# Patient Record
Sex: FEMALE | Race: WHITE | NOT HISPANIC OR LATINO | URBAN - METROPOLITAN AREA
[De-identification: names, ages, dates, MRNs, and addresses within clinical notes are randomized per-mention and may not be internally consistent; named-entity substitution may affect disease eponyms.]

---

## 2024-07-28 ENCOUNTER — OFFICE VISIT (OUTPATIENT)
Dept: URGENT CARE | Facility: CLINIC | Age: 84
End: 2024-07-28
Payer: MEDICARE

## 2024-07-28 VITALS
SYSTOLIC BLOOD PRESSURE: 117 MMHG | BODY MASS INDEX: 30.73 KG/M2 | DIASTOLIC BLOOD PRESSURE: 22 MMHG | HEIGHT: 64 IN | HEART RATE: 53 BPM | OXYGEN SATURATION: 98 % | RESPIRATION RATE: 19 BRPM | WEIGHT: 180 LBS | TEMPERATURE: 98 F

## 2024-07-28 DIAGNOSIS — J32.9 SINUSITIS, UNSPECIFIED CHRONICITY, UNSPECIFIED LOCATION: ICD-10-CM

## 2024-07-28 DIAGNOSIS — Z20.822 EXPOSURE TO COVID-19 VIRUS: ICD-10-CM

## 2024-07-28 DIAGNOSIS — U07.1 COVID-19 VIRUS INFECTION: Primary | ICD-10-CM

## 2024-07-28 LAB
CTP QC/QA: YES
SARS-COV-2 AG RESP QL IA.RAPID: POSITIVE

## 2024-07-28 PROCEDURE — 99203 OFFICE O/P NEW LOW 30 MIN: CPT | Mod: S$GLB,,, | Performed by: NURSE PRACTITIONER

## 2024-07-28 PROCEDURE — 87811 SARS-COV-2 COVID19 W/OPTIC: CPT | Mod: QW,S$GLB,, | Performed by: NURSE PRACTITIONER

## 2024-07-28 RX ORDER — METOPROLOL TARTRATE 50 MG/1
1 TABLET ORAL 2 TIMES DAILY
COMMUNITY
Start: 2024-03-27

## 2024-07-28 RX ORDER — PRAVASTATIN SODIUM 10 MG/1
1 TABLET ORAL NIGHTLY
COMMUNITY
Start: 2024-03-27

## 2024-07-28 RX ORDER — HYDROCHLOROTHIAZIDE 12.5 MG/1
12.5 CAPSULE ORAL
COMMUNITY
Start: 2024-03-27 | End: 2025-03-27

## 2024-07-28 RX ORDER — AMIODARONE HYDROCHLORIDE 200 MG/1
1 TABLET ORAL DAILY
COMMUNITY

## 2024-07-28 RX ORDER — HYDROXYZINE HYDROCHLORIDE 25 MG/1
TABLET, FILM COATED ORAL
COMMUNITY

## 2024-07-28 RX ORDER — LEVOTHYROXINE SODIUM 112 UG/1
1 TABLET ORAL DAILY
COMMUNITY
Start: 2024-03-27

## 2024-07-28 RX ORDER — BENZONATATE 100 MG/1
100 CAPSULE ORAL 3 TIMES DAILY PRN
Qty: 21 CAPSULE | Refills: 0 | Status: SHIPPED | OUTPATIENT
Start: 2024-07-28 | End: 2024-08-04

## 2024-07-28 RX ORDER — PRIMIDONE 50 MG/1
TABLET ORAL
COMMUNITY
Start: 2024-04-10

## 2024-07-28 RX ORDER — AZELASTINE 1 MG/ML
1 SPRAY, METERED NASAL 2 TIMES DAILY PRN
Qty: 30 ML | Refills: 0 | Status: SHIPPED | OUTPATIENT
Start: 2024-07-28 | End: 2025-07-28

## 2024-07-28 RX ORDER — IBANDRONATE SODIUM 150 MG/1
TABLET, FILM COATED ORAL
COMMUNITY
Start: 2024-03-27

## 2024-07-28 RX ORDER — TRIAMCINOLONE ACETONIDE 1 MG/G
CREAM TOPICAL
COMMUNITY
Start: 2024-07-01

## 2024-07-28 RX ORDER — EMPAGLIFLOZIN 10 MG/1
1 TABLET, FILM COATED ORAL DAILY
COMMUNITY
Start: 2024-07-19

## 2024-07-28 RX ORDER — APIXABAN 5 MG/1
5 TABLET, FILM COATED ORAL 2 TIMES DAILY
COMMUNITY
Start: 2024-04-02

## 2024-07-28 RX ORDER — FUROSEMIDE 20 MG/1
TABLET ORAL
COMMUNITY

## 2024-07-28 RX ORDER — PREGABALIN 100 MG/1
1 CAPSULE ORAL 2 TIMES DAILY
COMMUNITY
Start: 2024-03-27

## 2024-07-28 RX ORDER — CETIRIZINE HYDROCHLORIDE 10 MG/1
10 TABLET ORAL
COMMUNITY
Start: 2023-11-28

## 2024-07-28 RX ORDER — GUAIFENESIN 100 MG/5ML
400 SOLUTION ORAL EVERY 4 HOURS PRN
Qty: 180 ML | Refills: 0 | Status: SHIPPED | OUTPATIENT
Start: 2024-07-28 | End: 2024-08-04

## 2024-07-28 RX ORDER — ERGOCALCIFEROL 1.25 MG/1
CAPSULE ORAL
COMMUNITY
Start: 2024-03-27

## 2024-07-28 NOTE — PATIENT INSTRUCTIONS
Quarantine at home for 5 days from onset of symptoms, or 24 hours fever free without the use of antipyretic medication.    Tylenol/motrin otc for fever/pain.  You may alternate these every 4 hours as needed for close control of fever or pain  Stop all over the counter cold, sinus, flu medications  Increase clear fluid intake.  Goal of 3/4-1 gal of fluid daily  May use Astelin nasal spray  as needed for nasal congestion and sinus pressure  Start Robitussin as needed for cough.  Take each dose of Robitussin with a large glass of water   May take Tessalon Perles for excessive cough.  May use a cool mist humidifier at bedside as needed for respiratory comfort  May use warm saltwater gargles 4 x daily and benzocaine anesthetic throat lozenges for sore throat  Follow up with PCP after quarantine for evaluation  Go to ER for shortness of breath, chest pain, or other emergent concern  Return to clinic for new, worse symptoms.

## 2024-07-28 NOTE — LETTER
July 28, 2024      [facility] [place]  [address]  [address]  [place] [address]  Phone: [phone]  Fax: [phone]       Patient: Niurka Patel   YOB: 1940  Date of Visit: 07/28/2024    To Whom It May Concern:    Nick Patel  was at [facility] on 07/28/2024. The patient may return to work/school on August 1, 2024  with no restrictions. If you have any questions or concerns, or if I can be of further assistance, please do not hesitate to contact me.    Sincerely,    Maik Aranda Jr., ADAMP-C

## 2024-07-28 NOTE — PROGRESS NOTES
"Subjective:      Patient ID: Niurka Patel is a 84 y.o. female.    Vitals:  height is 5' 4" (1.626 m) and weight is 81.6 kg (180 lb). Her oral temperature is 97.5 °F (36.4 °C). Her blood pressure is 117/22 (abnormal) and her pulse is 53 (abnormal). Her respiration is 19 and oxygen saturation is 98%.     Chief Complaint: Sinusitis    84-year-old female seen today for exposure to COVID.  She states her neighbor whom she spends lot of time with developed upper respiratory symptoms and tested positive for COVID 2 days ago.  She states she has not developed any symptoms other than sinusitis and just not feeling well.  There has been no fever, cough, or headache.    Sinusitis  This is a new problem. The current episode started in the past 7 days (3 days). The problem has been gradually worsening since onset. There has been no fever. Associated symptoms include congestion. Pertinent negatives include no chills, coughing, shortness of breath or sore throat. (Runny nose. Watery eyes )       Constitution: Negative for chills and fever.   HENT:  Positive for congestion. Negative for sore throat.    Cardiovascular:  Negative for chest pain, palpitations and sob on exertion.   Respiratory:  Negative for cough and shortness of breath.    Gastrointestinal:  Negative for nausea and vomiting.   Musculoskeletal:  Negative for muscle ache.   Skin:  Negative for rash.   Neurological:  Negative for dizziness, light-headedness, passing out, disorientation and altered mental status.   Psychiatric/Behavioral:  Negative for altered mental status, disorientation and confusion.       Objective:     Physical Exam   Constitutional: She is oriented to person, place, and time. She appears well-developed. She is cooperative.  Non-toxic appearance. She does not appear ill. No distress.   HENT:   Head: Normocephalic and atraumatic.   Ears:   Right Ear: Hearing and external ear normal.   Left Ear: Hearing, tympanic membrane and external ear normal. "   Nose: Rhinorrhea and congestion present. No mucosal edema or nasal deformity. No epistaxis. Right sinus exhibits no maxillary sinus tenderness and no frontal sinus tenderness. Left sinus exhibits no maxillary sinus tenderness and no frontal sinus tenderness.   Mouth/Throat: Uvula is midline, oropharynx is clear and moist and mucous membranes are normal. Mucous membranes are moist. No trismus in the jaw. Normal dentition. No uvula swelling. No oropharyngeal exudate, posterior oropharyngeal edema, posterior oropharyngeal erythema, tonsillar abscesses or cobblestoning. Tonsils are 1+ on the right. Tonsils are 1+ on the left. No tonsillar exudate.   Eyes: Conjunctivae and lids are normal. No scleral icterus.   Neck: Trachea normal and phonation normal. Neck supple. No edema present. No erythema present. No neck rigidity present.   Cardiovascular: Normal rate, regular rhythm, normal heart sounds and normal pulses.   Pulmonary/Chest: Effort normal and breath sounds normal. No stridor. No respiratory distress. She has no decreased breath sounds. She has no rhonchi.   Abdominal: Normal appearance.   Musculoskeletal: Normal range of motion.         General: No deformity. Normal range of motion.   Neurological: no focal deficit. She is alert and oriented to person, place, and time. She exhibits normal muscle tone. Coordination normal.   Skin: Skin is warm, dry, intact, not diaphoretic and not pale. Capillary refill takes 2 to 3 seconds.   Psychiatric: Her speech is normal and behavior is normal. Judgment and thought content normal.   Nursing note and vitals reviewed.      Assessment:     1. COVID-19 virus infection    2. Exposure to COVID-19 virus    3. Sinusitis, unspecified chronicity, unspecified location        Plan:       COVID-19 virus infection  -     azelastine (ASTELIN) 137 mcg (0.1 %) nasal spray; 1 spray (137 mcg total) by Nasal route 2 (two) times daily as needed for Rhinitis.  Dispense: 30 mL; Refill: 0  -      guaiFENesin 100 mg/5 ml (ROBITUSSIN) 100 mg/5 mL syrup; Take 20 mLs (400 mg total) by mouth every 4 (four) hours as needed for Cough or Congestion.  Dispense: 180 mL; Refill: 0  -     benzocaine-menthoL 6-10 mg lozenge; Take 1 lozenge by mouth every 2 (two) hours as needed (Sore Throat).  Dispense: 18 tablet; Refill: 0  -     COVID-19 Home Symptom Monitoring  - Duration (days): 14  -     benzonatate (TESSALON) 100 MG capsule; Take 1 capsule (100 mg total) by mouth 3 (three) times daily as needed for Cough.  Dispense: 21 capsule; Refill: 0    Exposure to COVID-19 virus  -     SARS Coronavirus 2 Antigen, POCT Manual Read    Sinusitis, unspecified chronicity, unspecified location  -     SARS Coronavirus 2 Antigen, POCT Manual Read      The test results and physical exam findings were discussed with the patient and all questions answered. We discussed symptom monitoring, conservative care methods, medication use, and follow up orders. she verbalized understanding and agreement with the plan of care.

## 2024-08-04 ENCOUNTER — OFFICE VISIT (OUTPATIENT)
Dept: URGENT CARE | Facility: CLINIC | Age: 84
End: 2024-08-04
Payer: MEDICARE

## 2024-08-04 VITALS
TEMPERATURE: 98 F | OXYGEN SATURATION: 95 % | DIASTOLIC BLOOD PRESSURE: 70 MMHG | BODY MASS INDEX: 30.73 KG/M2 | RESPIRATION RATE: 17 BRPM | SYSTOLIC BLOOD PRESSURE: 108 MMHG | HEIGHT: 64 IN | WEIGHT: 180 LBS | HEART RATE: 63 BPM

## 2024-08-04 DIAGNOSIS — U07.1 COVID-19: ICD-10-CM

## 2024-08-04 DIAGNOSIS — R09.89 CHEST CONGESTION: Primary | ICD-10-CM

## 2024-08-04 LAB
CTP QC/QA: YES
SARS-COV-2 AG RESP QL IA.RAPID: POSITIVE

## 2024-08-04 PROCEDURE — 87811 SARS-COV-2 COVID19 W/OPTIC: CPT | Mod: QW,,, | Performed by: STUDENT IN AN ORGANIZED HEALTH CARE EDUCATION/TRAINING PROGRAM

## 2024-08-04 PROCEDURE — 99214 OFFICE O/P EST MOD 30 MIN: CPT | Mod: ,,, | Performed by: STUDENT IN AN ORGANIZED HEALTH CARE EDUCATION/TRAINING PROGRAM

## 2024-08-04 RX ORDER — LEVOCETIRIZINE DIHYDROCHLORIDE 5 MG/1
5 TABLET, FILM COATED ORAL NIGHTLY
Qty: 30 TABLET | Refills: 0 | Status: SHIPPED | OUTPATIENT
Start: 2024-08-04 | End: 2025-08-04

## 2024-08-04 RX ORDER — GUAIFENESIN 600 MG/1
1200 TABLET, EXTENDED RELEASE ORAL 2 TIMES DAILY
Qty: 30 TABLET | Refills: 0 | Status: SHIPPED | OUTPATIENT
Start: 2024-08-04

## 2024-12-27 ENCOUNTER — HOSPITAL ENCOUNTER (INPATIENT)
Facility: HOSPITAL | Age: 84
LOS: 8 days | Discharge: HOME HEALTH | DRG: 308 | End: 2025-01-05
Attending: EMERGENCY MEDICINE
Payer: MEDICARE

## 2024-12-27 DIAGNOSIS — I48.92 ATRIAL FIBRILLATION AND FLUTTER: ICD-10-CM

## 2024-12-27 DIAGNOSIS — I48.91 AFIB: ICD-10-CM

## 2024-12-27 DIAGNOSIS — I31.39 PERICARDIAL EFFUSION: ICD-10-CM

## 2024-12-27 DIAGNOSIS — R06.02 SHORTNESS OF BREATH: ICD-10-CM

## 2024-12-27 DIAGNOSIS — Z01.89 ENCOUNTER FOR CARDIOVERSION PROCEDURE: ICD-10-CM

## 2024-12-27 DIAGNOSIS — E87.1 HYPONATREMIA: ICD-10-CM

## 2024-12-27 DIAGNOSIS — R07.9 CHEST PAIN: ICD-10-CM

## 2024-12-27 DIAGNOSIS — I48.91 ATRIAL FIBRILLATION WITH RVR: Primary | ICD-10-CM

## 2024-12-27 DIAGNOSIS — J45.909 ASTHMA, UNSPECIFIED ASTHMA SEVERITY, UNSPECIFIED WHETHER COMPLICATED, UNSPECIFIED WHETHER PERSISTENT: ICD-10-CM

## 2024-12-27 DIAGNOSIS — I50.9 ACUTE ON CHRONIC HEART FAILURE: ICD-10-CM

## 2024-12-27 DIAGNOSIS — I48.91 ATRIAL FIBRILLATION AND FLUTTER: ICD-10-CM

## 2024-12-27 PROBLEM — I10 HYPERTENSION, ESSENTIAL, BENIGN: Status: ACTIVE | Noted: 2024-07-16

## 2024-12-27 PROBLEM — I63.9 CEREBROVASCULAR ACCIDENT (CVA): Status: ACTIVE | Noted: 2022-06-28

## 2024-12-27 PROBLEM — E78.5 HYPERLIPIDEMIA: Status: ACTIVE | Noted: 2022-07-15

## 2024-12-27 PROBLEM — I26.99 PULMONARY EMBOLISM: Status: ACTIVE | Noted: 2024-04-05

## 2024-12-27 PROBLEM — E03.9 HYPOTHYROIDISM: Status: ACTIVE | Noted: 2024-04-05

## 2024-12-27 PROBLEM — E66.9 OBESITY (BMI 30-39.9): Status: ACTIVE | Noted: 2017-12-19

## 2024-12-27 LAB
ALBUMIN SERPL BCP-MCNC: 3.5 G/DL (ref 3.5–5.2)
ALP SERPL-CCNC: 121 U/L (ref 55–135)
ALT SERPL W/O P-5'-P-CCNC: 24 U/L (ref 10–44)
ANION GAP SERPL CALC-SCNC: 5 MMOL/L (ref 8–16)
ANION GAP SERPL CALC-SCNC: 5 MMOL/L (ref 8–16)
AST SERPL-CCNC: 35 U/L (ref 10–40)
BASOPHILS # BLD AUTO: 0.07 K/UL (ref 0–0.2)
BASOPHILS NFR BLD: 0.7 % (ref 0–1.9)
BILIRUB SERPL-MCNC: 0.6 MG/DL (ref 0.1–1)
BNP SERPL-MCNC: 156 PG/ML (ref 0–99)
BUN SERPL-MCNC: 12 MG/DL (ref 8–23)
BUN SERPL-MCNC: 13 MG/DL (ref 8–23)
CALCIUM SERPL-MCNC: 8.5 MG/DL (ref 8.7–10.5)
CALCIUM SERPL-MCNC: 9.1 MG/DL (ref 8.7–10.5)
CHLORIDE SERPL-SCNC: 93 MMOL/L (ref 95–110)
CHLORIDE SERPL-SCNC: 96 MMOL/L (ref 95–110)
CO2 SERPL-SCNC: 26 MMOL/L (ref 23–29)
CO2 SERPL-SCNC: 27 MMOL/L (ref 23–29)
CREAT SERPL-MCNC: 0.6 MG/DL (ref 0.5–1.4)
CREAT SERPL-MCNC: 0.6 MG/DL (ref 0.5–1.4)
DIFFERENTIAL METHOD BLD: ABNORMAL
EOSINOPHIL # BLD AUTO: 0.2 K/UL (ref 0–0.5)
EOSINOPHIL NFR BLD: 2.1 % (ref 0–8)
ERYTHROCYTE [DISTWIDTH] IN BLOOD BY AUTOMATED COUNT: 15.5 % (ref 11.5–14.5)
EST. GFR  (NO RACE VARIABLE): >60 ML/MIN/1.73 M^2
EST. GFR  (NO RACE VARIABLE): >60 ML/MIN/1.73 M^2
GLUCOSE SERPL-MCNC: 107 MG/DL (ref 70–110)
GLUCOSE SERPL-MCNC: 121 MG/DL (ref 70–110)
HCT VFR BLD AUTO: 38.4 % (ref 37–48.5)
HGB BLD-MCNC: 12.7 G/DL (ref 12–16)
IMM GRANULOCYTES # BLD AUTO: 0.04 K/UL (ref 0–0.04)
IMM GRANULOCYTES NFR BLD AUTO: 0.4 % (ref 0–0.5)
INFLUENZA A, MOLECULAR: NEGATIVE
INFLUENZA B, MOLECULAR: NEGATIVE
INR PPP: 1.1 (ref 0.8–1.2)
LYMPHOCYTES # BLD AUTO: 1 K/UL (ref 1–4.8)
LYMPHOCYTES NFR BLD: 10.5 % (ref 18–48)
MAGNESIUM SERPL-MCNC: 2.1 MG/DL (ref 1.6–2.6)
MCH RBC QN AUTO: 28.8 PG (ref 27–31)
MCHC RBC AUTO-ENTMCNC: 33.1 G/DL (ref 32–36)
MCV RBC AUTO: 87 FL (ref 82–98)
MONOCYTES # BLD AUTO: 0.9 K/UL (ref 0.3–1)
MONOCYTES NFR BLD: 9.5 % (ref 4–15)
NEUTROPHILS # BLD AUTO: 7.6 K/UL (ref 1.8–7.7)
NEUTROPHILS NFR BLD: 76.8 % (ref 38–73)
NRBC BLD-RTO: 0 /100 WBC
OSMOLALITY SERPL: 266 MOSM/KG (ref 275–295)
OSMOLALITY UR: 247 MOSM/KG (ref 50–1200)
PLATELET # BLD AUTO: 342 K/UL (ref 150–450)
PMV BLD AUTO: 9.4 FL (ref 9.2–12.9)
POTASSIUM SERPL-SCNC: 4.1 MMOL/L (ref 3.5–5.1)
POTASSIUM SERPL-SCNC: 4.3 MMOL/L (ref 3.5–5.1)
PROT SERPL-MCNC: 6.7 G/DL (ref 6–8.4)
PROTHROMBIN TIME: 11.9 SEC (ref 9–12.5)
RBC # BLD AUTO: 4.41 M/UL (ref 4–5.4)
SARS-COV-2 RDRP RESP QL NAA+PROBE: NEGATIVE
SODIUM SERPL-SCNC: 124 MMOL/L (ref 136–145)
SODIUM SERPL-SCNC: 128 MMOL/L (ref 136–145)
SODIUM UR-SCNC: <10 MMOL/L (ref 20–250)
SPECIMEN SOURCE: NORMAL
T4 FREE SERPL-MCNC: 1.03 NG/DL (ref 0.71–1.51)
TROPONIN I SERPL HS-MCNC: 13.6 PG/ML (ref 0–14.9)
TSH SERPL DL<=0.005 MIU/L-ACNC: 10.38 UIU/ML (ref 0.34–5.6)
WBC # BLD AUTO: 9.9 K/UL (ref 3.9–12.7)

## 2024-12-27 PROCEDURE — G0378 HOSPITAL OBSERVATION PER HR: HCPCS

## 2024-12-27 PROCEDURE — 94799 UNLISTED PULMONARY SVC/PX: CPT

## 2024-12-27 PROCEDURE — 93010 ELECTROCARDIOGRAM REPORT: CPT | Mod: ,,, | Performed by: INTERNAL MEDICINE

## 2024-12-27 PROCEDURE — 84300 ASSAY OF URINE SODIUM: CPT

## 2024-12-27 PROCEDURE — 25000242 PHARM REV CODE 250 ALT 637 W/ HCPCS

## 2024-12-27 PROCEDURE — 83880 ASSAY OF NATRIURETIC PEPTIDE: CPT | Performed by: EMERGENCY MEDICINE

## 2024-12-27 PROCEDURE — 96365 THER/PROPH/DIAG IV INF INIT: CPT

## 2024-12-27 PROCEDURE — 27000221 HC OXYGEN, UP TO 24 HOURS

## 2024-12-27 PROCEDURE — 83935 ASSAY OF URINE OSMOLALITY: CPT

## 2024-12-27 PROCEDURE — 99900031 HC PATIENT EDUCATION (STAT)

## 2024-12-27 PROCEDURE — 93005 ELECTROCARDIOGRAM TRACING: CPT | Performed by: INTERNAL MEDICINE

## 2024-12-27 PROCEDURE — 83735 ASSAY OF MAGNESIUM: CPT | Performed by: EMERGENCY MEDICINE

## 2024-12-27 PROCEDURE — 25000003 PHARM REV CODE 250

## 2024-12-27 PROCEDURE — 84443 ASSAY THYROID STIM HORMONE: CPT | Performed by: EMERGENCY MEDICINE

## 2024-12-27 PROCEDURE — 80048 BASIC METABOLIC PNL TOTAL CA: CPT

## 2024-12-27 PROCEDURE — 94761 N-INVAS EAR/PLS OXIMETRY MLT: CPT

## 2024-12-27 PROCEDURE — 99291 CRITICAL CARE FIRST HOUR: CPT

## 2024-12-27 PROCEDURE — 84484 ASSAY OF TROPONIN QUANT: CPT | Performed by: EMERGENCY MEDICINE

## 2024-12-27 PROCEDURE — 99900035 HC TECH TIME PER 15 MIN (STAT)

## 2024-12-27 PROCEDURE — 85610 PROTHROMBIN TIME: CPT | Performed by: EMERGENCY MEDICINE

## 2024-12-27 PROCEDURE — 25000003 PHARM REV CODE 250: Performed by: EMERGENCY MEDICINE

## 2024-12-27 PROCEDURE — 84439 ASSAY OF FREE THYROXINE: CPT | Performed by: EMERGENCY MEDICINE

## 2024-12-27 PROCEDURE — 87502 INFLUENZA DNA AMP PROBE: CPT

## 2024-12-27 PROCEDURE — 85025 COMPLETE CBC W/AUTO DIFF WBC: CPT | Performed by: EMERGENCY MEDICINE

## 2024-12-27 PROCEDURE — 94640 AIRWAY INHALATION TREATMENT: CPT

## 2024-12-27 PROCEDURE — 87635 SARS-COV-2 COVID-19 AMP PRB: CPT

## 2024-12-27 PROCEDURE — 80053 COMPREHEN METABOLIC PANEL: CPT | Performed by: EMERGENCY MEDICINE

## 2024-12-27 PROCEDURE — 83930 ASSAY OF BLOOD OSMOLALITY: CPT

## 2024-12-27 RX ORDER — SODIUM CHLORIDE 9 MG/ML
INJECTION, SOLUTION INTRAVENOUS CONTINUOUS
Status: DISCONTINUED | OUTPATIENT
Start: 2024-12-27 | End: 2024-12-27

## 2024-12-27 RX ORDER — LANOLIN ALCOHOL/MO/W.PET/CERES
800 CREAM (GRAM) TOPICAL
Status: DISCONTINUED | OUTPATIENT
Start: 2024-12-27 | End: 2025-01-05 | Stop reason: HOSPADM

## 2024-12-27 RX ORDER — LEVOTHYROXINE SODIUM 112 UG/1
112 TABLET ORAL
Status: DISCONTINUED | OUTPATIENT
Start: 2024-12-28 | End: 2025-01-05 | Stop reason: HOSPADM

## 2024-12-27 RX ORDER — AMOXICILLIN 250 MG
1 CAPSULE ORAL 2 TIMES DAILY PRN
Status: DISCONTINUED | OUTPATIENT
Start: 2024-12-27 | End: 2025-01-05 | Stop reason: HOSPADM

## 2024-12-27 RX ORDER — SODIUM,POTASSIUM PHOSPHATES 280-250MG
2 POWDER IN PACKET (EA) ORAL
Status: DISCONTINUED | OUTPATIENT
Start: 2024-12-27 | End: 2025-01-05 | Stop reason: HOSPADM

## 2024-12-27 RX ORDER — GLUCAGON 1 MG
1 KIT INJECTION
Status: DISCONTINUED | OUTPATIENT
Start: 2024-12-27 | End: 2025-01-05 | Stop reason: HOSPADM

## 2024-12-27 RX ORDER — IPRATROPIUM BROMIDE AND ALBUTEROL SULFATE 2.5; .5 MG/3ML; MG/3ML
3 SOLUTION RESPIRATORY (INHALATION) EVERY 6 HOURS PRN
Status: DISCONTINUED | OUTPATIENT
Start: 2024-12-27 | End: 2024-12-27

## 2024-12-27 RX ORDER — IPRATROPIUM BROMIDE AND ALBUTEROL SULFATE 2.5; .5 MG/3ML; MG/3ML
3 SOLUTION RESPIRATORY (INHALATION) EVERY 4 HOURS PRN
Status: DISCONTINUED | OUTPATIENT
Start: 2024-12-28 | End: 2025-01-05 | Stop reason: HOSPADM

## 2024-12-27 RX ORDER — NALOXONE HCL 0.4 MG/ML
0.02 VIAL (ML) INJECTION
Status: DISCONTINUED | OUTPATIENT
Start: 2024-12-27 | End: 2025-01-05 | Stop reason: HOSPADM

## 2024-12-27 RX ORDER — IBUPROFEN 200 MG
16 TABLET ORAL
Status: DISCONTINUED | OUTPATIENT
Start: 2024-12-27 | End: 2025-01-05 | Stop reason: HOSPADM

## 2024-12-27 RX ORDER — TALC
6 POWDER (GRAM) TOPICAL NIGHTLY PRN
Status: DISCONTINUED | OUTPATIENT
Start: 2024-12-27 | End: 2025-01-05 | Stop reason: HOSPADM

## 2024-12-27 RX ORDER — ALUMINUM HYDROXIDE, MAGNESIUM HYDROXIDE, AND SIMETHICONE 1200; 120; 1200 MG/30ML; MG/30ML; MG/30ML
30 SUSPENSION ORAL 4 TIMES DAILY PRN
Status: DISCONTINUED | OUTPATIENT
Start: 2024-12-27 | End: 2025-01-05 | Stop reason: HOSPADM

## 2024-12-27 RX ORDER — IBUPROFEN 200 MG
24 TABLET ORAL
Status: DISCONTINUED | OUTPATIENT
Start: 2024-12-27 | End: 2025-01-05 | Stop reason: HOSPADM

## 2024-12-27 RX ORDER — ASPIRIN 325 MG
325 TABLET ORAL
Status: COMPLETED | OUTPATIENT
Start: 2024-12-27 | End: 2024-12-27

## 2024-12-27 RX ORDER — PRAVASTATIN SODIUM 10 MG/1
10 TABLET ORAL NIGHTLY
Status: DISCONTINUED | OUTPATIENT
Start: 2024-12-27 | End: 2025-01-05 | Stop reason: HOSPADM

## 2024-12-27 RX ORDER — SODIUM CHLORIDE 0.9 % (FLUSH) 0.9 %
10 SYRINGE (ML) INJECTION EVERY 12 HOURS PRN
Status: DISCONTINUED | OUTPATIENT
Start: 2024-12-27 | End: 2025-01-05 | Stop reason: HOSPADM

## 2024-12-27 RX ORDER — ACETAMINOPHEN 325 MG/1
650 TABLET ORAL EVERY 4 HOURS PRN
Status: DISCONTINUED | OUTPATIENT
Start: 2024-12-27 | End: 2025-01-05 | Stop reason: HOSPADM

## 2024-12-27 RX ORDER — ONDANSETRON HYDROCHLORIDE 2 MG/ML
4 INJECTION, SOLUTION INTRAVENOUS EVERY 6 HOURS PRN
Status: DISCONTINUED | OUTPATIENT
Start: 2024-12-27 | End: 2025-01-05 | Stop reason: HOSPADM

## 2024-12-27 RX ADMIN — Medication 6 MG: at 10:12

## 2024-12-27 RX ADMIN — DEXTROSE MONOHYDRATE 12.5 MG/HR: 50 INJECTION, SOLUTION INTRAVENOUS at 11:12

## 2024-12-27 RX ADMIN — IPRATROPIUM BROMIDE AND ALBUTEROL SULFATE 3 ML: .5; 3 SOLUTION RESPIRATORY (INHALATION) at 08:12

## 2024-12-27 RX ADMIN — PRAVASTATIN SODIUM 10 MG: 10 TABLET ORAL at 10:12

## 2024-12-27 RX ADMIN — APIXABAN 5 MG: 5 TABLET, FILM COATED ORAL at 10:12

## 2024-12-27 RX ADMIN — ASPIRIN 325 MG ORAL TABLET 325 MG: 325 PILL ORAL at 04:12

## 2024-12-27 RX ADMIN — DEXTROSE MONOHYDRATE 5 MG/HR: 50 INJECTION, SOLUTION INTRAVENOUS at 04:12

## 2024-12-27 RX ADMIN — PREGABALIN 100 MG: 75 CAPSULE ORAL at 10:12

## 2024-12-27 NOTE — ED PROVIDER NOTES
Encounter Date: 12/27/2024       History     Chief Complaint   Patient presents with    Palpitations     Pt states she was in a fib 1 month ago and thinks she is back in a-fib again today     84-year-old female presented emergency department with generalized weakness and fatigue and shortness of breath over the past 2 days.  Patient has history of atrial fibrillation.  Home health nurse checked her heart rate and it was fast and she was in atrial fibrillation.  Patient had a cardioversion about a month ago.  Patient on blood thinners.  Patient states she takes Eliquis.  Denies any dysuria or hematuria.  Denies any focal weakness or numbness      Review of patient's allergies indicates:  No Known Allergies  No past medical history on file.  No past surgical history on file.  No family history on file.     Review of Systems   Constitutional: Negative.    HENT: Negative.     Eyes: Negative.    Respiratory:  Positive for shortness of breath.    Cardiovascular:  Positive for palpitations. Negative for chest pain.   Gastrointestinal: Negative.    Endocrine: Negative.    Genitourinary: Negative.    Musculoskeletal: Negative.    Skin: Negative.    Allergic/Immunologic: Negative.    Neurological: Negative.    Hematological: Negative.    Psychiatric/Behavioral: Negative.     All other systems reviewed and are negative.      Physical Exam     Initial Vitals [12/27/24 1528]   BP Pulse Resp Temp SpO2   103/74 (!) 122 18 98.1 °F (36.7 °C) 97 %      MAP       --         Physical Exam    Nursing note and vitals reviewed.  Constitutional: She appears well-developed and well-nourished.   HENT:   Head: Normocephalic and atraumatic.   Nose: Nose normal. Mouth/Throat: Oropharynx is clear and moist.   Eyes: Conjunctivae and EOM are normal. Pupils are equal, round, and reactive to light.   Neck: Neck supple. No thyromegaly present. No tracheal deviation present. No JVD present.   Normal range of motion.  Cardiovascular:  Normal heart  sounds and intact distal pulses.           No murmur heard.  Tachycardia with irregularly irregular rhythm   Pulmonary/Chest: Breath sounds normal. No stridor. No respiratory distress. She has no wheezes. She has no rales.   Abdominal: Abdomen is soft. Bowel sounds are normal. She exhibits no distension. There is no abdominal tenderness.   Musculoskeletal:         General: No tenderness or edema. Normal range of motion.      Cervical back: Normal range of motion and neck supple.     Neurological: She is alert and oriented to person, place, and time. She has normal strength. GCS score is 15. GCS eye subscore is 4. GCS verbal subscore is 5. GCS motor subscore is 6.   Skin: Skin is warm. Capillary refill takes less than 2 seconds.   Psychiatric: She has a normal mood and affect. Thought content normal.         ED Course   Critical Care    Date/Time: 12/27/2024 3:43 PM    Performed by: Tony Mooney MD  Authorized by: Tony Mooney MD  Direct patient critical care time: 20 minutes  Ordering / reviewing critical care time: 5 minutes  Documentation critical care time: 5 minutes  Consulting other physicians critical care time: 5 minutes  Total critical care time (exclusive of procedural time) : 35 minutes        Labs Reviewed   CBC W/ AUTO DIFFERENTIAL - Abnormal       Result Value    WBC 9.90      RBC 4.41      Hemoglobin 12.7      Hematocrit 38.4      MCV 87      MCH 28.8      MCHC 33.1      RDW 15.5 (*)     Platelets 342      MPV 9.4      Immature Granulocytes 0.4      Gran # (ANC) 7.6      Immature Grans (Abs) 0.04      Lymph # 1.0      Mono # 0.9      Eos # 0.2      Baso # 0.07      nRBC 0      Gran % 76.8 (*)     Lymph % 10.5 (*)     Mono % 9.5      Eosinophil % 2.1      Basophil % 0.7      Differential Method Automated     COMPREHENSIVE METABOLIC PANEL - Abnormal    Sodium 124 (*)     Potassium 4.3      Chloride 93 (*)     CO2 26      Glucose 107      BUN 13      Creatinine 0.6      Calcium 9.1      Total Protein 6.7       Albumin 3.5      Total Bilirubin 0.6      Alkaline Phosphatase 121      AST 35      ALT 24      eGFR >60.0      Anion Gap 5 (*)    B-TYPE NATRIURETIC PEPTIDE - Abnormal     (*)    TROPONIN I HIGH SENSITIVITY    Troponin I High Sensitivity 13.6     PROTIME-INR    Prothrombin Time 11.9      INR 1.1     MAGNESIUM    Magnesium 2.1       EKG Readings: (Independently Interpreted)   Initial Reading: No STEMI. Rhythm: Atrial Fibrillation. Ectopy: No Ectopy. Conduction: Normal.   Atrial fibrillation with rapid ventricular response     ECG Results              EKG 12-lead (In process)        Collection Time Result Time QRS Duration [facility] QTC Calculation    12/27/24 15:29:05 12/27/24 16:08:29 104 393                     In process by Interface, Lab In [facility] (12/27/24 16:08:38)                   Narrative:    Test Reason : R06.02,    Vent. Rate : 138 BPM     Atrial Rate : 144 BPM     P-R Int :    ms          QRS Dur : 104 ms      QT Int : 260 ms       P-R-T Axes :    154  41 degrees    QTcB Int : 393 ms    Atrial fibrillation with rapid ventricular response  Right axis deviation  Incomplete right bundle branch block  Possible Right ventricular hypertrophy  Septal infarct ,age undetermined  Abnormal ECG  No previous ECGs available    Referred By: AAAREFERRAL SELF           Confirmed By:                                   Imaging Results              X-Ray Chest AP Portable (Final result)  Result time 12/27/24 16:22:14      Final result by Yaquelin Jacobs IV, MD (12/27/24 16:22:14)                   Impression:      Suboptimal positioning.    Cardiomegaly.    Scattered linear and ground-glass opacities in the mid lower lung zones.    Blunting of the costophrenic angles compatible small components of pleural fluid.    Questionable nodular opacity in the right upper lobe near the apex.  Follow-up plain radiographs are recommended to determine at this represents a persistent finding.  If such, CT would be necessary for  further investigation.      Electronically signed by: Yaquelin Jacobs  Date:    12/27/2024  Time:    16:22               Narrative:    EXAMINATION:  XR CHEST AP PORTABLE    CLINICAL HISTORY:  CHF;    COMPARISON:  None.    FINDINGS:  The patient is significantly rotated.    The heart is enlarged.  The central pulmonary vasculature does not appear acutely engorged.  There is tortuosity of the aorta and branches.    Linear parenchymal opacities are observed within the left mid and lower lung zone and in the right lung base.  Additionally, there are mild ground-glass opacities in the right mid lung zone and bilateral lower lung zones.  Blunting of the costophrenic angles bilaterally suggest small components of pleural fluid.    There is a nodular appearing opacity within the left pulmonary apex.  Follow-up radiographs are recommended to determine if this is a persistent abnormality.                                       Medications   diltiaZEM 100 mg in dextrose 5% 100 mL IVPB (ready to mix) (titrating) (5 mg/hr Intravenous New Bag 12/27/24 1657)   0.9% NaCl infusion (has no administration in time range)   aspirin tablet 325 mg (325 mg Oral Given 12/27/24 1657)     Medical Decision Making  84-year-old female presented emergency department with palpitations.  Patient has history of atrial fibrillation.  Patient now in atrial fibrillation with rapid ventricular response.  Responded to Cardizem.  Patient otherwise hemodynamically stable.  Patient has slight shortness of breath with exertion.  Screening labs and workup reviewed.  Patient on blood thinners.  Patient noted to be slightly hyponatremic.  Will closely monitor in the hospital and heart rate control with Cardizem.  Hospital Medicine consulted for evaluation for further management and treatment    Amount and/or Complexity of Data Reviewed  Labs: ordered. Decision-making details documented in ED Course.  Radiology: ordered. Decision-making details documented in ED  Course.  ECG/medicine tests: ordered and independent interpretation performed. Decision-making details documented in ED Course.    Risk  OTC drugs.  Prescription drug management.  Decision regarding hospitalization.                                      Clinical Impression:  Final diagnoses:  [R06.02] Shortness of breath  [I48.91] Atrial fibrillation with RVR (Primary)  [E87.1] Hyponatremia          ED Disposition Condition    Admit Critical                Tony Mooney MD  12/27/24 1700       Tony Mooney MD  12/27/24 1700

## 2024-12-28 ENCOUNTER — CLINICAL SUPPORT (OUTPATIENT)
Dept: CARDIOLOGY | Facility: HOSPITAL | Age: 84
End: 2024-12-28
Attending: EMERGENCY MEDICINE
Payer: MEDICARE

## 2024-12-28 LAB
ANION GAP SERPL CALC-SCNC: 10 MMOL/L (ref 8–16)
ANION GAP SERPL CALC-SCNC: 6 MMOL/L (ref 8–16)
AORTIC ROOT ANNULUS: 3.3 CM
AORTIC VALVE CUSP SEPERATION: 2.3 CM
APICAL FOUR CHAMBER EJECTION FRACTION: 47 %
APICAL TWO CHAMBER EJECTION FRACTION: 25 %
APTT PPP: 41.6 SEC (ref 21–32)
ASCENDING AORTA: 3.4 CM
AV INDEX (PROSTH): 0.94
AV MEAN GRADIENT: 3 MMHG
AV PEAK GRADIENT: 4.8 MMHG
AV VALVE AREA BY VELOCITY RATIO: 2.8 CM²
AV VALVE AREA: 3.3 CM²
AV VELOCITY RATIO: 0.82
BACTERIA #/AREA URNS HPF: ABNORMAL /HPF
BASOPHILS # BLD AUTO: 0.04 K/UL (ref 0–0.2)
BASOPHILS # BLD AUTO: 0.06 K/UL (ref 0–0.2)
BASOPHILS NFR BLD: 0.5 % (ref 0–1.9)
BASOPHILS NFR BLD: 0.7 % (ref 0–1.9)
BILIRUB UR QL STRIP: NEGATIVE
BSA FOR ECHO PROCEDURE: 1.82 M2
BUN SERPL-MCNC: 10 MG/DL (ref 8–23)
BUN SERPL-MCNC: 12 MG/DL (ref 8–23)
CALCIUM SERPL-MCNC: 8.7 MG/DL (ref 8.7–10.5)
CALCIUM SERPL-MCNC: 8.7 MG/DL (ref 8.7–10.5)
CHLORIDE SERPL-SCNC: 97 MMOL/L (ref 95–110)
CHLORIDE SERPL-SCNC: 98 MMOL/L (ref 95–110)
CHOLEST SERPL-MCNC: 100 MG/DL (ref 120–199)
CHOLEST/HDLC SERPL: 2 {RATIO} (ref 2–5)
CLARITY UR: CLEAR
CO2 SERPL-SCNC: 22 MMOL/L (ref 23–29)
CO2 SERPL-SCNC: 25 MMOL/L (ref 23–29)
COLOR UR: COLORLESS
CORTIS SERPL-MCNC: 21.4 UG/DL (ref 4.3–22.4)
CREAT SERPL-MCNC: 0.5 MG/DL (ref 0.5–1.4)
CREAT SERPL-MCNC: 0.5 MG/DL (ref 0.5–1.4)
CV ECHO LV RWT: 0.53 CM
DIFFERENTIAL METHOD BLD: ABNORMAL
DIFFERENTIAL METHOD BLD: ABNORMAL
DOP CALC AO PEAK VEL: 1.1 M/S
DOP CALC AO VTI: 16.6 CM
DOP CALC LVOT AREA: 3.5 CM2
DOP CALC LVOT DIAMETER: 2.1 CM
DOP CALC LVOT PEAK VEL: 0.9 M/S
DOP CALC LVOT STROKE VOLUME: 54 CM3
DOP CALC MV VTI: 15.1 CM
DOP CALCLVOT PEAK VEL VTI: 15.6 CM
E/E' RATIO: 9.68 M/S
ECHO LV POSTERIOR WALL: 1.2 CM (ref 0.6–1.1)
EOSINOPHIL # BLD AUTO: 0.3 K/UL (ref 0–0.5)
EOSINOPHIL # BLD AUTO: 0.3 K/UL (ref 0–0.5)
EOSINOPHIL NFR BLD: 2.9 % (ref 0–8)
EOSINOPHIL NFR BLD: 3.5 % (ref 0–8)
ERYTHROCYTE [DISTWIDTH] IN BLOOD BY AUTOMATED COUNT: 15.5 % (ref 11.5–14.5)
ERYTHROCYTE [DISTWIDTH] IN BLOOD BY AUTOMATED COUNT: 15.6 % (ref 11.5–14.5)
EST. GFR  (NO RACE VARIABLE): >60 ML/MIN/1.73 M^2
EST. GFR  (NO RACE VARIABLE): >60 ML/MIN/1.73 M^2
ESTIMATED AVG GLUCOSE: 128 MG/DL (ref 68–131)
FRACTIONAL SHORTENING: 15.6 % (ref 28–44)
GLUCOSE SERPL-MCNC: 107 MG/DL (ref 70–110)
GLUCOSE SERPL-MCNC: 94 MG/DL (ref 70–110)
GLUCOSE UR QL STRIP: ABNORMAL
HBA1C MFR BLD: 6.1 % (ref 4.5–6.2)
HCT VFR BLD AUTO: 36.4 % (ref 37–48.5)
HCT VFR BLD AUTO: 38.7 % (ref 37–48.5)
HDLC SERPL-MCNC: 51 MG/DL (ref 40–75)
HDLC SERPL: 51 % (ref 20–50)
HGB BLD-MCNC: 12.3 G/DL (ref 12–16)
HGB BLD-MCNC: 12.6 G/DL (ref 12–16)
HGB UR QL STRIP: NEGATIVE
IMM GRANULOCYTES # BLD AUTO: 0.03 K/UL (ref 0–0.04)
IMM GRANULOCYTES # BLD AUTO: 0.04 K/UL (ref 0–0.04)
IMM GRANULOCYTES NFR BLD AUTO: 0.3 % (ref 0–0.5)
IMM GRANULOCYTES NFR BLD AUTO: 0.5 % (ref 0–0.5)
INR PPP: 1.1 (ref 0.8–1.2)
INTERVENTRICULAR SEPTUM: 0.9 CM (ref 0.6–1.1)
IVC DIAMETER: 2.4 CM
KETONES UR QL STRIP: NEGATIVE
LDLC SERPL CALC-MCNC: 38 MG/DL (ref 63–159)
LEFT ATRIUM AREA SYSTOLIC (APICAL 2 CHAMBER): 26.3 CM2
LEFT ATRIUM AREA SYSTOLIC (APICAL 4 CHAMBER): 27.1 CM2
LEFT ATRIUM VOLUME INDEX MOD: 46.1 ML/M2
LEFT ATRIUM VOLUME MOD: 82.5 ML
LEFT INTERNAL DIMENSION IN SYSTOLE: 3.8 CM (ref 2.1–4)
LEFT VENTRICLE DIASTOLIC VOLUME INDEX: 51.62 ML/M2
LEFT VENTRICLE DIASTOLIC VOLUME: 92.4 ML
LEFT VENTRICLE END DIASTOLIC VOLUME APICAL 2 CHAMBER: 69.9 ML
LEFT VENTRICLE END DIASTOLIC VOLUME APICAL 4 CHAMBER: 62.9 ML
LEFT VENTRICLE END SYSTOLIC VOLUME APICAL 2 CHAMBER: 81.9 ML
LEFT VENTRICLE END SYSTOLIC VOLUME APICAL 4 CHAMBER: 83 ML
LEFT VENTRICLE MASS INDEX: 91.6 G/M2
LEFT VENTRICLE SYSTOLIC VOLUME INDEX: 34.6 ML/M2
LEFT VENTRICLE SYSTOLIC VOLUME: 62 ML
LEFT VENTRICULAR INTERNAL DIMENSION IN DIASTOLE: 4.5 CM (ref 3.5–6)
LEFT VENTRICULAR MASS: 164 G
LEUKOCYTE ESTERASE UR QL STRIP: ABNORMAL
LV LATERAL E/E' RATIO: 9.2 M/S
LV SEPTAL E/E' RATIO: 10.22 M/S
LVED V (TEICH): 92.4 ML
LVES V (TEICH): 62 ML
LVOT MG: 2 MMHG
LVOT MV: 0.58 CM/S
LYMPHOCYTES # BLD AUTO: 0.9 K/UL (ref 1–4.8)
LYMPHOCYTES # BLD AUTO: 1.1 K/UL (ref 1–4.8)
LYMPHOCYTES NFR BLD: 10.8 % (ref 18–48)
LYMPHOCYTES NFR BLD: 12.8 % (ref 18–48)
MAGNESIUM SERPL-MCNC: 2 MG/DL (ref 1.6–2.6)
MAGNESIUM SERPL-MCNC: 2.1 MG/DL (ref 1.6–2.6)
MCH RBC QN AUTO: 28.5 PG (ref 27–31)
MCH RBC QN AUTO: 29.4 PG (ref 27–31)
MCHC RBC AUTO-ENTMCNC: 32.6 G/DL (ref 32–36)
MCHC RBC AUTO-ENTMCNC: 33.8 G/DL (ref 32–36)
MCV RBC AUTO: 87 FL (ref 82–98)
MCV RBC AUTO: 88 FL (ref 82–98)
MICROSCOPIC COMMENT: ABNORMAL
MONOCYTES # BLD AUTO: 0.9 K/UL (ref 0.3–1)
MONOCYTES # BLD AUTO: 0.9 K/UL (ref 0.3–1)
MONOCYTES NFR BLD: 10 % (ref 4–15)
MONOCYTES NFR BLD: 10.9 % (ref 4–15)
MR PISA EROA: 0.14 CM2
MV MEAN GRADIENT: 1 MMHG
MV PEAK E VEL: 0.92 M/S
MV PEAK GRADIENT: 3 MMHG
MV STENOSIS PRESSURE HALF TIME: 57 MS
MV VALVE AREA BY CONTINUITY EQUATION: 3.58 CM2
MV VALVE AREA P 1/2 METHOD: 3.86 CM2
NEUTROPHILS # BLD AUTO: 6.4 K/UL (ref 1.8–7.7)
NEUTROPHILS # BLD AUTO: 6.5 K/UL (ref 1.8–7.7)
NEUTROPHILS NFR BLD: 73.3 % (ref 38–73)
NEUTROPHILS NFR BLD: 73.8 % (ref 38–73)
NITRITE UR QL STRIP: NEGATIVE
NONHDLC SERPL-MCNC: 49 MG/DL
NRBC BLD-RTO: 0 /100 WBC
NRBC BLD-RTO: 0 /100 WBC
OHS LV EJECTION FRACTION SIMPSONS BIPLANE MOD: 38 %
OSMOLALITY UR: 223 MOSM/KG (ref 50–1200)
PH UR STRIP: 6 [PH] (ref 5–8)
PHOSPHATE SERPL-MCNC: 3.1 MG/DL (ref 2.7–4.5)
PISA MRMAX VEL: 4.81 M/S
PISA RADIUS: 0.6 CM
PISA TR MAX VEL: 2.25 M/S
PISA VN NYQUIST MS: 0.3 M/S
PISA VN NYQUIST: 0.3 M/S
PLATELET # BLD AUTO: 316 K/UL (ref 150–450)
PLATELET # BLD AUTO: 346 K/UL (ref 150–450)
PMV BLD AUTO: 9.1 FL (ref 9.2–12.9)
PMV BLD AUTO: 9.7 FL (ref 9.2–12.9)
POTASSIUM SERPL-SCNC: 3.9 MMOL/L (ref 3.5–5.1)
POTASSIUM SERPL-SCNC: 4.2 MMOL/L (ref 3.5–5.1)
PROT UR QL STRIP: NEGATIVE
PROTHROMBIN TIME: 12.2 SEC (ref 9–12.5)
PV MV: 0.47 M/S
PV PEAK GRADIENT: 2 MMHG
PV PEAK VELOCITY: 0.71 M/S
RBC # BLD AUTO: 4.19 M/UL (ref 4–5.4)
RBC # BLD AUTO: 4.42 M/UL (ref 4–5.4)
RBC #/AREA URNS HPF: 1 /HPF (ref 0–4)
RIGHT ATRIUM VOLUME AREA LENGTH APICAL 4 CHAMBER: 79.4 ML
RV TISSUE DOPPLER FREE WALL SYSTOLIC VELOCITY 1 (APICAL 4 CHAMBER VIEW): 6.31 CM/S
SINUS: 3.2 CM
SODIUM SERPL-SCNC: 128 MMOL/L (ref 136–145)
SODIUM SERPL-SCNC: 130 MMOL/L (ref 136–145)
SODIUM UR-SCNC: 53 MMOL/L (ref 20–250)
SP GR UR STRIP: <1.005 (ref 1–1.03)
SQUAMOUS #/AREA URNS HPF: 1 /HPF
STJ: 2.8 CM
TDI LATERAL: 0.1 M/S
TDI SEPTAL: 0.09 M/S
TDI: 0.1 M/S
TR MAX PG: 20 MMHG
TRICUSPID ANNULAR PLANE SYSTOLIC EXCURSION: 1.37 CM
TRIGL SERPL-MCNC: 55 MG/DL (ref 30–150)
URN SPEC COLLECT METH UR: ABNORMAL
UROBILINOGEN UR STRIP-ACNC: NEGATIVE EU/DL
WBC # BLD AUTO: 8.65 K/UL (ref 3.9–12.7)
WBC # BLD AUTO: 8.82 K/UL (ref 3.9–12.7)
WBC #/AREA URNS HPF: 2 /HPF (ref 0–5)
YEAST URNS QL MICRO: ABNORMAL
Z-SCORE OF LEFT VENTRICULAR DIMENSION IN END DIASTOLE: -0.95
Z-SCORE OF LEFT VENTRICULAR DIMENSION IN END SYSTOLE: 1.72

## 2024-12-28 PROCEDURE — 85610 PROTHROMBIN TIME: CPT

## 2024-12-28 PROCEDURE — 99223 1ST HOSP IP/OBS HIGH 75: CPT | Mod: ,,, | Performed by: INTERNAL MEDICINE

## 2024-12-28 PROCEDURE — 27000221 HC OXYGEN, UP TO 24 HOURS

## 2024-12-28 PROCEDURE — 81001 URINALYSIS AUTO W/SCOPE: CPT | Performed by: NURSE PRACTITIONER

## 2024-12-28 PROCEDURE — 93005 ELECTROCARDIOGRAM TRACING: CPT | Performed by: INTERNAL MEDICINE

## 2024-12-28 PROCEDURE — 25000003 PHARM REV CODE 250: Performed by: EMERGENCY MEDICINE

## 2024-12-28 PROCEDURE — 94640 AIRWAY INHALATION TREATMENT: CPT

## 2024-12-28 PROCEDURE — 83735 ASSAY OF MAGNESIUM: CPT

## 2024-12-28 PROCEDURE — 80048 BASIC METABOLIC PNL TOTAL CA: CPT | Mod: 91 | Performed by: NURSE PRACTITIONER

## 2024-12-28 PROCEDURE — 82533 TOTAL CORTISOL: CPT

## 2024-12-28 PROCEDURE — 93306 TTE W/DOPPLER COMPLETE: CPT

## 2024-12-28 PROCEDURE — 94640 AIRWAY INHALATION TREATMENT: CPT | Mod: XB

## 2024-12-28 PROCEDURE — 36415 COLL VENOUS BLD VENIPUNCTURE: CPT | Performed by: NURSE PRACTITIONER

## 2024-12-28 PROCEDURE — 83735 ASSAY OF MAGNESIUM: CPT | Mod: 91 | Performed by: NURSE PRACTITIONER

## 2024-12-28 PROCEDURE — 84100 ASSAY OF PHOSPHORUS: CPT

## 2024-12-28 PROCEDURE — 97530 THERAPEUTIC ACTIVITIES: CPT

## 2024-12-28 PROCEDURE — 83036 HEMOGLOBIN GLYCOSYLATED A1C: CPT

## 2024-12-28 PROCEDURE — 25000003 PHARM REV CODE 250

## 2024-12-28 PROCEDURE — 99900031 HC PATIENT EDUCATION (STAT)

## 2024-12-28 PROCEDURE — 93306 TTE W/DOPPLER COMPLETE: CPT | Mod: 26,,, | Performed by: INTERNAL MEDICINE

## 2024-12-28 PROCEDURE — 93010 ELECTROCARDIOGRAM REPORT: CPT | Mod: ,,, | Performed by: INTERNAL MEDICINE

## 2024-12-28 PROCEDURE — 97161 PT EVAL LOW COMPLEX 20 MIN: CPT

## 2024-12-28 PROCEDURE — 80061 LIPID PANEL: CPT

## 2024-12-28 PROCEDURE — 63600175 PHARM REV CODE 636 W HCPCS

## 2024-12-28 PROCEDURE — 85025 COMPLETE CBC W/AUTO DIFF WBC: CPT | Mod: 91 | Performed by: NURSE PRACTITIONER

## 2024-12-28 PROCEDURE — 83935 ASSAY OF URINE OSMOLALITY: CPT | Performed by: NURSE PRACTITIONER

## 2024-12-28 PROCEDURE — 94761 N-INVAS EAR/PLS OXIMETRY MLT: CPT

## 2024-12-28 PROCEDURE — 85025 COMPLETE CBC W/AUTO DIFF WBC: CPT

## 2024-12-28 PROCEDURE — 85730 THROMBOPLASTIN TIME PARTIAL: CPT

## 2024-12-28 PROCEDURE — 21000000 HC CCU ICU ROOM CHARGE

## 2024-12-28 PROCEDURE — 25000242 PHARM REV CODE 250 ALT 637 W/ HCPCS

## 2024-12-28 PROCEDURE — 36415 COLL VENOUS BLD VENIPUNCTURE: CPT

## 2024-12-28 PROCEDURE — 99900035 HC TECH TIME PER 15 MIN (STAT)

## 2024-12-28 PROCEDURE — 84300 ASSAY OF URINE SODIUM: CPT | Performed by: NURSE PRACTITIONER

## 2024-12-28 PROCEDURE — 80048 BASIC METABOLIC PNL TOTAL CA: CPT

## 2024-12-28 RX ORDER — FUROSEMIDE 10 MG/ML
20 INJECTION INTRAMUSCULAR; INTRAVENOUS DAILY
Status: DISCONTINUED | OUTPATIENT
Start: 2024-12-28 | End: 2024-12-30

## 2024-12-28 RX ORDER — SODIUM CHLORIDE 9 MG/ML
INJECTION, SOLUTION INTRAVENOUS CONTINUOUS
Status: DISCONTINUED | OUTPATIENT
Start: 2024-12-28 | End: 2024-12-28

## 2024-12-28 RX ORDER — ATORVASTATIN CALCIUM 10 MG/1
10 TABLET, FILM COATED ORAL DAILY
Status: ON HOLD | COMMUNITY
Start: 2024-11-27 | End: 2025-01-05 | Stop reason: HOSPADM

## 2024-12-28 RX ADMIN — PREGABALIN 100 MG: 75 CAPSULE ORAL at 08:12

## 2024-12-28 RX ADMIN — DEXTROSE MONOHYDRATE 15 MG/HR: 50 INJECTION, SOLUTION INTRAVENOUS at 10:12

## 2024-12-28 RX ADMIN — FUROSEMIDE 20 MG: 10 INJECTION, SOLUTION INTRAMUSCULAR; INTRAVENOUS at 08:12

## 2024-12-28 RX ADMIN — IPRATROPIUM BROMIDE AND ALBUTEROL SULFATE 3 ML: .5; 3 SOLUTION RESPIRATORY (INHALATION) at 12:12

## 2024-12-28 RX ADMIN — DEXTROSE MONOHYDRATE 12.5 MG/HR: 50 INJECTION, SOLUTION INTRAVENOUS at 08:12

## 2024-12-28 RX ADMIN — PRAVASTATIN SODIUM 10 MG: 10 TABLET ORAL at 08:12

## 2024-12-28 RX ADMIN — IPRATROPIUM BROMIDE AND ALBUTEROL SULFATE 3 ML: .5; 3 SOLUTION RESPIRATORY (INHALATION) at 08:12

## 2024-12-28 RX ADMIN — APIXABAN 5 MG: 5 TABLET, FILM COATED ORAL at 08:12

## 2024-12-28 RX ADMIN — DEXTROSE MONOHYDRATE 15 MG/HR: 50 INJECTION, SOLUTION INTRAVENOUS at 04:12

## 2024-12-28 RX ADMIN — Medication 6 MG: at 08:12

## 2024-12-28 NOTE — ASSESSMENT & PLAN NOTE
Body mass index is 27.89 kg/m². Morbid obesity complicates all aspects of disease management from diagnostic modalities to treatment.

## 2024-12-28 NOTE — H&P
[facility] - Emergency Dept  Hospital Medicine  History & Physical    Patient Name: Niurka Patel  MRN: [id number]  Patient Class: OP- Observation  Admission Date: 12/27/2024  Attending Physician: Leatha Hernandez MD   Primary Care Provider: Natalia Primary Doctor         Patient information was obtained from patient, past medical records, and ER records.     Subjective:     Principal Problem:Atrial fibrillation with RVR    Chief Complaint:   Chief Complaint   Patient presents with    Palpitations     Pt states she was in a fib 1 month ago and thinks she is back in a-fib again today        HPI: Ms. Patel is an 84 yr old female with a hx of with RVR, asthma, CHF, HTN, hypothyroidism, obesity, CVA, HLD, PE, tremors who presented to the ED with a chief complaint of palpitations.  Upon talking to patient she actually denies ever feeling palpitations or any, fluttering in her chest.  She states that over the last 4 weeks she has had increased generalized weakness and fatigue.  She also endorses productive cough with white sputum, shortness of breath, and leg swelling.  She states that her home health nurse came to check on her today and noticed that she seemed very wobbly while using her cane and advised her to come to the emergency room for further evaluation.  Patient states that she was in AFib about 1 month ago and had cardioversion on 11/26/2024 with successful conversion to normal sinus rhythm.  Per chart review, she was discharged on amiodarone 400 mg b.i.d. for 10 days then 200 mg daily afterwards.  She was also told to continue taking her metoprolol and Eliquis.  She also endorses a history of CHF denies PND and orthopnea.  Denies any recent sick contacts or home oxygen use.  Endorses tobacco use and smokes 1 pack of cigarettes a day and denies alcohol and recreational drug use.  She denies fever, chills, palpitations, diaphoresis, chest pain, abdominal pain, nausea, vomiting, diarrhea, dysuria,  hematuria, lightheadedness, dizziness, and syncope.    Upon arrival to ED, patient afebrile, HR of 122, RR of 18, BP of 103/74, satting 97% on RA.  Workup in the ED revealed sodium of 124, chloride of 93, BNP of 156.  CXR with cardiomegaly.  Scattered linear and ground-glass opacities in the mid lower lung zones.  Blunting of the costophrenic angles compatible mall components of pleural fluid.  Questionable nodular opacity in the right upper lobe near the apex.  Follow-up plain radiographs are recommended.  Patient was given aspirin 325 mg oral and initiated on diltiazem titrating gtt while in the ED. case discussed with ED provider and patient will be placed under observation for further management.    No past medical history on file.    No past surgical history on file.    Review of patient's allergies indicates:  No Known Allergies    No current facility-administered medications on file prior to encounter.     Current Outpatient Medications on File Prior to Encounter   Medication Sig    amiodarone (PACERONE) 200 MG Tab Take 1 tablet by mouth once daily.    azelastine (ASTELIN) 137 mcg (0.1 %) nasal spray 1 spray (137 mcg total) by Nasal route 2 (two) times daily as needed for Rhinitis.    benzocaine-menthoL 15-3.6 mg Lozg 1 each by Mucous Membrane route every 4 (four) hours as needed.    benzocaine-menthoL 6-10 mg lozenge Take 1 lozenge by mouth every 2 (two) hours as needed (Sore Throat).    cetirizine (ZYRTEC) 10 MG tablet Take 10 mg by mouth.    ELIQUIS 5 mg Tab Take 5 mg by mouth 2 (two) times daily.    ergocalciferol (ERGOCALCIFEROL) 50,000 unit Cap TAKE ONE CAPSULE BY MOUTH EVERY 30 DAYS    furosemide (LASIX) 20 MG tablet Take 1 tablet every day by oral route as needed.    guaiFENesin (MUCINEX) 600 mg 12 hr tablet Take 2 tablets (1,200 mg total) by mouth 2 (two) times daily.    hydroCHLOROthiazide (MICROZIDE) 12.5 mg capsule Take 12.5 mg by mouth.    hydrOXYzine HCL (ATARAX) 25 MG tablet TAKE ONE TABLET BY  MOUTH THREE TIMES DAILY FOR 5 DAYS    ibandronate (BONIVA) 150 mg tablet TAKE ONE TABLET BY MOUTH EVERY 30 DAYS IN THE MORNING    JARDIANCE 10 mg tablet Take 1 tablet by mouth once daily.    levocetirizine (XYZAL) 5 MG tablet Take 1 tablet (5 mg total) by mouth every evening.    metoprolol tartrate (LOPRESSOR) 50 MG tablet Take 1 tablet by mouth 2 (two) times daily.    pravastatin (PRAVACHOL) 10 MG tablet Take 1 tablet by mouth every evening.    pregabalin (LYRICA) 100 MG capsule Take 1 capsule by mouth 2 (two) times daily.    primidone (MYSOLINE) 50 MG Tab Take 5 tablets 3 times a day by oral route.    SYNTHROID 112 mcg tablet Take 1 tablet by mouth once daily.    triamcinolone acetonide 0.1% (KENALOG) 0.1 % cream APPLY A THIN LAYER TO THE AFFECTED AREA(S) BY TOPICAL ROUTE 2 TIMES PER DAY     Family History    None       Tobacco Use    Smoking status: Not on file    Smokeless tobacco: Not on file   Substance and Sexual Activity    Alcohol use: Not on file    Drug use: Not on file    Sexual activity: Not on file     Review of Systems   Constitutional:  Positive for fatigue. Negative for chills, diaphoresis and fever.   Respiratory:  Positive for cough and shortness of breath.    Cardiovascular:  Positive for leg swelling. Negative for chest pain and palpitations.   Gastrointestinal:  Negative for abdominal pain, diarrhea, nausea and vomiting.   Genitourinary:  Negative for dysuria and hematuria.   Neurological:  Positive for weakness (generalized). Negative for dizziness, syncope and light-headedness.     Objective:     Vital Signs (Most Recent):  Temp: 98.1 °F (36.7 °C) (12/27/24 1528)  Pulse: (!) 120 (12/27/24 1730)  Resp: 18 (12/27/24 1528)  BP: 134/86 (12/27/24 1730)  SpO2: 96 % (12/27/24 1730) Vital Signs (24h Range):  Temp:  [98.1 °F (36.7 °C)] 98.1 °F (36.7 °C)  Pulse:  [120-122] 120  Resp:  [18] 18  SpO2:  [96 %-97 %] 96 %  BP: (103-134)/(74-86) 134/86     Weight: 81.6 kg (180 lb)  Body mass index is 30.9  kg/m².     Physical Exam  Vitals reviewed.   Constitutional:       General: She is awake. She is not in acute distress.     Appearance: Normal appearance. She is not ill-appearing or diaphoretic.   Cardiovascular:      Rate and Rhythm: Tachycardia present. Rhythm irregularly irregular.      Heart sounds: Normal heart sounds.      No friction rub. No gallop.      Comments: Irregularly irregular rhythm noted on monitor during interview and exam.  Pulmonary:      Effort: Pulmonary effort is normal. No accessory muscle usage or respiratory distress.      Breath sounds: Wheezing (Diffuse expiratory) and rales (bibasilar) present.   Abdominal:      General: Abdomen is flat. Bowel sounds are normal.      Palpations: Abdomen is soft.      Tenderness: There is no abdominal tenderness. There is no guarding or rebound.   Musculoskeletal:      Right lower le+ Pitting Edema present.      Left lower le+ Pitting Edema present.   Skin:     General: Skin is warm and dry.   Neurological:      Mental Status: She is alert and oriented to person, place, and time.   Psychiatric:         Behavior: Behavior is cooperative.                Significant Labs: All pertinent labs within the past 24 hours have been reviewed.  CBC:   Recent Labs   Lab 24  1600   WBC 9.90   HGB 12.7   HCT 38.4        CMP:   Recent Labs   Lab 24  1600   *   K 4.3   CL 93*   CO2 26      BUN 13   CREATININE 0.6   CALCIUM 9.1   PROT 6.7   ALBUMIN 3.5   BILITOT 0.6   ALKPHOS 121   AST 35   ALT 24   ANIONGAP 5*     Cardiac Markers:   Recent Labs   Lab 24  1600   *     Coagulation:   Recent Labs   Lab 24  1600   INR 1.1     Magnesium:   Recent Labs   Lab 24  1600   MG 2.1     Troponin:   Recent Labs   Lab 24  1600   TROPONINIHS 13.6       Significant Imaging:   Imaging Results              X-Ray Chest AP Portable (Final result)  Result time 24 16:22:14      Final result by Yaquelin Jacobs IV, MD  (12/27/24 16:22:14)                   Impression:      Suboptimal positioning.    Cardiomegaly.    Scattered linear and ground-glass opacities in the mid lower lung zones.    Blunting of the costophrenic angles compatible small components of pleural fluid.    Questionable nodular opacity in the right upper lobe near the apex.  Follow-up plain radiographs are recommended to determine at this represents a persistent finding.  If such, CT would be necessary for further investigation.      Electronically signed by: Yaquelin Jacobs  Date:    12/27/2024  Time:    16:22               Narrative:    EXAMINATION:  XR CHEST AP PORTABLE    CLINICAL HISTORY:  CHF;    COMPARISON:  None.    FINDINGS:  The patient is significantly rotated.    The heart is enlarged.  The central pulmonary vasculature does not appear acutely engorged.  There is tortuosity of the aorta and branches.    Linear parenchymal opacities are observed within the left mid and lower lung zone and in the right lung base.  Additionally, there are mild ground-glass opacities in the right mid lung zone and bilateral lower lung zones.  Blunting of the costophrenic angles bilaterally suggest small components of pleural fluid.    There is a nodular appearing opacity within the left pulmonary apex.  Follow-up radiographs are recommended to determine if this is a persistent abnormality.                                     Assessment/Plan:     * Atrial fibrillation with RVR  Patient has paroxysmal (<7 days) atrial fibrillation. Patient is currently in atrial fibrillation. [id number] Score: 4. The patients heart rate in the last 24 hours is as follows:  Pulse  Min: 118  Max: 122     Antiarrhythmics  diltiaZEM 100 mg in dextrose 5% 100 mL IVPB (ready to mix) (titrating), Continuous, Intravenous    Anticoagulants  apixaban tablet 5 mg, 2 times daily, Oral    Plan  - Replete lytes with a goal of K>4, Mg >2  - Patient is anticoagulated, see medications listed above.  - Patient's  afib is currently uncontrolled. Will continue current treatment  - Underwent cardioversion on 11/26/2024 with conversion to NSR, was sent home on amio 400 mg BID for 10 days then 200 mg daily afterwards, says she has been compliant. Was also told to resume Toprol and eliquis.  - Initiated on diltiazem gtt in the ED  - Cardiology consulted, appreciate recs  - NPO at midnight    Hyponatremia  The patient's most recent sodium results are listed below.  Recent Labs     12/27/24  1600   *     Plan  - Correct the sodium by 4-6mEq in 24 hours.   - Obtain the following studies: Urine sodium, urine osmolality, serum osmolality, AM cortisol, or TSH, T4.  - Will hold on fluids for now given patient appears slightly fluid overloaded.  - Monitor sodium Daily.   - Patient hyponatremia is stable  - Nephrology consulted, appreciate recs    Obesity (BMI 30-39.9)  Body mass index is 30.9 kg/m². Morbid obesity complicates all aspects of disease management from diagnostic modalities to treatment.        Hypothyroidism  - Continue home synthroid  - TSH pending      Hypertension, essential, benign  Latest blood pressure and vitals reviewed-   Temp:  [98.1 °F (36.7 °C)]   Pulse:  [118-122]   Resp:  [18-20]   BP: (103-143)/(74-97)   SpO2:  [95 %-97 %] .     Home meds for hypertension were reviewed and noted below.   Hypertension Medications               furosemide (LASIX) 20 MG tablet Take 1 tablet every day by oral route as needed.    hydroCHLOROthiazide (MICROZIDE) 12.5 mg capsule Take 12.5 mg by mouth.    metoprolol tartrate (LOPRESSOR) 50 MG tablet Take 1 tablet by mouth 2 (two) times daily.          - Holding home lasix and HCTZ in the setting of hyponatremia  - Continue home metoprolol with parameters    Congestive heart failure  Patient has  hx of  heart failure that is Acute on chronic. On presentation their CHF was decompensated. Evidence of decompensated CHF on presentation includes: edema, dyspnea on exertion (FROST), and  shortness of breath. Most recent BNP and echo results are listed below.  Recent Labs     12/27/24  1600   *     Latest ECHO  No results found for this or any previous visit.    Current Heart Failure Medications       Plan  - Monitor strict I&Os and daily weights.    - Place on telemetry  - Low sodium diet  - Place on fluid restriction of 1.5 L.   - Cardiology has been consulted  - The patient's volume status is stable but not at their baseline as indicated by edema, crackles on lung auscultation, dyspnea on exertion (FROST), and shortness of breath  - I suspect the pt being in Afib is also causing some component of CHF exacerbation. However, on review of pt's last echo, revealed normal EF and undetermined diastolic function.  - Cardiology consulted, appreciate recs  - NPO at midnight  - Will hold off on lasix at this time as patient is hyponatremic    Asthma  - DuoNebs Q6H PRN        VTE Risk Mitigation (From admission, onward)           Ordered     apixaban tablet 5 mg  2 times daily         12/27/24 1807     Reason for No Pharmacological VTE Prophylaxis  Once        Question:  Reasons:  Answer:  Already adequately anticoagulated on oral Anticoagulants    12/27/24 1748     IP VTE HIGH RISK PATIENT  Once         12/27/24 1748     Place sequential compression device  Until discontinued         12/27/24 1748                         On 12/27/2024, patient should be placed in hospital observation services under my care in collaboration with Leatha Hernandez MD.           [facility]  Department of Hospital Medicine  Formerly [facility] - Emergency Dept

## 2024-12-28 NOTE — PLAN OF CARE
Problem: Physical Therapy  Goal: Physical Therapy Goal  Description: Goals to be met by: 25     Patient will increase functional independence with mobility by performin. Supine to sit with Modified [place]  2. Sit to supine with Modified [place]  3. Sit to stand transfer with Modified [place]  4. Bed to chair transfer with Modified [place] using Rolling Walker  5. Gait  x 150 feet with Modified [place] using Rolling Walker maintaining SPO2 above 90%.     Outcome: Progressing

## 2024-12-28 NOTE — PT/OT/SLP EVAL
Physical Therapy Evaluation    Patient Name:  Niurka Patel   MRN:  [id number]    Recommendations:     Discharge Recommendations: Low Intensity Therapy   Discharge Equipment Recommendations: none   Barriers to discharge: Decreased caregiver support    Assessment:     Niurka Patel is a 84 y.o. female admitted with a medical diagnosis of Atrial fibrillation with RVR.  She presents with the following impairments/functional limitations: weakness, impaired endurance, gait instability, impaired functional mobility, impaired cardiopulmonary response to activity, edema Pt agreeable to PT.Sat EOB x 10 minutes prior to ambulation. Ambulated 110 ft with RW CGA on 2 l NC. SPO2 86 to 91%. Max  bpm. Pt willl benfit from ongoing PT to maximize saftey and functional independence.    Rehab Prognosis: Good; patient would benefit from acute skilled PT services to address these deficits and reach maximum level of function.    Recent Surgery: * No surgery found *      Plan:     During this hospitalization, patient to be seen 5 x/week to address the identified rehab impairments via gait training, therapeutic activities, therapeutic exercises and progress toward the following goals:    Plan of Care Expires:  01/28/25    Subjective     Chief Complaint: weakness, tired  Patient/Family Comments/goals: return home  Pain/Comfort:  Pain Rating 1: 0/10  Pain Rating Post-Intervention 1: 0/10    Patients cultural, spiritual, [employer] conflicts given the current situation:      Living Environment:  Lives alone in apartment, drives  Prior to admission, patients level of function was mod I.  Equipment used at home: bath bench, cane, straight.  DME owned (not currently used): none.  Upon discharge, patient will have assistance from daughter.    Objective:     Communicated with nurse prior to session.  Patient found HOB elevated with peripheral IV, oxygen, telemetry  upon PT entry to room.    General Precautions: Standard, fall  Orthopedic  Precautions:N/A   Braces: N/A  Respiratory Status: Nasal cannula, flow 2 L/min    Exams:  Cognitive Exam:  Patient is oriented to Person, Place, Time, and Situation  Gross Motor Coordination:  WFL  Skin Integrity/Edema:      -       Edema: Moderate B feet  RLE Strength: WFL  LLE Strength: WFL    Functional Mobility:  Bed Mobility:     Supine to Sit: stand by assistance  Sit to Supine: stand by assistance  Transfers:     Sit to Stand:  contact guard assistance with rolling walker  Gait: CGA with RW x 110 ft       AM-PAC 6 CLICK MOBILITY  Total Score:18       Treatment & Education:  Educated in PT roles and goals, DC recommendations, fall precautions, use of call light    Patient left HOB elevated with all lines intact and call button in reach.    GOALS:   Multidisciplinary Problems       Physical Therapy Goals          Problem: Physical Therapy    Goal Priority Disciplines Outcome Interventions   Physical Therapy Goal     PT, PT/OT Progressing    Description: Goals to be met by: 25     Patient will increase functional independence with mobility by performin. Supine to sit with Modified [place]  2. Sit to supine with Modified [place]  3. Sit to stand transfer with Modified [place]  4. Bed to chair transfer with Modified [place] using Rolling Walker  5. Gait  x 150 feet with Modified [place] using Rolling Walker maintaining SPO2 above 90%.                          History:     No past medical history on file.    No past surgical history on file.    Time Tracking:     PT Received On: 24  PT Start Time: 0942     PT Stop Time: 1007  PT Total Time (min): 25 min     Billable Minutes: Evaluation 10 and Therapeutic Activity 15      2024

## 2024-12-28 NOTE — ASSESSMENT & PLAN NOTE
Latest blood pressure and vitals reviewed-   Temp:  [96.7 °F (35.9 °C)-98.1 °F (36.7 °C)]   Pulse:  []   Resp:  [18-28]   BP: ()/(57-97)   SpO2:  [91 %-97 %] .     Home meds for hypertension were reviewed and noted below.   Hypertension Medications               furosemide (LASIX) 20 MG tablet Take 1 tablet every day by oral route as needed.    hydroCHLOROthiazide (MICROZIDE) 12.5 mg capsule Take 12.5 mg by mouth.    metoprolol tartrate (LOPRESSOR) 50 MG tablet Take 1 tablet by mouth 2 (two) times daily.          - Holding home HCTZ in the setting of hyponatremia  - Continue home metoprolol with parameters  - Lasix with caution

## 2024-12-28 NOTE — ASSESSMENT & PLAN NOTE
Patient has paroxysmal (<7 days) atrial fibrillation. Patient is currently in atrial fibrillation. [id number] Score: 4. The patients heart rate in the last 24 hours is as follows:  Pulse  Min: 95  Max: 135     Antiarrhythmics  diltiaZEM 100 mg in dextrose 5% 100 mL IVPB (ready to mix) (titrating), Continuous, Intravenous    Anticoagulants  apixaban tablet 5 mg, 2 times daily, Oral    Plan  - Replete lytes with a goal of K>4, Mg >2  - Patient is anticoagulated, see medications listed above.  - Patient's afib is currently uncontrolled. Will continue current treatment  - Underwent cardioversion on 11/26/2024 with conversion to NSR, was sent home on amio 400 mg BID for 10 days then 200 mg daily afterwards, says she has been compliant. Was also told to resume Toprol and eliquis.  - Initiated on diltiazem gtt in the ED  - Cardiology consulted  - NPO at midnight for cardioversion tomorrow

## 2024-12-28 NOTE — ASSESSMENT & PLAN NOTE
- Continue home synthroid  - TSH elevated, Free T4 normal  - She will need repeat TFTs and Synthroid dose adjusting once heart rates improve.  - Repeat TSH couldn't be ordered

## 2024-12-28 NOTE — SUBJECTIVE & OBJECTIVE
No past medical history on file.    No past surgical history on file.    Review of patient's allergies indicates:  No Known Allergies    No current facility-administered medications on file prior to encounter.     Current Outpatient Medications on File Prior to Encounter   Medication Sig    amiodarone (PACERONE) 200 MG Tab Take 1 tablet by mouth once daily.    azelastine (ASTELIN) 137 mcg (0.1 %) nasal spray 1 spray (137 mcg total) by Nasal route 2 (two) times daily as needed for Rhinitis.    benzocaine-menthoL 15-3.6 mg Lozg 1 each by Mucous Membrane route every 4 (four) hours as needed.    benzocaine-menthoL 6-10 mg lozenge Take 1 lozenge by mouth every 2 (two) hours as needed (Sore Throat).    cetirizine (ZYRTEC) 10 MG tablet Take 10 mg by mouth.    ELIQUIS 5 mg Tab Take 5 mg by mouth 2 (two) times daily.    ergocalciferol (ERGOCALCIFEROL) 50,000 unit Cap TAKE ONE CAPSULE BY MOUTH EVERY 30 DAYS    furosemide (LASIX) 20 MG tablet Take 1 tablet every day by oral route as needed.    guaiFENesin (MUCINEX) 600 mg 12 hr tablet Take 2 tablets (1,200 mg total) by mouth 2 (two) times daily.    hydroCHLOROthiazide (MICROZIDE) 12.5 mg capsule Take 12.5 mg by mouth.    hydrOXYzine HCL (ATARAX) 25 MG tablet TAKE ONE TABLET BY MOUTH THREE TIMES DAILY FOR 5 DAYS    ibandronate (BONIVA) 150 mg tablet TAKE ONE TABLET BY MOUTH EVERY 30 DAYS IN THE MORNING    JARDIANCE 10 mg tablet Take 1 tablet by mouth once daily.    levocetirizine (XYZAL) 5 MG tablet Take 1 tablet (5 mg total) by mouth every evening.    metoprolol tartrate (LOPRESSOR) 50 MG tablet Take 1 tablet by mouth 2 (two) times daily.    pravastatin (PRAVACHOL) 10 MG tablet Take 1 tablet by mouth every evening.    pregabalin (LYRICA) 100 MG capsule Take 1 capsule by mouth 2 (two) times daily.    primidone (MYSOLINE) 50 MG Tab Take 5 tablets 3 times a day by oral route.    SYNTHROID 112 mcg tablet Take 1 tablet by mouth once daily.    triamcinolone acetonide 0.1% (KENALOG)  0.1 % cream APPLY A THIN LAYER TO THE AFFECTED AREA(S) BY TOPICAL ROUTE 2 TIMES PER DAY     Family History    None       Tobacco Use    Smoking status: Not on file    Smokeless tobacco: Not on file   Substance and Sexual Activity    Alcohol use: Not on file    Drug use: Not on file    Sexual activity: Not on file     Review of Systems   Constitutional:  Positive for fatigue. Negative for chills, diaphoresis and fever.   Respiratory:  Positive for cough and shortness of breath.    Cardiovascular:  Positive for leg swelling. Negative for chest pain and palpitations.   Gastrointestinal:  Negative for abdominal pain, diarrhea, nausea and vomiting.   Genitourinary:  Negative for dysuria and hematuria.   Neurological:  Positive for weakness (generalized). Negative for dizziness, syncope and light-headedness.     Objective:     Vital Signs (Most Recent):  Temp: 98.1 °F (36.7 °C) (12/27/24 1528)  Pulse: (!) 120 (12/27/24 1730)  Resp: 18 (12/27/24 1528)  BP: 134/86 (12/27/24 1730)  SpO2: 96 % (12/27/24 1730) Vital Signs (24h Range):  Temp:  [98.1 °F (36.7 °C)] 98.1 °F (36.7 °C)  Pulse:  [120-122] 120  Resp:  [18] 18  SpO2:  [96 %-97 %] 96 %  BP: (103-134)/(74-86) 134/86     Weight: 81.6 kg (180 lb)  Body mass index is 30.9 kg/m².     Physical Exam  Vitals reviewed.   Constitutional:       General: She is awake. She is not in acute distress.     Appearance: Normal appearance. She is not ill-appearing or diaphoretic.   Cardiovascular:      Rate and Rhythm: Tachycardia present. Rhythm irregularly irregular.      Heart sounds: Normal heart sounds.      No friction rub. No gallop.      Comments: Irregularly irregular rhythm noted on monitor during interview and exam.  Pulmonary:      Effort: Pulmonary effort is normal. No accessory muscle usage or respiratory distress.      Breath sounds: Wheezing (Diffuse expiratory) and rales (bibasilar) present.   Abdominal:      General: Abdomen is flat. Bowel sounds are normal.       Palpations: Abdomen is soft.      Tenderness: There is no abdominal tenderness. There is no guarding or rebound.   Musculoskeletal:      Right lower le+ Pitting Edema present.      Left lower le+ Pitting Edema present.   Skin:     General: Skin is warm and dry.   Neurological:      Mental Status: She is alert and oriented to person, place, and time.   Psychiatric:         Behavior: Behavior is cooperative.                Significant Labs: All pertinent labs within the past 24 hours have been reviewed.  CBC:   Recent Labs   Lab 24  1600   WBC 9.90   HGB 12.7   HCT 38.4        CMP:   Recent Labs   Lab 24  1600   *   K 4.3   CL 93*   CO2 26      BUN 13   CREATININE 0.6   CALCIUM 9.1   PROT 6.7   ALBUMIN 3.5   BILITOT 0.6   ALKPHOS 121   AST 35   ALT 24   ANIONGAP 5*     Cardiac Markers:   Recent Labs   Lab 24  1600   *     Coagulation:   Recent Labs   Lab 24  1600   INR 1.1     Magnesium:   Recent Labs   Lab 24  1600   MG 2.1     Troponin:   Recent Labs   Lab 24  1600   TROPONINIHS 13.6       Significant Imaging:   Imaging Results              X-Ray Chest AP Portable (Final result)  Result time 24 16:22:14      Final result by Yaquelin Jacobs IV, MD (24 16:22:14)                   Impression:      Suboptimal positioning.    Cardiomegaly.    Scattered linear and ground-glass opacities in the mid lower lung zones.    Blunting of the costophrenic angles compatible small components of pleural fluid.    Questionable nodular opacity in the right upper lobe near the apex.  Follow-up plain radiographs are recommended to determine at this represents a persistent finding.  If such, CT would be necessary for further investigation.      Electronically signed by: Yaquelin Jacobs  Date:    2024  Time:    16:22               Narrative:    EXAMINATION:  XR CHEST AP PORTABLE    CLINICAL HISTORY:  CHF;    COMPARISON:  None.    FINDINGS:  The patient is  significantly rotated.    The heart is enlarged.  The central pulmonary vasculature does not appear acutely engorged.  There is tortuosity of the aorta and branches.    Linear parenchymal opacities are observed within the left mid and lower lung zone and in the right lung base.  Additionally, there are mild ground-glass opacities in the right mid lung zone and bilateral lower lung zones.  Blunting of the costophrenic angles bilaterally suggest small components of pleural fluid.    There is a nodular appearing opacity within the left pulmonary apex.  Follow-up radiographs are recommended to determine if this is a persistent abnormality.

## 2024-12-28 NOTE — ASSESSMENT & PLAN NOTE
Patient has  hx of  heart failure that is Acute on chronic. On presentation their CHF was decompensated. Evidence of decompensated CHF on presentation includes: edema, dyspnea on exertion (FROST), and shortness of breath. Most recent BNP and echo results are listed below.      Recent Labs     12/27/24  1600   *       Latest ECHO  No results found for this or any previous visit.    Current Heart Failure Medications  furosemide injection 20 mg, Daily, Intravenous    Plan  - Monitor strict I&Os and daily weights.    - Place on telemetry  - Low sodium diet  - Place on fluid restriction of 1.5 L.   - Cardiology has been consulted  - The patient's volume status is stable but not at their baseline as indicated by edema, crackles on lung auscultation, dyspnea on exertion (FROST), and shortness of breath  - on review of pt's last echo, revealed normal EF and undetermined diastolic function.  - Cardiology consulted, appreciate recs  - IV Lasix with caution  - hold hydrochlorothiazide with hyponatremia.

## 2024-12-28 NOTE — PROGRESS NOTES
[facility] Medicine  Progress Note    Patient Name: Niurka Patel  MRN: [id number]  Patient Class: IP- Inpatient   Admission Date: 12/27/2024  Length of Stay: 0 days  Attending Physician: Ani Moss, *  Primary Care Provider: No, Primary Doctor        Subjective     Principal Problem:Atrial fibrillation with RVR        HPI:  Ms. Patel is an 84 yr old female with a hx of with RVR, asthma, CHF, HTN, hypothyroidism, obesity, CVA, HLD, PE, tremors who presented to the ED with a chief complaint of palpitations.  Upon talking to patient she actually denies ever feeling palpitations or any, fluttering in her chest.  She states that over the last 4 weeks she has had increased generalized weakness and fatigue.  She also endorses productive cough with white sputum, shortness of breath, and leg swelling.  She states that her home health nurse came to check on her today and noticed that she seemed very wobbly while using her cane and advised her to come to the emergency room for further evaluation.  Patient states that she was in AFib about 1 month ago and had cardioversion on 11/26/2024 with successful conversion to normal sinus rhythm.  Per chart review, she was discharged on amiodarone 400 mg b.i.d. for 10 days then 200 mg daily afterwards.  She was also told to continue taking her metoprolol and Eliquis.  She also endorses a history of CHF denies PND and orthopnea.  Denies any recent sick contacts or home oxygen use.  Endorses tobacco use and smokes 1 pack of cigarettes a day and denies alcohol and recreational drug use.  She denies fever, chills, palpitations, diaphoresis, chest pain, abdominal pain, nausea, vomiting, diarrhea, dysuria, hematuria, lightheadedness, dizziness, and syncope.    Upon arrival to ED, patient afebrile, HR of 122, RR of 18, BP of 103/74, satting 97% on RA.  Workup in the ED revealed sodium of 124, chloride of 93, BNP of 156.  CXR with cardiomegaly.  Scattered  linear and ground-glass opacities in the mid lower lung zones.  Blunting of the costophrenic angles compatible mall components of pleural fluid.  Questionable nodular opacity in the right upper lobe near the apex.  Follow-up plain radiographs are recommended.  Patient was given aspirin 325 mg oral and initiated on diltiazem titrating gtt while in the ED. case discussed with ED provider and patient will be placed under observation for further management.    Overview/Hospital Course:  Patient was admitted for atrial fibrillation in RVR and had hyponatremia.  Patient was started on diltiazem drip.  Cardiology was consulted.  Cardioversion planned for 12/29.  Electrolytes repleted.  For hyponatremia, HCTZ was stopped. Lasix was given with caution for CHF.  Nephrology closely followed the patient.  At presentation, TSH was elevated but free T4 was normal, held off increasing Synthroid given active AFib with heart rates 130-140.      Interval History:  Patient is seen and examined at bedside this morning.  AFib in RVR, heart rate 120s was on diltiazem drip.  Cardiology consulted.    Sodium 128, held hydrochlorothiazide, nephrology following.    Gave low-dose IV Lasix for CHF which is likely triggering Afib?    TSH elevated, free T4 normal, could have underlying hypothyroidism causing CHF/hyponatremia.She will need repeat TFTs and Synthroid dose adjusting once heart rates improve.        Review of Systems  Objective:     Vital Signs (Most Recent):  Temp: 96.7 °F (35.9 °C) (12/28/24 1142)  Pulse: 99 (12/28/24 1430)  Resp: (!) 22 (12/28/24 1430)  BP: (!) 100/57 (12/28/24 1430)  SpO2: 95 % (12/28/24 1430) Vital Signs (24h Range):  Temp:  [96.7 °F (35.9 °C)-98.1 °F (36.7 °C)] 96.7 °F (35.9 °C)  Pulse:  [] 99  Resp:  [18-28] 22  SpO2:  [91 %-97 %] 95 %  BP: ()/(57-97) 100/57     Weight: 73.7 kg (162 lb 7.7 oz)  Body mass index is 27.89 kg/m².    Intake/Output Summary (Last 24 hours) at 12/28/2024 1457  Last data  filed at 2024 1337  Gross per 24 hour   Intake 496.79 ml   Output 1000 ml   Net -503.21 ml         Physical Exam  Vitals reviewed.   Constitutional:       General: She is awake. She is not in acute distress.     Appearance: Normal appearance. She is not ill-appearing or diaphoretic.   Cardiovascular:      Rate and Rhythm: Tachycardia present. Rhythm irregularly irregular.      Heart sounds: Normal heart sounds.      No friction rub. No gallop.      Comments: Irregularly irregular rhythm noted on monitor during interview and exam.  Pulmonary:      Effort: Pulmonary effort is normal. No accessory muscle usage or respiratory distress.      Breath sounds: Wheezing (Diffuse expiratory) and rales (bibasilar) present.   Abdominal:      General: Abdomen is flat. Bowel sounds are normal.      Palpations: Abdomen is soft.      Tenderness: There is no abdominal tenderness. There is no guarding or rebound.   Musculoskeletal:      Right lower le+ Pitting Edema present.      Left lower le+ Pitting Edema present.   Skin:     General: Skin is warm and dry.   Neurological:      Mental Status: She is alert and oriented to person, place, and time.   Psychiatric:         Behavior: Behavior is cooperative.             Significant Labs: All pertinent labs within the past 24 hours have been reviewed.    Significant Imaging: I have reviewed all pertinent imaging results/findings within the past 24 hours.    Assessment and Plan     * Atrial fibrillation with RVR  Patient has paroxysmal (<7 days) atrial fibrillation. Patient is currently in atrial fibrillation. [id number] Score: 4. The patients heart rate in the last 24 hours is as follows:  Pulse  Min: 95  Max: 135     Antiarrhythmics  diltiaZEM 100 mg in dextrose 5% 100 mL IVPB (ready to mix) (titrating), Continuous, Intravenous    Anticoagulants  apixaban tablet 5 mg, 2 times daily, Oral    Plan  - Replete lytes with a goal of K>4, Mg >2  - Patient is anticoagulated, see  medications listed above.  - Patient's afib is currently uncontrolled. Will continue current treatment  - Underwent cardioversion on 11/26/2024 with conversion to NSR, was sent home on amio 400 mg BID for 10 days then 200 mg daily afterwards, says she has been compliant. Was also told to resume Toprol and eliquis.  - Initiated on diltiazem gtt in the ED  - Cardiology consulted  - NPO at midnight for cardioversion tomorrow    Hyponatremia  The patient's most recent sodium results are listed below.  Recent Labs     12/27/24  1600 12/27/24  2315 12/28/24  0345   * 128* 128*       Plan  - Correct the sodium by 4-6mEq in 24 hours.   - Urine sodium, urine osmolality, serum osmolality, AM cortisol, or TSH, T4.  - Will hold on fluids for now given patient appears slightly fluid overloaded.  - Monitor sodium Daily.   - Patient hyponatremia is stable  - Nephrology consulted, appreciate recs  - Hold HCTZ  - IV lasix for diuresis with caution  - TSH elevated, Free T4 normal - needs repeat labs with synthyroid  - Adjust dose after HR better controlled        Obesity (BMI 30-39.9)  Body mass index is 27.89 kg/m². Morbid obesity complicates all aspects of disease management from diagnostic modalities to treatment.        Hypothyroidism  - Continue home synthroid  - TSH elevated, Free T4 normal  - She will need repeat TFTs and Synthroid dose adjusting once heart rates improve.  - Repeat TSH couldn't be ordered        Hypertension, essential, benign  Latest blood pressure and vitals reviewed-   Temp:  [96.7 °F (35.9 °C)-98.1 °F (36.7 °C)]   Pulse:  []   Resp:  [18-28]   BP: ()/(57-97)   SpO2:  [91 %-97 %] .     Home meds for hypertension were reviewed and noted below.   Hypertension Medications               furosemide (LASIX) 20 MG tablet Take 1 tablet every day by oral route as needed.    hydroCHLOROthiazide (MICROZIDE) 12.5 mg capsule Take 12.5 mg by mouth.    metoprolol tartrate (LOPRESSOR) 50 MG tablet Take 1  tablet by mouth 2 (two) times daily.          - Holding home HCTZ in the setting of hyponatremia  - Continue home metoprolol with parameters  - Lasix with caution    Congestive heart failure  Patient has  hx of  heart failure that is Acute on chronic. On presentation their CHF was decompensated. Evidence of decompensated CHF on presentation includes: edema, dyspnea on exertion (FROST), and shortness of breath. Most recent BNP and echo results are listed below.      Recent Labs     12/27/24  1600   *       Latest ECHO  No results found for this or any previous visit.    Current Heart Failure Medications  furosemide injection 20 mg, Daily, Intravenous    Plan  - Monitor strict I&Os and daily weights.    - Place on telemetry  - Low sodium diet  - Place on fluid restriction of 1.5 L.   - Cardiology has been consulted  - The patient's volume status is stable but not at their baseline as indicated by edema, crackles on lung auscultation, dyspnea on exertion (FROST), and shortness of breath  - on review of pt's last echo, revealed normal EF and undetermined diastolic function.  - Cardiology consulted, appreciate recs  - IV Lasix with caution  - hold hydrochlorothiazide with hyponatremia.        Asthma  - DuoNebs Q6H PRN  - Not in active exacerbation        VTE Risk Mitigation (From admission, onward)           Ordered     apixaban tablet 5 mg  2 times daily         12/27/24 1807     Reason for No Pharmacological VTE Prophylaxis  Once        Question:  Reasons:  Answer:  Already adequately anticoagulated on oral Anticoagulants    12/27/24 1748     IP VTE HIGH RISK PATIENT  Once         12/27/24 1748     Place sequential compression device  Until discontinued         12/27/24 1748                    Discharge Planning   CYNTHIA:      Code Status: Full Code   Medical Readiness for Discharge Date:                            Ani Moss MD  Department of Hospital Medicine   [facility]

## 2024-12-28 NOTE — ASSESSMENT & PLAN NOTE
The patient's most recent sodium results are listed below.  Recent Labs     12/27/24  1600 12/27/24  2315 12/28/24  0345   * 128* 128*       Plan  - Correct the sodium by 4-6mEq in 24 hours.   - Urine sodium, urine osmolality, serum osmolality, AM cortisol, or TSH, T4.  - Will hold on fluids for now given patient appears slightly fluid overloaded.  - Monitor sodium Daily.   - Patient hyponatremia is stable  - Nephrology consulted, appreciate recs  - Hold HCTZ  - IV lasix for diuresis with caution  - TSH elevated, Free T4 normal - needs repeat labs with synthyroid  - Adjust dose after HR better controlled

## 2024-12-28 NOTE — PLAN OF CARE
10:30am      attempted to complete assessment with pt and pt declined and stated to call emergency contact.     11:47am    attempted to call emergency contact Gabriela Frances [phone].  left a voicemail requesting a call back.

## 2024-12-28 NOTE — HPI
Ms. Patel is an 84 yr old female with a hx of with RVR, asthma, CHF, HTN, hypothyroidism, obesity, CVA, HLD, PE, tremors who presented to the ED with a chief complaint of palpitations.  Upon talking to patient she actually denies ever feeling palpitations or any, fluttering in her chest.  She states that over the last 4 weeks she has had increased generalized weakness and fatigue.  She also endorses productive cough with white sputum, shortness of breath, and leg swelling.  She states that her home health nurse came to check on her today and noticed that she seemed very wobbly while using her cane and advised her to come to the emergency room for further evaluation.  Patient states that she was in AFib about 1 month ago and had cardioversion on 11/26/2024 with successful conversion to normal sinus rhythm.  Per chart review, she was discharged on amiodarone 400 mg b.i.d. for 10 days then 200 mg daily afterwards.  She was also told to continue taking her metoprolol and Eliquis.  She also endorses a history of CHF denies PND and orthopnea.  Denies any recent sick contacts or home oxygen use.  Endorses tobacco use and smokes 1 pack of cigarettes a day and denies alcohol and recreational drug use.  She denies fever, chills, palpitations, diaphoresis, chest pain, abdominal pain, nausea, vomiting, diarrhea, dysuria, hematuria, lightheadedness, dizziness, and syncope.    Upon arrival to ED, patient afebrile, HR of 122, RR of 18, BP of 103/74, satting 97% on RA.  Workup in the ED revealed sodium of 124, chloride of 93, BNP of 156.  CXR with cardiomegaly.  Scattered linear and ground-glass opacities in the mid lower lung zones.  Blunting of the costophrenic angles compatible mall components of pleural fluid.  Questionable nodular opacity in the right upper lobe near the apex.  Follow-up plain radiographs are recommended.  Patient was given aspirin 325 mg oral and initiated on diltiazem titrating gtt while in the ED. case  discussed with ED provider and patient will be placed under observation for further management.

## 2024-12-28 NOTE — CONSULTS
[facility]  Department of Cardiology  Consult Note      PATIENT NAME: Niurka Patel    MRN: [id number]  TODAY'S DATE: 12/28/2024  ADMIT DATE: 12/27/2024                          CONSULT REQUESTED BY: Ani Moss, *    SUBJECTIVE     PRINCIPAL PROBLEM: Atrial fibrillation with RVR    HPI:    Ms. Patel is an 84 year old female who presented to the ER with complaints of palpitations. She has a known hx of PAF and was cardioverted about a month ago. She was found to be in Afib with RVR on arrival  as well as some evidence of CHF.  Patient seen resting in bed with no acute distress noted.  Seen lying relatively comfortably near flat.  She has been seen by Nephrology for hyponatremia.  Patient had been on amiodarone outpatient is noted to be hypothyroid and thus amiodarone has not been continued.      REASON FOR CONSULT:  From Hospitalist H&P: Ms. Patel is an 84 yr old female with a hx of with RVR, asthma, CHF, HTN, hypothyroidism, obesity, CVA, HLD, PE, tremors who presented to the ED with a chief complaint of palpitations.  Upon talking to patient she actually denies ever feeling palpitations or any, fluttering in her chest.  She states that over the last 4 weeks she has had increased generalized weakness and fatigue.  She also endorses productive cough with white sputum, shortness of breath, and leg swelling.  She states that her home health nurse came to check on her today and noticed that she seemed very wobbly while using her cane and advised her to come to the emergency room for further evaluation.  Patient states that she was in AFib about 1 month ago and had cardioversion on 11/26/2024 with successful conversion to normal sinus rhythm.  Per chart review, she was discharged on amiodarone 400 mg b.i.d. for 10 days then 200 mg daily afterwards.  She was also told to continue taking her metoprolol and Eliquis.  She also endorses a history of CHF denies PND and orthopnea.  Denies any recent  sick contacts or home oxygen use.  Endorses tobacco use and smokes 1 pack of cigarettes a day and denies alcohol and recreational drug use.  She denies fever, chills, palpitations, diaphoresis, chest pain, abdominal pain, nausea, vomiting, diarrhea, dysuria, hematuria, lightheadedness, dizziness, and syncope.     Upon arrival to ED, patient afebrile, HR of 122, RR of 18, BP of 103/74, satting 97% on RA.  Workup in the ED revealed sodium of 124, chloride of 93, BNP of 156.  CXR with cardiomegaly.  Scattered linear and ground-glass opacities in the mid lower lung zones.  Blunting of the costophrenic angles compatible mall components of pleural fluid.  Questionable nodular opacity in the right upper lobe near the apex.  Follow-up plain radiographs are recommended.  Patient was given aspirin 325 mg oral and initiated on diltiazem titrating gtt while in the ED. case discussed with ED provider and patient will be placed under observation for further management.     No past medical history on file.     No past surgical history on file.     Review of patient's allergies indicates:  No Known Allergies     No current facility-administered medications on file prior to encounter.         Review of patient's allergies indicates:  No Known Allergies    No past medical history on file.  No past surgical history on file.        REVIEW OF SYSTEMS  Per HPI    OBJECTIVE     VITAL SIGNS (Most Recent)  Temp: 98 °F (36.7 °C) (12/28/24 0709)  Pulse: 107 (12/28/24 0730)  Resp: (!) 21 (12/28/24 0741)  BP: 108/74 (12/28/24 0730)  SpO2: 95 % (12/28/24 0730)    VENTILATION STATUS  Resp: (!) 21 (12/28/24 0741)  SpO2: 95 % (12/28/24 0730)           I & O (Last 24H):  Intake/Output Summary (Last 24 hours) at 12/28/2024 0914  Last data filed at 12/28/2024 0346  Gross per 24 hour   Intake --   Output 1000 ml   Net -1000 ml       WEIGHTS  Wt Readings from Last 1 Encounters:   12/28/24 0530 73.7 kg (162 lb 7.7 oz)   12/27/24 1528 81.6 kg (180 lb)        PHYSICAL EXAM  CONSTITUTIONAL: NAD  HEENT: Normocephalic               NECK:  No JVD   CVS: S1S2+, iRRR, tachy  LUNGS: Clear  ABDOMEN: nondistended   EXTREMITIES: b/l edema   NEURO: AAO X 3  PSY: Normal affect      HOME MEDICATIONS:  No current facility-administered medications on file prior to encounter.     Current Outpatient Medications on File Prior to Encounter   Medication Sig Dispense Refill    amiodarone (PACERONE) 200 MG Tab Take 1 tablet by mouth once daily.      azelastine (ASTELIN) 137 mcg (0.1 %) nasal spray 1 spray (137 mcg total) by Nasal route 2 (two) times daily as needed for Rhinitis. 30 mL 0    benzocaine-menthoL 15-3.6 mg Lozg 1 each by Mucous Membrane route every 4 (four) hours as needed. 18 lozenge 0    benzocaine-menthoL 6-10 mg lozenge Take 1 lozenge by mouth every 2 (two) hours as needed (Sore Throat). 18 tablet 0    cetirizine (ZYRTEC) 10 MG tablet Take 10 mg by mouth.      ELIQUIS 5 mg Tab Take 5 mg by mouth 2 (two) times daily.      ergocalciferol (ERGOCALCIFEROL) 50,000 unit Cap TAKE ONE CAPSULE BY MOUTH EVERY 30 DAYS      furosemide (LASIX) 20 MG tablet Take 1 tablet every day by oral route as needed.      guaiFENesin (MUCINEX) 600 mg 12 hr tablet Take 2 tablets (1,200 mg total) by mouth 2 (two) times daily. 30 tablet 0    hydroCHLOROthiazide (MICROZIDE) 12.5 mg capsule Take 12.5 mg by mouth.      hydrOXYzine HCL (ATARAX) 25 MG tablet TAKE ONE TABLET BY MOUTH THREE TIMES DAILY FOR 5 DAYS      ibandronate (BONIVA) 150 mg tablet TAKE ONE TABLET BY MOUTH EVERY 30 DAYS IN THE MORNING      JARDIANCE 10 mg tablet Take 1 tablet by mouth once daily.      levocetirizine (XYZAL) 5 MG tablet Take 1 tablet (5 mg total) by mouth every evening. 30 tablet 0    metoprolol tartrate (LOPRESSOR) 50 MG tablet Take 1 tablet by mouth 2 (two) times daily.      pravastatin (PRAVACHOL) 10 MG tablet Take 1 tablet by mouth every evening.      pregabalin (LYRICA) 100 MG capsule Take 1 capsule by mouth 2  (two) times daily.      primidone (MYSOLINE) 50 MG Tab Take 5 tablets 3 times a day by oral route.      SYNTHROID 112 mcg tablet Take 1 tablet by mouth once daily.      triamcinolone acetonide 0.1% (KENALOG) 0.1 % cream APPLY A THIN LAYER TO THE AFFECTED AREA(S) BY TOPICAL ROUTE 2 TIMES PER DAY         SCHEDULED MEDS:   apixaban  5 mg Oral BID    furosemide (LASIX) injection  20 mg Intravenous Daily    levothyroxine  112 mcg Oral Before breakfast    pravastatin  10 mg Oral QHS    pregabalin  100 mg Oral BID       CONTINUOUS INFUSIONS:   dilTIAZem  0-15 mg/hr Intravenous Continuous 12.5 mL/hr at 12/28/24 0809 12.5 mg/hr at 12/28/24 0809       PRN MEDS:  Current Facility-Administered Medications:     acetaminophen, 650 mg, Oral, Q4H PRN    albuterol-ipratropium, 3 mL, Nebulization, Q4H PRN    aluminum-magnesium hydroxide-simethicone, 30 mL, Oral, QID PRN    dextrose 50%, 12.5 g, Intravenous, PRN    dextrose 50%, 25 g, Intravenous, PRN    glucagon (human recombinant), 1 mg, Intramuscular, PRN    glucose, 16 g, Oral, PRN    glucose, 24 g, Oral, PRN    magnesium oxide, 800 mg, Oral, PRN    magnesium oxide, 800 mg, Oral, PRN    melatonin, 6 mg, Oral, Nightly PRN    naloxone, 0.02 mg, Intravenous, PRN    ondansetron, 4 mg, Intravenous, Q6H PRN    potassium bicarbonate, 35 mEq, Oral, PRN    potassium bicarbonate, 50 mEq, Oral, PRN    potassium bicarbonate, 60 mEq, Oral, PRN    potassium, sodium phosphates, 2 packet, Oral, PRN    potassium, sodium phosphates, 2 packet, Oral, PRN    potassium, sodium phosphates, 2 packet, Oral, PRN    senna-docusate 8.6-50 mg, 1 tablet, Oral, BID PRN    sodium chloride 0.9%, 10 mL, Intravenous, Q12H PRN    LABS AND DIAGNOSTICS     CBC LAST 3 DAYS  Recent Labs   Lab 12/27/24  1600 12/28/24  0345   WBC 9.90 8.82   RBC 4.41 4.19   HGB 12.7 12.3   HCT 38.4 36.4*   MCV 87 87   MCH 28.8 29.4   MCHC 33.1 33.8   RDW 15.5* 15.5*    316   MPV 9.4 9.1*   GRAN 76.8*  7.6 73.3*  6.5   LYMPH 10.5*  " 1.0 12.8*  1.1   MONO 9.5  0.9 10.0  0.9   BASO 0.07 0.04   NRBC 0 0       COAGULATION LAST 3 DAYS  Recent Labs   Lab 12/27/24  1600 12/28/24  0345   INR 1.1 1.1   APTT  --  41.6*       CHEMISTRY LAST 3 DAYS  Recent Labs   Lab 12/27/24  1600 12/27/24  2315 12/28/24  0345   * 128* 128*   K 4.3 4.1 3.9   CL 93* 96 97   CO2 26 27 25   ANIONGAP 5* 5* 6*   BUN 13 12 10   CREATININE 0.6 0.6 0.5    121* 94   CALCIUM 9.1 8.5* 8.7   MG 2.1  --  2.0   ALBUMIN 3.5  --   --    PROT 6.7  --   --    ALKPHOS 121  --   --    ALT 24  --   --    AST 35  --   --    BILITOT 0.6  --   --        CARDIAC PROFILE LAST 3 DAYS  Recent Labs   Lab 12/27/24  1600   *   TROPONINIHS 13.6       ENDOCRINE LAST 3 DAYS  Recent Labs   Lab 12/27/24  1600   TSH 10.384*       LAST ARTERIAL BLOOD GAS  ABG  No results for input(s): "PH", "PO2", "PCO2", "HCO3", "BE" in the last 168 hours.    LAST 7 DAYS MICROBIOLOGY   Microbiology Results (last 7 days)       ** No results found for the last 168 hours. **            MOST RECENT IMAGING  X-Ray Chest AP Portable  Narrative: EXAMINATION:  XR CHEST AP PORTABLE    CLINICAL HISTORY:  CHF;    COMPARISON:  None.    FINDINGS:  The patient is significantly rotated.    The heart is enlarged.  The central pulmonary vasculature does not appear acutely engorged.  There is tortuosity of the aorta and branches.    Linear parenchymal opacities are observed within the left mid and lower lung zone and in the right lung base.  Additionally, there are mild ground-glass opacities in the right mid lung zone and bilateral lower lung zones.  Blunting of the costophrenic angles bilaterally suggest small components of pleural fluid.    There is a nodular appearing opacity within the left pulmonary apex.  Follow-up radiographs are recommended to determine if this is a persistent abnormality.  Impression: Suboptimal positioning.    Cardiomegaly.    Scattered linear and ground-glass opacities in the mid lower " lung zones.    Blunting of the costophrenic angles compatible small components of pleural fluid.    Questionable nodular opacity in the right upper lobe near the apex.  Follow-up plain radiographs are recommended to determine at this represents a persistent finding.  If such, CT would be necessary for further investigation.    Electronically signed by: Yaquelin Jacobs  Date:    12/27/2024  Time:    16:22      ECHOCARDIOGRAM RESULTS (last 5)  No results found for this or any previous visit.      CURRENT/PREVIOUS VISIT EKG  Results for orders placed or performed during the hospital encounter of 12/27/24   EKG 12-lead    Collection Time: 12/27/24  3:29 PM   Result Value Ref Range    QRS Duration 104 ms    OHS QTC Calculation 393 ms    Narrative    Test Reason : R06.02,    Vent. Rate : 138 BPM     Atrial Rate : 144 BPM     P-R Int :    ms          QRS Dur : 104 ms      QT Int : 260 ms       P-R-T Axes :    154  41 degrees    QTcB Int : 393 ms    Atrial fibrillation with rapid ventricular response  Right axis deviation  Incomplete right bundle branch block  Possible Right ventricular hypertrophy  Septal infarct ,age undetermined  Abnormal ECG  No previous ECGs available    Referred By: AAAREFERRAL SELF           Confirmed By:            ASSESSMENT/PLAN:     Active Hospital Problems    Diagnosis    *Atrial fibrillation with RVR    Hyponatremia    Congestive heart failure    Asthma    Hypertension, essential, benign    Hypothyroidism    Obesity (BMI 30-39.9)       ASSESSMENT & PLAN:     Afib with RVR  HTN  Hx of CVA  Smoker       RECOMMENDATIONS:    Echo pending. She had an echo in August 2024 which showed preserved EF with no significant valve issues noted.   Patient reports that she has been consistently taking her Eliquis at home.  Discussed with her the option for cardioversion.  Patient is agreeable to proceed.  Keep NPO after midnight.  Continue diltiazem drip titrating for rate control of the heart.  Heart rate seems to  have improved.  Continue IV furosemide 20 mg daily.  Strict I&O.  Trend labs.  Plan is to cardiovert her and continue anticoagulation.  If patient tolerates this well, anticipate discharge home soon in the next 36- 48 hours.        Lisbeth Self NP  Date of Service: 12/28/2024  9:14 AM    I have personally interviewed and examined the patient, I have reviewed the Nurse Practitioner's history and physical, assessment, and plan. I have personally evaluated the patient at bedside and agree with the findings and made appropriate changes as necessary in recommendations.  All pertinent recent labs, imaging and EKGs independently reviewed and interpreted.   In atrial fibrillation with RVR despite Cardizem infusion.  Discussed with the patient regarding cardioversion.  Patient recently had cardioversion in [place].  Patient reports complete compliance with anticoagulation and did not miss any doses.  Patient agreeable to proceed with cardioversion tomorrow.  NPO after midnight.  Echo reviewed.  Mildly reduced ejection fraction.  Moderate circumferential pericardial effusion noted.  Clinically not in tamponade.  will repeat another echo in 48 hours to reassess the effusion.  Avoiding amiodarone given thyroid dysfunction..    Jacqueline Santos MD  Department of Cardiology  [facility]  12/28/2024

## 2024-12-28 NOTE — ASSESSMENT & PLAN NOTE
The patient's most recent sodium results are listed below.  Recent Labs     12/27/24  1600   *     Plan  - Correct the sodium by 4-6mEq in 24 hours.   - Obtain the following studies: Urine sodium, urine osmolality, serum osmolality, AM cortisol, or TSH, T4.  - Will hold on fluids for now given patient appears slightly fluid overloaded.  - Monitor sodium Daily.   - Patient hyponatremia is stable  - Nephrology consulted, appreciate recs

## 2024-12-28 NOTE — ASSESSMENT & PLAN NOTE
Latest blood pressure and vitals reviewed-   Temp:  [98.1 °F (36.7 °C)]   Pulse:  [118-122]   Resp:  [18-20]   BP: (103-143)/(74-97)   SpO2:  [95 %-97 %] .     Home meds for hypertension were reviewed and noted below.   Hypertension Medications               furosemide (LASIX) 20 MG tablet Take 1 tablet every day by oral route as needed.    hydroCHLOROthiazide (MICROZIDE) 12.5 mg capsule Take 12.5 mg by mouth.    metoprolol tartrate (LOPRESSOR) 50 MG tablet Take 1 tablet by mouth 2 (two) times daily.          - Holding home lasix and HCTZ in the setting of hyponatremia  - Continue home metoprolol with parameters

## 2024-12-28 NOTE — ASSESSMENT & PLAN NOTE
Body mass index is 30.9 kg/m². Morbid obesity complicates all aspects of disease management from diagnostic modalities to treatment.

## 2024-12-28 NOTE — ASSESSMENT & PLAN NOTE
Patient has paroxysmal (<7 days) atrial fibrillation. Patient is currently in atrial fibrillation. [id number] Score: 4. The patients heart rate in the last 24 hours is as follows:  Pulse  Min: 118  Max: 122     Antiarrhythmics  diltiaZEM 100 mg in dextrose 5% 100 mL IVPB (ready to mix) (titrating), Continuous, Intravenous    Anticoagulants  apixaban tablet 5 mg, 2 times daily, Oral    Plan  - Replete lytes with a goal of K>4, Mg >2  - Patient is anticoagulated, see medications listed above.  - Patient's afib is currently uncontrolled. Will continue current treatment  - Underwent cardioversion on 11/26/2024 with conversion to NSR, was sent home on amio 400 mg BID for 10 days then 200 mg daily afterwards, says she has been compliant. Was also told to resume Toprol and eliquis.  - Initiated on diltiazem gtt in the ED  - Cardiology consulted, appreciate recs  - NPO at midnight

## 2024-12-28 NOTE — CONSULTS
[facility]  Adult Nutrition  Education Short Note      Nutrition Education    Previous education: yes    Diet at home: low sodium    Written information provided and explained on  fluid restriction: 1500 mL and 2 gram sodium diet diet.     Discussed with: patient and grandaughter    Educational Need? yes    Barriers: none identified    Interventions: Fluid modified diet and Sodium modified diet    Patient and/or family comprehend instructions: yes    Outcome: Verbalizes understanding     Thanks for the consult!    Charlee Fontaine RD 12/28/2024 4:14 PM

## 2024-12-28 NOTE — CONSULTS
Consult Note  Nephrology    Consult Requested By: Ani Moss, *    Reason for Consult:   hyponatremia    SUBJECTIVE:     History of Present Illness:   85 y/o female patient presented to the ED with a chief complaint of palpitations, weakness, fatigue, cough, SOB, LE edema.  ED workup revealed sodium of 124, chloride of 93, BNP of 156. CXR with cardiomegaly. Scattered linear and ground-glass opacities in the mid lower lung zones. Blunting of the costophrenic angles compatible mall components of pleural fluid.  , pt was put on cardizem gtt.  Nephrology is consulted for hyponatremia.    12/28  Na+ the same, of note, pt on HCTZ at home.  Getting lasix only here.  BUN/Scr wnl.  TSH high.  ECHO done and report pending.  Na+ levels usually a little low w/baseline being around 134.  Breath sounds w/rhonchi and wheezing throughout, +LE edema.  denies SOB.      Assessment/plan:    Hyponatremia, A/C  HTN  CHF  Afib w/RVR    --continue to hold HCTZ, considering chronic hyponatremia, would consider d/c this completely--ok for loop diuretics.  Serum osm 266.  Ordered ua w/micor, urine Na+/osm.  --avoid hypotension.  Keep MAP >55  --continue lasix for now  --on dilitazem gtt, cards to see    PMHx:  Afib w/RVR, cardioversion 11/26/2024, asthma, CHF, HTN, hypothyroidism, obesity, CVA, HLD, PE, tremors     No past medical history on file.  No past surgical history on file.  No family history on file.       Review of patient's allergies indicates:  No Known Allergies     Review of Systems:  As above    OBJECTIVE:     Vital Signs Range (Last 24H):  Temp:  [97.8 °F (36.6 °C)-98.1 °F (36.7 °C)]   Pulse:  []   Resp:  [18-28]   BP: ()/(63-97)   SpO2:  [91 %-97 %]     Physical Exam:  General- NAD noted  HEENT- WNL  Neck- supple  CV- Regular rate and rhythm  Resp- +wheezing/rhonchi.  No increased WOB  GI- Non tender/non-distended, BS normoactive x4 quads  Extrem- No cyanosis, clubbing, +edema.  Derm- skin  w/d  Neuro-  No flap.     Body mass index is 27.89 kg/m².    Laboratory:  CBC:   Recent Labs   Lab 12/28/24  0345   WBC 8.82   RBC 4.19   HGB 12.3   HCT 36.4*      MCV 87   MCH 29.4   MCHC 33.8     CMP:   Recent Labs   Lab 12/27/24  1600 12/27/24  2315 12/28/24  0345      < > 94   CALCIUM 9.1   < > 8.7   ALBUMIN 3.5  --   --    PROT 6.7  --   --    *   < > 128*   K 4.3   < > 3.9   CO2 26   < > 25   CL 93*   < > 97   BUN 13   < > 10   CREATININE 0.6   < > 0.5   ALKPHOS 121  --   --    ALT 24  --   --    AST 35  --   --    BILITOT 0.6  --   --     < > = values in this interval not displayed.       Diagnostic Results:  Labs: Reviewed      ASSESSMENT/PLAN:     Active Hospital Problems    Diagnosis  POA    *Atrial fibrillation with RVR [I48.91]  Yes    Hyponatremia [E87.1]  Yes    Congestive heart failure [I50.9]  Yes    Asthma [J45.909]  Yes    Hypertension, essential, benign [I10]  Yes    Hypothyroidism [E03.9]  Yes    Obesity (BMI 30-39.9) [E66.9]  Yes      Resolved Hospital Problems   No resolved problems to display.         Thank you for allowing us to participate in the care of your patient. We will follow the patient and provide recommendations as needed.      Time spent seeing patient( greater than 1/2 spent in direct contact) :     Disha Hernandez NP

## 2024-12-28 NOTE — SUBJECTIVE & OBJECTIVE
Interval History:  Patient is seen and examined at bedside this morning.  AFib in RVR, heart rate 120s was on diltiazem drip.  Cardiology consulted.    Sodium 128, held hydrochlorothiazide, nephrology following.    Gave low-dose IV Lasix for CHF which is likely triggering Afib?    TSH elevated, free T4 normal, could have underlying hypothyroidism causing CHF/hyponatremia.She will need repeat TFTs and Synthroid dose adjusting once heart rates improve.        Review of Systems  Objective:     Vital Signs (Most Recent):  Temp: 96.7 °F (35.9 °C) (12/28/24 1142)  Pulse: 99 (12/28/24 1430)  Resp: (!) 22 (12/28/24 1430)  BP: (!) 100/57 (12/28/24 1430)  SpO2: 95 % (12/28/24 1430) Vital Signs (24h Range):  Temp:  [96.7 °F (35.9 °C)-98.1 °F (36.7 °C)] 96.7 °F (35.9 °C)  Pulse:  [] 99  Resp:  [18-28] 22  SpO2:  [91 %-97 %] 95 %  BP: ()/(57-97) 100/57     Weight: 73.7 kg (162 lb 7.7 oz)  Body mass index is 27.89 kg/m².    Intake/Output Summary (Last 24 hours) at 12/28/2024 1457  Last data filed at 12/28/2024 1337  Gross per 24 hour   Intake 496.79 ml   Output 1000 ml   Net -503.21 ml         Physical Exam  Vitals reviewed.   Constitutional:       General: She is awake. She is not in acute distress.     Appearance: Normal appearance. She is not ill-appearing or diaphoretic.   Cardiovascular:      Rate and Rhythm: Tachycardia present. Rhythm irregularly irregular.      Heart sounds: Normal heart sounds.      No friction rub. No gallop.      Comments: Irregularly irregular rhythm noted on monitor during interview and exam.  Pulmonary:      Effort: Pulmonary effort is normal. No accessory muscle usage or respiratory distress.      Breath sounds: Wheezing (Diffuse expiratory) and rales (bibasilar) present.   Abdominal:      General: Abdomen is flat. Bowel sounds are normal.      Palpations: Abdomen is soft.      Tenderness: There is no abdominal tenderness. There is no guarding or rebound.   Musculoskeletal:      Right  lower le+ Pitting Edema present.      Left lower le+ Pitting Edema present.   Skin:     General: Skin is warm and dry.   Neurological:      Mental Status: She is alert and oriented to person, place, and time.   Psychiatric:         Behavior: Behavior is cooperative.             Significant Labs: All pertinent labs within the past 24 hours have been reviewed.    Significant Imaging: I have reviewed all pertinent imaging results/findings within the past 24 hours.

## 2024-12-28 NOTE — HOSPITAL COURSE
Patient was admitted for atrial fibrillation in RVR and had hyponatremia.  Patient was started on diltiazem drip.  Cardiology was consulted.  Cardioversion planned for 12/29.  Electrolytes repleted.  For hyponatremia, HCTZ was stopped. Lasix was given with caution for CHF.  Nephrology closely followed the patient.  At presentation, TSH was elevated but free T4 was normal, held off increasing Synthroid given active AFib with heart rates 130-140.  2D echocardiogram showed moderate pericardial effusion, but clinically she was not in tamponade.  Successful cardioversion to sinus rhythm with 2 shocks of 200 joules.  Diltiazem drip stopped and patient was started on low-dose beta blocker given EF reduced to 40-45% on 2D echocardiogram.  Sodium level improved, she responded well to IV Lasix. HR stable with beta blocker. Repeat ECHO ordered to monitor for pericardial effusion, which showed significant improvement and now only has trivial effusion.    Patient went back into RVR tried metoprolol and digoxin and ultimately needed amiodarone but patient able to be weaned off drip now.  Amiodarone and Cardizem stopped by Cardiology, increasing digoxin and metoprolol on an attempt to keep her rate controlled with p.o. medications.  Long discussion with patient today and since she has been asymptomatic this whole time and we are not able to control her heart rate except for on amiodarone GTT, may need ablation but a bad candidate.  Patient is agreeable to go home with palliative care and home hospice consult and Cardiology agrees as patient feels like we are and I have able to do much for her which is not wrong.  Plan to DC with home palliative care and hospice consult tomorrow.

## 2024-12-28 NOTE — NURSING
Left message for [facility] to [facility].  Patient

## 2024-12-28 NOTE — ASSESSMENT & PLAN NOTE
Patient has  hx of  heart failure that is Acute on chronic. On presentation their CHF was decompensated. Evidence of decompensated CHF on presentation includes: edema, dyspnea on exertion (FROST), and shortness of breath. Most recent BNP and echo results are listed below.  Recent Labs     12/27/24  1600   *     Latest ECHO  No results found for this or any previous visit.    Current Heart Failure Medications       Plan  - Monitor strict I&Os and daily weights.    - Place on telemetry  - Low sodium diet  - Place on fluid restriction of 1.5 L.   - Cardiology has been consulted  - The patient's volume status is stable but not at their baseline as indicated by edema, crackles on lung auscultation, dyspnea on exertion (FROST), and shortness of breath  - I suspect the pt being in Afib is also causing some component of CHF exacerbation. However, on review of pt's last echo, revealed normal EF and undetermined diastolic function.  - Cardiology consulted, appreciate recs  - NPO at midnight  - Will hold off on lasix at this time as patient is hyponatremic

## 2024-12-29 ENCOUNTER — ANESTHESIA (OUTPATIENT)
Dept: LONG TERM CARE ACUTE | Facility: HOSPITAL | Age: 84
End: 2024-12-29
Payer: MEDICARE

## 2024-12-29 ENCOUNTER — ANESTHESIA EVENT (OUTPATIENT)
Dept: LONG TERM CARE ACUTE | Facility: HOSPITAL | Age: 84
End: 2024-12-29
Payer: MEDICARE

## 2024-12-29 ENCOUNTER — ANESTHESIA (OUTPATIENT)
Dept: CARDIOLOGY | Facility: HOSPITAL | Age: 84
End: 2024-12-29
Payer: MEDICARE

## 2024-12-29 ENCOUNTER — ANESTHESIA EVENT (OUTPATIENT)
Dept: CARDIOLOGY | Facility: HOSPITAL | Age: 84
End: 2024-12-29
Payer: MEDICARE

## 2024-12-29 VITALS
TEMPERATURE: 99 F | RESPIRATION RATE: 14 BRPM | SYSTOLIC BLOOD PRESSURE: 92 MMHG | DIASTOLIC BLOOD PRESSURE: 60 MMHG | HEART RATE: 78 BPM | OXYGEN SATURATION: 88 %

## 2024-12-29 LAB
ANION GAP SERPL CALC-SCNC: 5 MMOL/L (ref 8–16)
BASOPHILS # BLD AUTO: 0.09 K/UL (ref 0–0.2)
BASOPHILS NFR BLD: 1.2 % (ref 0–1.9)
BUN SERPL-MCNC: 14 MG/DL (ref 8–23)
CALCIUM SERPL-MCNC: 8.8 MG/DL (ref 8.7–10.5)
CHLORIDE SERPL-SCNC: 99 MMOL/L (ref 95–110)
CO2 SERPL-SCNC: 28 MMOL/L (ref 23–29)
CREAT SERPL-MCNC: 0.5 MG/DL (ref 0.5–1.4)
DIFFERENTIAL METHOD BLD: ABNORMAL
EOSINOPHIL # BLD AUTO: 0.4 K/UL (ref 0–0.5)
EOSINOPHIL NFR BLD: 5.2 % (ref 0–8)
ERYTHROCYTE [DISTWIDTH] IN BLOOD BY AUTOMATED COUNT: 15.6 % (ref 11.5–14.5)
EST. GFR  (NO RACE VARIABLE): >60 ML/MIN/1.73 M^2
GLUCOSE SERPL-MCNC: 98 MG/DL (ref 70–110)
HCT VFR BLD AUTO: 35.3 % (ref 37–48.5)
HGB BLD-MCNC: 11.8 G/DL (ref 12–16)
IMM GRANULOCYTES # BLD AUTO: 0.03 K/UL (ref 0–0.04)
IMM GRANULOCYTES NFR BLD AUTO: 0.4 % (ref 0–0.5)
LYMPHOCYTES # BLD AUTO: 1.2 K/UL (ref 1–4.8)
LYMPHOCYTES NFR BLD: 16.5 % (ref 18–48)
MAGNESIUM SERPL-MCNC: 2 MG/DL (ref 1.6–2.6)
MCH RBC QN AUTO: 28.9 PG (ref 27–31)
MCHC RBC AUTO-ENTMCNC: 33.4 G/DL (ref 32–36)
MCV RBC AUTO: 87 FL (ref 82–98)
MONOCYTES # BLD AUTO: 0.7 K/UL (ref 0.3–1)
MONOCYTES NFR BLD: 9.9 % (ref 4–15)
NEUTROPHILS # BLD AUTO: 5 K/UL (ref 1.8–7.7)
NEUTROPHILS NFR BLD: 66.8 % (ref 38–73)
NRBC BLD-RTO: 0 /100 WBC
PHOSPHATE SERPL-MCNC: 2.7 MG/DL (ref 2.7–4.5)
PLATELET # BLD AUTO: 352 K/UL (ref 150–450)
PMV BLD AUTO: 9 FL (ref 9.2–12.9)
POTASSIUM SERPL-SCNC: 4 MMOL/L (ref 3.5–5.1)
RBC # BLD AUTO: 4.08 M/UL (ref 4–5.4)
SODIUM SERPL-SCNC: 132 MMOL/L (ref 136–145)
WBC # BLD AUTO: 7.46 K/UL (ref 3.9–12.7)

## 2024-12-29 PROCEDURE — D9220A PRA ANESTHESIA: Mod: ANES,,, | Performed by: STUDENT IN AN ORGANIZED HEALTH CARE EDUCATION/TRAINING PROGRAM

## 2024-12-29 PROCEDURE — 27000221 HC OXYGEN, UP TO 24 HOURS

## 2024-12-29 PROCEDURE — 25000003 PHARM REV CODE 250: Performed by: NURSE PRACTITIONER

## 2024-12-29 PROCEDURE — 93010 ELECTROCARDIOGRAM REPORT: CPT | Mod: ,,, | Performed by: INTERNAL MEDICINE

## 2024-12-29 PROCEDURE — 84100 ASSAY OF PHOSPHORUS: CPT

## 2024-12-29 PROCEDURE — D9220A PRA ANESTHESIA: Mod: CRNA,,, | Performed by: NURSE ANESTHETIST, CERTIFIED REGISTERED

## 2024-12-29 PROCEDURE — 93005 ELECTROCARDIOGRAM TRACING: CPT | Performed by: INTERNAL MEDICINE

## 2024-12-29 PROCEDURE — 63600175 PHARM REV CODE 636 W HCPCS

## 2024-12-29 PROCEDURE — 85025 COMPLETE CBC W/AUTO DIFF WBC: CPT

## 2024-12-29 PROCEDURE — 21000000 HC CCU ICU ROOM CHARGE

## 2024-12-29 PROCEDURE — 92960 CARDIOVERSION ELECTRIC EXT: CPT | Mod: ,,, | Performed by: INTERNAL MEDICINE

## 2024-12-29 PROCEDURE — 80048 BASIC METABOLIC PNL TOTAL CA: CPT

## 2024-12-29 PROCEDURE — 5A2204Z RESTORATION OF CARDIAC RHYTHM, SINGLE: ICD-10-PCS | Performed by: INTERNAL MEDICINE

## 2024-12-29 PROCEDURE — 37000009 HC ANESTHESIA EA ADD 15 MINS

## 2024-12-29 PROCEDURE — 25000003 PHARM REV CODE 250: Performed by: EMERGENCY MEDICINE

## 2024-12-29 PROCEDURE — 83735 ASSAY OF MAGNESIUM: CPT

## 2024-12-29 PROCEDURE — 25000003 PHARM REV CODE 250

## 2024-12-29 PROCEDURE — 63600175 PHARM REV CODE 636 W HCPCS: Performed by: NURSE ANESTHETIST, CERTIFIED REGISTERED

## 2024-12-29 PROCEDURE — 99900035 HC TECH TIME PER 15 MIN (STAT)

## 2024-12-29 PROCEDURE — 37000008 HC ANESTHESIA 1ST 15 MINUTES

## 2024-12-29 PROCEDURE — 99900031 HC PATIENT EDUCATION (STAT)

## 2024-12-29 RX ORDER — PROPOFOL 10 MG/ML
INJECTION, EMULSION INTRAVENOUS
Status: DISCONTINUED | OUTPATIENT
Start: 2024-12-29 | End: 2024-12-29

## 2024-12-29 RX ORDER — EPINEPHRINE 0.1 MG/ML
INJECTION INTRAVENOUS
Status: DISPENSED
Start: 2024-12-29 | End: 2024-12-29

## 2024-12-29 RX ORDER — PREDNISONE 20 MG/1
20 TABLET ORAL DAILY
Status: DISCONTINUED | OUTPATIENT
Start: 2024-12-29 | End: 2025-01-05

## 2024-12-29 RX ORDER — ATROPINE SULFATE 0.1 MG/ML
INJECTION INTRAVENOUS
Status: DISPENSED
Start: 2024-12-29 | End: 2024-12-29

## 2024-12-29 RX ORDER — LIDOCAINE HYDROCHLORIDE 20 MG/ML
INJECTION INTRAVENOUS
Status: DISCONTINUED | OUTPATIENT
Start: 2024-12-29 | End: 2024-12-29

## 2024-12-29 RX ORDER — METOPROLOL TARTRATE 25 MG/1
12.5 TABLET ORAL 2 TIMES DAILY
Status: DISCONTINUED | OUTPATIENT
Start: 2024-12-29 | End: 2025-01-01

## 2024-12-29 RX ADMIN — METOPROLOL TARTRATE 12.5 MG: 25 TABLET, FILM COATED ORAL at 08:12

## 2024-12-29 RX ADMIN — PRAVASTATIN SODIUM 10 MG: 10 TABLET ORAL at 08:12

## 2024-12-29 RX ADMIN — PREGABALIN 100 MG: 75 CAPSULE ORAL at 08:12

## 2024-12-29 RX ADMIN — FUROSEMIDE 20 MG: 10 INJECTION, SOLUTION INTRAMUSCULAR; INTRAVENOUS at 08:12

## 2024-12-29 RX ADMIN — PROPOFOL 70 MG: 10 INJECTION, EMULSION INTRAVENOUS at 11:12

## 2024-12-29 RX ADMIN — DEXTROSE MONOHYDRATE 15 MG/HR: 50 INJECTION, SOLUTION INTRAVENOUS at 05:12

## 2024-12-29 RX ADMIN — APIXABAN 5 MG: 5 TABLET, FILM COATED ORAL at 08:12

## 2024-12-29 RX ADMIN — METOPROLOL TARTRATE 12.5 MG: 25 TABLET, FILM COATED ORAL at 11:12

## 2024-12-29 RX ADMIN — PREDNISONE 20 MG: 20 TABLET ORAL at 11:12

## 2024-12-29 RX ADMIN — LEVOTHYROXINE SODIUM 112 MCG: 112 TABLET ORAL at 05:12

## 2024-12-29 RX ADMIN — LIDOCAINE HYDROCHLORIDE 75 MG: 20 INJECTION, SOLUTION INTRAVENOUS at 11:12

## 2024-12-29 NOTE — PROCEDURES
Procedure performed-synchronized direct current cardioversion    Pre cardioversion rhythm-atrial fibrillation with RVR    Procedure:  Informed consent was obtained.  Sedation was performed by anesthesia.  Synchronized cardioversion was successfully performed.  Patient was successfully cardioverted to sinus rhythm after 2 shocks. (200 J followed by 200 J).    Post cardioversion rhythm- sinus rhythm    Complications: None    Recommendations: Continue anticoagulation.  Start low-dose beta-blocker.  Discontinue Cardizem drip.

## 2024-12-29 NOTE — PLAN OF CARE
Medicare Outpatient Observation Notice  Medicare Outpatient and Observation Notification regarding financial responsibility: (P) Given to patient/caregiver, Explained to patient/caregiver, Other (comments) ( spoke with family over the phone)     Date BECK was signed: (P) 12/29/24  Time BECK was signed: (P) 0925 12/29/24 1002   BECK Message   Medicare Outpatient and Observation Notification regarding financial responsibility Given to patient/caregiver;Explained to patient/caregiver;Other (comments)  ( spoke with family over the phone)   Date BECK was signed 12/29/24   Time BECK was signed 0925

## 2024-12-29 NOTE — RESPIRATORY THERAPY
12/28/24 2056   Patient Assessment/Suction   Level of Consciousness (AVPU) alert   Respiratory Effort Normal;Unlabored   Expansion/Accessory Muscles/Retractions no retractions;no use of accessory muscles   All Lung Fields Breath Sounds wheezes, expiratory   Rhythm/Pattern, Respiratory unlabored   Cough Frequency infrequent   Cough Type nonproductive   PRE-TX-O2   Device (Oxygen Therapy) nasal cannula   Flow (L/min) (Oxygen Therapy) 2   SpO2 (!) 92 %   Pulse Oximetry Type Continuous   $ Pulse Oximetry - Multiple Charge Pulse Oximetry - Multiple   Pulse (!) 120   Resp 16   /73   Aerosol Therapy   $ Aerosol Therapy Charges Aerosol Treatment   Daily Review of Necessity (SVN) completed   Respiratory Treatment Status (SVN) given   Treatment Route (SVN) mask;oxygen   Patient Position sitting in chair   Post Treatment Assessment (SVN) increased aeration   Signs of Intolerance (SVN) none   Education   $ Education Bronchodilator;Oxygen;15 min

## 2024-12-29 NOTE — ANESTHESIA PREPROCEDURE EVALUATION
12/29/2024  Niurka Patel is a 84 y.o., female.      Patient Active Problem List   Diagnosis    Atrial fibrillation with RVR    Asthma    Cerebrovascular accident (CVA)    Congestive heart failure    Hyperlipidemia    Hypertension, essential, benign    Hypothyroidism    Obesity (BMI 30-39.9)    Pulmonary embolism    Hyponatremia       No past surgical history on file.     Tobacco Use:  The patient  has no history on file for tobacco use.     Results for orders placed or performed during the hospital encounter of 12/27/24   EKG 12-LEAD    Collection Time: 12/28/24  3:25 PM   Result Value Ref Range    QRS Duration 102 ms    OHS QTC Calculation 464 ms    Narrative    Test Reason : I48.91,I48.92,    Vent. Rate : 101 BPM     Atrial Rate :    BPM     P-R Int :    ms          QRS Dur : 102 ms      QT Int : 358 ms       P-R-T Axes :    118  71 degrees    QTcB Int : 464 ms    Atrial fibrillation with rapid ventricular response  Right axis deviation  Incomplete right bundle branch block  Possible Right ventricular hypertrophy  Nonspecific T wave abnormality  Abnormal ECG  No previous ECGs available    Referred By: AAAREFERRAL SELF           Confirmed By:         Imaging Results              X-Ray Chest AP Portable (Final result)  Result time 12/27/24 16:22:14      Final result by Yaquelin Jacobs IV, MD (12/27/24 16:22:14)                   Impression:      Suboptimal positioning.    Cardiomegaly.    Scattered linear and ground-glass opacities in the mid lower lung zones.    Blunting of the costophrenic angles compatible small components of pleural fluid.    Questionable nodular opacity in the right upper lobe near the apex.  Follow-up plain radiographs are recommended to determine at this represents a persistent finding.  If such, CT would be necessary for further investigation.      Electronically signed by: Yaquelin  Arlene  Date:    12/27/2024  Time:    16:22               Narrative:    EXAMINATION:  XR CHEST AP PORTABLE    CLINICAL HISTORY:  CHF;    COMPARISON:  None.    FINDINGS:  The patient is significantly rotated.    The heart is enlarged.  The central pulmonary vasculature does not appear acutely engorged.  There is tortuosity of the aorta and branches.    Linear parenchymal opacities are observed within the left mid and lower lung zone and in the right lung base.  Additionally, there are mild ground-glass opacities in the right mid lung zone and bilateral lower lung zones.  Blunting of the costophrenic angles bilaterally suggest small components of pleural fluid.    There is a nodular appearing opacity within the left pulmonary apex.  Follow-up radiographs are recommended to determine if this is a persistent abnormality.                                       Lab Results   Component Value Date    WBC 7.46 12/29/2024    HGB 11.8 (L) 12/29/2024    HCT 35.3 (L) 12/29/2024    MCV 87 12/29/2024     12/29/2024     BMP  Lab Results   Component Value Date     (L) 12/29/2024    K 4.0 12/29/2024    CL 99 12/29/2024    CO2 28 12/29/2024    BUN 14 12/29/2024    CREATININE 0.5 12/29/2024    CALCIUM 8.8 12/29/2024    ANIONGAP 5 (L) 12/29/2024    GLU 98 12/29/2024     12/28/2024    GLU 94 12/28/2024       Results for orders placed during the hospital encounter of 12/27/24    Echo    Interpretation Summary    Left Ventricle: The left ventricle is normal in size. There is concentric remodeling. Global hypokinesis present. There is mildly reduced systolic function with a visually estimated ejection fraction of 40 - 45%. Unable to assess diastolic function due to atrial fibrillation.    Right Ventricle: Normal right ventricular cavity size. Systolic function is moderately reduced.    Left Atrium: Left atrium is moderately dilated.    Right Atrium: Right atrium is moderately dilated.    Mitral Valve: There is moderate  regurgitation.    Tricuspid Valve: There is mild regurgitation.    Pulmonic Valve: There is mild regurgitation.    Pericardium: There is a moderate circumferential effusion. No definitive evidence of tamponade on this study however difficult to assess due to tachycardia and irregular rhythm.  Correlate clinically.  Consider repeating echocardiogram in 1-2 days for reassessment of effusion.          Pre-op Assessment    I have reviewed the Patient Summary Reports.     I have reviewed the Nursing Notes. I have reviewed the NPO Status.   I have reviewed the Medications.     Review of Systems  Anesthesia Hx:  No problems with previous Anesthesia             Denies Family Hx of Anesthesia complications.    Denies Personal Hx of Anesthesia complications.                    Hematology/Oncology:  Hematology Normal   Oncology Normal                                   EENT/Dental:  EENT/Dental Normal           Cardiovascular:     Hypertension    Dysrhythmias atrial fibrillation  CHF       ECG has been reviewed.         Congestive Heart Failure (CHF)                Hypertension     Atrial Fibrillation     Pulmonary:    Asthma       Asthma:               Renal/:  Renal/ Normal                 Hepatic/GI:  Hepatic/GI Normal                    Musculoskeletal:  Musculoskeletal Normal                Neurological:   CVA                       CVA - Cerebrovasular Accident                 Endocrine:   Hypothyroidism       Hypothyroidism          Psych:  Psychiatric Normal                    Physical Exam  General: Well nourished, Cooperative, Alert and Oriented    Airway:  Mallampati: II   Mouth Opening: Normal  TM Distance: Normal  Tongue: Normal  Neck ROM: Normal ROM    Dental:  Intact    Chest/Lungs:  Clear to auscultation    Heart:  Rate: Normal  Rhythm: Regular Rhythm  Sounds: Normal        Anesthesia Plan  Type of Anesthesia, risks & benefits discussed:    Anesthesia Type: Gen Natural Airway  Intra-op Monitoring Plan:  Standard ASA Monitors  Induction:  IV  Informed Consent: Informed consent signed with the Patient and all parties understand the risks and agree with anesthesia plan.  All questions answered.   ASA Score: 3 Emergent    Ready For Surgery From Anesthesia Perspective.     .

## 2024-12-29 NOTE — ANESTHESIA POSTPROCEDURE EVALUATION
Anesthesia Post Evaluation    Patient: Niurka Amanda    Procedure(s) Performed: Procedure(s) (LRB):  Cardioversion or Defibrillation (N/A)    Final Anesthesia Type: general      Patient location during evaluation: telemetry step down  Patient participation: Yes- Able to Participate  Level of consciousness: awake and alert  Post-procedure vital signs: reviewed and stable  Pain management: adequate  Airway patency: patent    PONV status at discharge: No PONV  Anesthetic complications: no      Cardiovascular status: hemodynamically stable  Respiratory status: unassisted, spontaneous ventilation and nasal cannula  Hydration status: euvolemic  Follow-up not needed.              Vitals Value Taken Time   /66 12/29/24 1201   Temp 36.5 °C (97.7 °F) 12/29/24 1135   Pulse 74 12/29/24 1230   Resp 26 12/29/24 1230   SpO2 99 % 12/29/24 1230   Vitals shown include unfiled device data.      No case tracking events are documented in the log.      Pain/Meliton Score: No data recorded

## 2024-12-29 NOTE — PLAN OF CARE
[facility]  Initial Discharge Assessment       Primary Care Provider: No, Primary Doctor    Admission Diagnosis: Atrial fibrillation with RVR [I48.91]    Admission Date: 12/27/2024  Expected Discharge Date: Assessment completed via phone  with pt's niece Gabriela   , and all information on FaceSheet confirmed, including demographics, PCP, pharmacy and insurance. She currently lives in a one story home.  Her PCP is Dr. Barron , and preferred pharmacy is [facility]. Per Pt's niece she is on Eliquis any HH/HD/Blood Thinners.  For transportation to appointments, she usually has her niece Gabriela provide transportation .  For transportation at discharge, Gabriela will provide transportation.  Plan for discharge is [facility] to Home Health [facility].  Case Management to continue to follow for discharge planning needs.            Payor: [employer] MEDICARE / Plan: [employer] MEDICARE PPO / Product Type: Medicare Advantage /     Extended Emergency Contact Information  Primary Emergency Contact: Gabriela Frances  Mobile Phone: [phone]  Relation: Relative  Secondary Emergency Contact: Celina Mariscal  Mobile Phone: [phone]  Relation: Daughter  Preferred language: English   needed? No    Discharge Plan A: Home Health, Home  Discharge Plan B: Home Health, Home      [id number] - [place], [place] - [address] [facility]  [address]  [place] [address]  Phone: [phone] Fax: [phone]      Initial Assessment (most recent)       Adult Discharge Assessment - 12/29/24 1004          Discharge Assessment    Assessment Type Discharge Planning Assessment     Confirmed/corrected address, phone number and insurance Yes     Confirmed Demographics Correct on Facesheet     Source of Information family     Reason For Admission Atrial Fibrillation     People in Home other relative(s)     Do you expect to return to your current living situation? Yes     Do you have  help at home or someone to help you manage your care at home? Yes     Who are your caregiver(s) and their phone number(s)? Gabriela Frances- [phone]     Prior to hospitilization cognitive status: Alert/Oriented     Current cognitive status: Alert/Oriented     Walking or Climbing Stairs Difficulty no     Dressing/Bathing Difficulty no     Do you have any problems with: --   Per pt she assist with pt's ADLs    Home Accessibility wheelchair accessible     Home Layout Able to live on 1st floor     Equipment Currently Used at Home wheelchair;cane, straight;shower chair     Readmission within 30 days? Yes     Patient currently being followed by outpatient case management? No     Do you currently have service(s) that help you manage your care at home? Yes     Name and Contact number of agency [facility]     Is the pt/caregiver preference to resume services with current agency Yes     Do you take prescription medications? Yes     Do you have prescription coverage? Yes     Coverage [employer]     Do you have any problems affording any of your prescribed medications? No     Is the patient taking medications as prescribed? yes     Who is going to help you get home at discharge? Gabriela Frances     How do you get to doctors appointments? family or friend will provide     Are you on dialysis? No     Do you take coumadin? --   Pt is taking Eliquis    Discharge Plan A Home Health;Home     Discharge Plan B Home Health;Home     DME Needed Upon Discharge  none     Discharge Plan discussed with: Caregiver     Name(s) and Number(s) Gabriela Timmonsrod [phone]        OTHER    Name(s) of People in Home Gabriela Frances [phone]

## 2024-12-29 NOTE — PROGRESS NOTES
Consult Note  Nephrology    Consult Requested By: Ani Moss, *    Reason for Consult:   hyponatremia    SUBJECTIVE:     History of Present Illness:   83 y/o female patient presented to the ED with a chief complaint of palpitations, weakness, fatigue, cough, SOB, LE edema.  ED workup revealed sodium of 124, chloride of 93, BNP of 156. CXR with cardiomegaly. Scattered linear and ground-glass opacities in the mid lower lung zones. Blunting of the costophrenic angles compatible mall components of pleural fluid.  , pt was put on cardizem gtt.  Nephrology is consulted for hyponatremia.    12/28  Na+ the same, of note, pt on HCTZ at home.  Getting lasix only here.  BUN/Scr wnl.  TSH high.  ECHO done and report pending.  Na+ levels usually a little low w/baseline being around 134.  Breath sounds w/rhonchi and wheezing throughout, +LE edema.  denies SOB.    12/29  Na+ improving, urine osm 223, urine Na+ 53 .  ECHO w/EF 40-45%.  UA w/no cast, but 4+ glucose.  Low sp gravity.  Lungs sound much better today.  No new complaints.    Assessment/plan:    Hyponatremia, A/C  HTN  CHF  Afib w/RVR    --continue to hold HCTZ, considering chronic hyponatremia, would consider d/c this completely--ok for loop diuretics.  Serum osm 266.  Ordered ua w/micor, urine Na+/osm.  --avoid hypotension.  Keep MAP >55  --continue lasix for now  --on dilitazem gtt, cards to see    PMHx:  Afib w/RVR, cardioversion 11/26/2024, asthma, CHF, HTN, hypothyroidism, obesity, CVA, HLD, PE, tremors     No past medical history on file.  No past surgical history on file.  No family history on file.       Review of patient's allergies indicates:  No Known Allergies     Review of Systems:  As above    OBJECTIVE:     Vital Signs Range (Last 24H):  Temp:  [96.7 °F (35.9 °C)-98.5 °F (36.9 °C)]   Pulse:  []   Resp:  [16-51]   BP: ()/(57-95)   SpO2:  [91 %-97 %]     Physical Exam:  General- NAD noted  HEENT- WNL  Neck- supple  CV- Regular  rate and rhythm  Resp- +wheezing/rhonchi.  No increased WOB  GI- Non tender/non-distended, BS normoactive x4 quads  Extrem- No cyanosis, clubbing, +edema.  Derm- skin w/d  Neuro-  No flap.     Body mass index is 26.83 kg/m².    Laboratory:  CBC:   Recent Labs   Lab 12/29/24  0400   WBC 7.46   RBC 4.08   HGB 11.8*   HCT 35.3*      MCV 87   MCH 28.9   MCHC 33.4     CMP:   Recent Labs   Lab 12/27/24  1600 12/27/24  2315 12/29/24  0400      < > 98   CALCIUM 9.1   < > 8.8   ALBUMIN 3.5  --   --    PROT 6.7  --   --    *   < > 132*   K 4.3   < > 4.0   CO2 26   < > 28   CL 93*   < > 99   BUN 13   < > 14   CREATININE 0.6   < > 0.5   ALKPHOS 121  --   --    ALT 24  --   --    AST 35  --   --    BILITOT 0.6  --   --     < > = values in this interval not displayed.       Diagnostic Results:  Labs: Reviewed      ASSESSMENT/PLAN:     Active Hospital Problems    Diagnosis  POA    *Atrial fibrillation with RVR [I48.91]  Yes    Hyponatremia [E87.1]  Yes    Congestive heart failure [I50.9]  Yes    Asthma [J45.909]  Yes    Hypertension, essential, benign [I10]  Yes    Hypothyroidism [E03.9]  Yes    Obesity (BMI 30-39.9) [E66.9]  Yes      Resolved Hospital Problems   No resolved problems to display.         Thank you for allowing us to participate in the care of your patient. We will follow the patient and provide recommendations as needed.      Time spent seeing patient( greater than 1/2 spent in direct contact) :     Disha Hernandez NP

## 2024-12-29 NOTE — SUBJECTIVE & OBJECTIVE
Interval History:  Patient is seen and examined at bedside this morning.  AFib in RVR, heart rate better controlled today.  NPO for cardioversion this afternoon.    Sodium improved today to 132    2D echocardiogram showed moderate pericardial effusion, unclear with her atrial fibrillation, clinically no tamponade physiology.    No chest pain, no palpitations, no dizziness, no shortness of breath, she mentioned today she feels much better compared to yesterday.  Review of Systems  Objective:     Vital Signs (Most Recent):  Temp: 97.6 °F (36.4 °C) (12/29/24 1530)  Pulse: 81 (12/29/24 1527)  Resp: (!) 22 (12/29/24 1527)  BP: 118/68 (12/29/24 1527)  SpO2: 97 % (12/29/24 1527) Vital Signs (24h Range):  Temp:  [97.6 °F (36.4 °C)-98.6 °F (37 °C)] 97.6 °F (36.4 °C)  Pulse:  [] 81  Resp:  [10-51] 22  SpO2:  [88 %-100 %] 97 %  BP: ()/(57-95) 118/68     Weight: 70.9 kg (156 lb 4.9 oz)  Body mass index is 26.83 kg/m².    Intake/Output Summary (Last 24 hours) at 12/29/2024 1539  Last data filed at 12/29/2024 1532  Gross per 24 hour   Intake 674.37 ml   Output 1900 ml   Net -1225.63 ml         Physical Exam  Vitals reviewed.   Constitutional:       General: She is awake. She is not in acute distress.     Appearance: Normal appearance. She is not ill-appearing or diaphoretic.   Cardiovascular:      Rate and Rhythm: Tachycardia present. Rhythm irregularly irregular.      Heart sounds: Normal heart sounds.      No friction rub. No gallop.      Comments: Irregularly irregular rhythm noted on monitor during interview and exam.  Pulmonary:      Effort: Pulmonary effort is normal. No accessory muscle usage or respiratory distress.      Breath sounds: Wheezing (Diffuse expiratory) and rales (bibasilar) present.      Comments: Improving  Abdominal:      General: Abdomen is flat. Bowel sounds are normal.      Palpations: Abdomen is soft.      Tenderness: There is no abdominal tenderness. There is no guarding or rebound.    Musculoskeletal:      Right lower le+ Pitting Edema present.      Left lower le+ Pitting Edema present.   Skin:     General: Skin is warm and dry.   Neurological:      Mental Status: She is alert and oriented to person, place, and time.   Psychiatric:         Behavior: Behavior is cooperative.             Significant Labs: All pertinent labs within the past 24 hours have been reviewed.    Significant Imaging: I have reviewed all pertinent imaging results/findings within the past 24 hours.   Darin

## 2024-12-29 NOTE — NURSING
MD, Anesthesia, ECHO tech, and RN present. RODNEY possible CV to be performed.   Time out @ 1118  1 shock @ 200 J at 1119  1 shock @ 200 J at 1121

## 2024-12-29 NOTE — NURSING
"Writer arrived to patient room due to telemetry alarm and bed alarm going off noted patient sitting on the side of the bed with telemetry electrodes off and PIV in patient hand.  Patient stated " I was trying to go in the living room at my house and realized that I had all these wires on me.  I just started pulling all them off."  Patient proceeded to say, " I remember that I'm in the hospital and sorry for pulling everything off.  My dream felt real to me."  Writer inform patient that It was ok, and will need to start another PIV.    "

## 2024-12-30 ENCOUNTER — CLINICAL SUPPORT (OUTPATIENT)
Dept: CARDIOLOGY | Facility: HOSPITAL | Age: 84
End: 2024-12-30
Payer: MEDICARE

## 2024-12-30 PROBLEM — I31.39 PERICARDIAL EFFUSION: Status: ACTIVE | Noted: 2024-12-30

## 2024-12-30 LAB
ANION GAP SERPL CALC-SCNC: 4 MMOL/L (ref 8–16)
ANION GAP SERPL CALC-SCNC: 5 MMOL/L (ref 8–16)
BASOPHILS # BLD AUTO: 0.07 K/UL (ref 0–0.2)
BASOPHILS NFR BLD: 0.6 % (ref 0–1.9)
BSA FOR ECHO PROCEDURE: 1.82 M2
BUN SERPL-MCNC: 18 MG/DL (ref 8–23)
BUN SERPL-MCNC: 18 MG/DL (ref 8–23)
CALCIUM SERPL-MCNC: 9.1 MG/DL (ref 8.7–10.5)
CALCIUM SERPL-MCNC: 9.5 MG/DL (ref 8.7–10.5)
CHLORIDE SERPL-SCNC: 96 MMOL/L (ref 95–110)
CHLORIDE SERPL-SCNC: 97 MMOL/L (ref 95–110)
CO2 SERPL-SCNC: 30 MMOL/L (ref 23–29)
CO2 SERPL-SCNC: 32 MMOL/L (ref 23–29)
CREAT SERPL-MCNC: 0.5 MG/DL (ref 0.5–1.4)
CREAT SERPL-MCNC: 0.5 MG/DL (ref 0.5–1.4)
DIFFERENTIAL METHOD BLD: ABNORMAL
EOSINOPHIL # BLD AUTO: 0.3 K/UL (ref 0–0.5)
EOSINOPHIL NFR BLD: 2.7 % (ref 0–8)
ERYTHROCYTE [DISTWIDTH] IN BLOOD BY AUTOMATED COUNT: 15.3 % (ref 11.5–14.5)
EST. GFR  (NO RACE VARIABLE): >60 ML/MIN/1.73 M^2
EST. GFR  (NO RACE VARIABLE): >60 ML/MIN/1.73 M^2
GLUCOSE SERPL-MCNC: 144 MG/DL (ref 70–110)
GLUCOSE SERPL-MCNC: 145 MG/DL (ref 70–110)
HCT VFR BLD AUTO: 40.8 % (ref 37–48.5)
HGB BLD-MCNC: 13.6 G/DL (ref 12–16)
IMM GRANULOCYTES # BLD AUTO: 0.03 K/UL (ref 0–0.04)
IMM GRANULOCYTES NFR BLD AUTO: 0.3 % (ref 0–0.5)
IVC DIAMETER: 2.1 CM
LYMPHOCYTES # BLD AUTO: 0.8 K/UL (ref 1–4.8)
LYMPHOCYTES NFR BLD: 7.5 % (ref 18–48)
MAGNESIUM SERPL-MCNC: 1.8 MG/DL (ref 1.6–2.6)
MCH RBC QN AUTO: 29 PG (ref 27–31)
MCHC RBC AUTO-ENTMCNC: 33.3 G/DL (ref 32–36)
MCV RBC AUTO: 87 FL (ref 82–98)
MONOCYTES # BLD AUTO: 0.6 K/UL (ref 0.3–1)
MONOCYTES NFR BLD: 5.3 % (ref 4–15)
NEUTROPHILS # BLD AUTO: 9.3 K/UL (ref 1.8–7.7)
NEUTROPHILS NFR BLD: 83.6 % (ref 38–73)
NRBC BLD-RTO: 0 /100 WBC
PHOSPHATE SERPL-MCNC: 2.8 MG/DL (ref 2.7–4.5)
PLATELET # BLD AUTO: 421 K/UL (ref 150–450)
PMV BLD AUTO: 8.7 FL (ref 9.2–12.9)
POTASSIUM SERPL-SCNC: 4.1 MMOL/L (ref 3.5–5.1)
POTASSIUM SERPL-SCNC: 4.2 MMOL/L (ref 3.5–5.1)
RA PRESSURE ESTIMATED: 3 MMHG
RBC # BLD AUTO: 4.69 M/UL (ref 4–5.4)
SODIUM SERPL-SCNC: 131 MMOL/L (ref 136–145)
SODIUM SERPL-SCNC: 133 MMOL/L (ref 136–145)
TRICUSPID ANNULAR PLANE SYSTOLIC EXCURSION: 1.56 CM
WBC # BLD AUTO: 11.17 K/UL (ref 3.9–12.7)

## 2024-12-30 PROCEDURE — 94761 N-INVAS EAR/PLS OXIMETRY MLT: CPT

## 2024-12-30 PROCEDURE — 63600175 PHARM REV CODE 636 W HCPCS

## 2024-12-30 PROCEDURE — 99233 SBSQ HOSP IP/OBS HIGH 50: CPT | Mod: ,,, | Performed by: INTERNAL MEDICINE

## 2024-12-30 PROCEDURE — 36415 COLL VENOUS BLD VENIPUNCTURE: CPT

## 2024-12-30 PROCEDURE — 21000000 HC CCU ICU ROOM CHARGE

## 2024-12-30 PROCEDURE — 27000221 HC OXYGEN, UP TO 24 HOURS

## 2024-12-30 PROCEDURE — 99900035 HC TECH TIME PER 15 MIN (STAT)

## 2024-12-30 PROCEDURE — 93308 TTE F-UP OR LMTD: CPT | Mod: 26,,, | Performed by: INTERNAL MEDICINE

## 2024-12-30 PROCEDURE — 83735 ASSAY OF MAGNESIUM: CPT

## 2024-12-30 PROCEDURE — 97530 THERAPEUTIC ACTIVITIES: CPT

## 2024-12-30 PROCEDURE — 80048 BASIC METABOLIC PNL TOTAL CA: CPT

## 2024-12-30 PROCEDURE — 93308 TTE F-UP OR LMTD: CPT

## 2024-12-30 PROCEDURE — 80048 BASIC METABOLIC PNL TOTAL CA: CPT | Mod: 91

## 2024-12-30 PROCEDURE — 85025 COMPLETE CBC W/AUTO DIFF WBC: CPT

## 2024-12-30 PROCEDURE — 25000003 PHARM REV CODE 250: Performed by: NURSE PRACTITIONER

## 2024-12-30 PROCEDURE — 97116 GAIT TRAINING THERAPY: CPT

## 2024-12-30 PROCEDURE — 84100 ASSAY OF PHOSPHORUS: CPT

## 2024-12-30 PROCEDURE — 25000003 PHARM REV CODE 250

## 2024-12-30 RX ORDER — FUROSEMIDE 20 MG/1
20 TABLET ORAL DAILY
Status: DISCONTINUED | OUTPATIENT
Start: 2024-12-31 | End: 2024-12-31

## 2024-12-30 RX ADMIN — METOPROLOL TARTRATE 12.5 MG: 25 TABLET, FILM COATED ORAL at 08:12

## 2024-12-30 RX ADMIN — APIXABAN 5 MG: 5 TABLET, FILM COATED ORAL at 08:12

## 2024-12-30 RX ADMIN — PREDNISONE 20 MG: 20 TABLET ORAL at 08:12

## 2024-12-30 RX ADMIN — FUROSEMIDE 20 MG: 10 INJECTION, SOLUTION INTRAMUSCULAR; INTRAVENOUS at 08:12

## 2024-12-30 RX ADMIN — PREGABALIN 100 MG: 75 CAPSULE ORAL at 08:12

## 2024-12-30 RX ADMIN — LEVOTHYROXINE SODIUM 112 MCG: 112 TABLET ORAL at 05:12

## 2024-12-30 RX ADMIN — PRAVASTATIN SODIUM 10 MG: 10 TABLET ORAL at 08:12

## 2024-12-30 RX ADMIN — ACETAMINOPHEN 650 MG: 325 TABLET ORAL at 01:12

## 2024-12-30 RX ADMIN — Medication 800 MG: at 04:12

## 2024-12-30 NOTE — ASSESSMENT & PLAN NOTE
Patient has paroxysmal (<7 days) atrial fibrillation. Patient is currently in atrial fibrillation. [id number] Score: 4. The patients heart rate in the last 24 hours is as follows:  Pulse  Min: 62  Max: 129     Antiarrhythmics  metoprolol tartrate (LOPRESSOR) split tablet 12.5 mg, 2 times daily, Oral    Anticoagulants  apixaban tablet 5 mg, 2 times daily, Oral    Plan  - Replete lytes with a goal of K>4, Mg >2  - Patient is anticoagulated, see medications listed above.  - Patient's afib is currently uncontrolled. Will continue current treatment  - Underwent cardioversion on 11/26/2024 with conversion to NSR, was sent home on amio 400 mg BID for 10 days then 200 mg daily afterwards, says she has been compliant. Was also told to resume Toprol and eliquis.  - Initiated on diltiazem gtt in the ED  - Cardiology consulted  - cardioversion 12/30, diltiazem drip stopped, low-dose beta blocker started given reduced EF

## 2024-12-30 NOTE — PLAN OF CARE
SW called [facility] to confirm KIMBERLEE. Pt is active and will need orders to be sent at discharge.      12/30/24 1738   Post-Acute Status   Post-Acute Authorization Placement   Post-Acute Placement Status Set-up Complete/Auth obtained   Discharge Delays Orders Needed   Discharge Plan   Discharge Plan A Home Health   Discharge Plan B [facility]

## 2024-12-30 NOTE — ASSESSMENT & PLAN NOTE
The patient's most recent sodium results are listed below.  Recent Labs     12/28/24  0345 12/28/24  1653 12/29/24  0400   * 130* 132*       Plan  - Correct the sodium by 4-6mEq in 24 hours.   - Urine sodium, urine osmolality, serum osmolality, AM cortisol, or TSH, T4.  - Will hold on fluids for now given patient appears slightly fluid overloaded.  - Monitor sodium Daily.   - Patient hyponatremia is stable  - Nephrology consulted, appreciate recs  - Hold HCTZ  - IV lasix for diuresis with caution  - TSH elevated, Free T4 normal - needs repeat labs with synthyroid  - Adjust dose after HR better controlled    Improved

## 2024-12-30 NOTE — PT/OT/SLP PROGRESS
Physical Therapy Treatment    Patient Name:  Niurka Patel   MRN:  [id number]    Recommendations:     Discharge Recommendations: Low Intensity Therapy  Discharge Equipment Recommendations: none  Barriers to discharge: Decreased caregiver support    Assessment:     Niurka Patel is a 84 y.o. female admitted with a medical diagnosis of Atrial fibrillation with RVR.  She presents with the following impairments/functional limitations: weakness, impaired endurance, gait instability, impaired functional mobility, impaired cardiopulmonary response to activity, edema Pt found awake in bed. Agreeable to PT. Additional time required for telemetry monitor secondary to connectivity issues. Pt sat EOB x 5 minutes with supervision. Performed SPT with RW to BSC. Noted LOB posteriorly requiring steadying assistance.After toileting, pt ambulated in hallway for a total of 350 ft , 2 standing rest periods on 2 l NC. Improved tolerance to activity this am. Pt  up to BS chair following ambulation.    Rehab Prognosis: Good; patient would benefit from acute skilled PT services to address these deficits and reach maximum level of function.    Recent Surgery: Procedure(s) (LRB):  Cardioversion or Defibrillation (N/A) 1 Day Post-Op    Plan:     During this hospitalization, patient to be seen 5 x/week to address the identified rehab impairments via gait training, therapeutic activities, therapeutic exercises and progress toward the following goals:    Plan of Care Expires:  01/28/25    Subjective     Chief Complaint: tired  Patient/Family Comments/goals: home  Pain/Comfort:  Pain Rating 1: 0/10  Pain Rating Post-Intervention 1: 0/10      Objective:     Communicated with nurse prior to session.  Patient found supine with peripheral IV, oxygen, telemetry upon PT entry to room.     General Precautions: Standard, fall  Orthopedic Precautions: N/A  Braces: N/A  Respiratory Status: Nasal cannula, flow 2 L/min     Functional Mobility:  Bed Mobility:      Supine to Sit: minimum assistance  Transfers:     Sit to Stand:  contact guard assistance with rolling walker  Toilet Transfer: contact guard assistance and minimum assistance with  rolling walker  using  Stand Pivot  Gait: 350 ft with RW, CGA, 2 standing rests.      AM-PAC 6 CLICK MOBILITY  Turning over in bed (including adjusting bedclothes, sheets and blankets)?: 4  Sitting down on and standing up from a chair with arms (e.g., wheelchair, bedside commode, etc.): 3  Moving from lying on back to sitting on the side of the bed?: 3  Moving to and from a bed to a chair (including a wheelchair)?: 3  Need to walk in hospital room?: 3  Climbing 3-5 steps with a railing?: 3  Basic Mobility Total Score: 19       Treatment & Education:  Educated in PT roles and goals, DC recommendations, fall prevention, use of call light, safety with RW for transfers and gait    Patient left up in chair with all lines intact, call button in reach, and nurse notified..    GOALS:   Multidisciplinary Problems       Physical Therapy Goals          Problem: Physical Therapy    Goal Priority Disciplines Outcome Interventions   Physical Therapy Goal     PT, PT/OT Progressing    Description: Goals to be met by: 25     Patient will increase functional independence with mobility by performin. Supine to sit with Modified [place]  2. Sit to supine with Modified [place]  3. Sit to stand transfer with Modified [place]  4. Bed to chair transfer with Modified [place] using Rolling Walker  5. Gait  x 150 feet with Modified [place] using Rolling Walker maintaining SPO2 above 90%.                          Time Tracking:     PT Received On: 24  PT Start Time: 1025     PT Stop Time: 1054  PT Total Time (min): 29 min     Billable Minutes: Gait Training 14 and Therapeutic Activity 15    Treatment Type: Treatment  PT/PTA: PT           2024

## 2024-12-30 NOTE — ASSESSMENT & PLAN NOTE
- moderate pericardial effusion on 2D echocardiogram  - should improve after cardioversion today  - repeat limited echo tomorrow  - Clinically not in tamponade physiology  - asymptomatic, vitals stable  - cardiology following  - caution with IV diuresis

## 2024-12-30 NOTE — ASSESSMENT & PLAN NOTE
Body mass index is 26.68 kg/m². Morbid obesity complicates all aspects of disease management from diagnostic modalities to treatment.

## 2024-12-30 NOTE — ASSESSMENT & PLAN NOTE
The patient's most recent sodium results are listed below.  Recent Labs     12/28/24  1653 12/29/24  0400 12/30/24  1130   * 132* 131*  133*       Plan  - Correct the sodium by 4-6mEq in 24 hours.   - Urine sodium, urine osmolality, serum osmolality, AM cortisol, or TSH, T4.  - Will hold on fluids for now given patient appears slightly fluid overloaded.  - Monitor sodium Daily.   - Patient hyponatremia is stable  - Nephrology consulted, appreciate recs  - Hold HCTZ  - IV lasix for diuresis with caution  - TSH elevated, Free T4 normal - needs repeat labs with synthyroid  - Adjust dose after HR better controlled    Improved

## 2024-12-30 NOTE — PLAN OF CARE
Repeat echo today     12/30/24 0500   Discharge Reassessment   Assessment Type Discharge Planning Reassessment   Did the patient's condition or plan change since previous assessment? No

## 2024-12-30 NOTE — PROGRESS NOTES
[facility] Medicine  Progress Note    Patient Name: Niurka Patel  MRN: [id number]  Patient Class: IP- Inpatient   Admission Date: 12/27/2024  Length of Stay: 2 days  Attending Physician: Ani Moss, *  Primary Care Provider: No, Primary Doctor        Subjective     Principal Problem:Atrial fibrillation with RVR        HPI:  Ms. Patel is an 84 yr old female with a hx of with RVR, asthma, CHF, HTN, hypothyroidism, obesity, CVA, HLD, PE, tremors who presented to the ED with a chief complaint of palpitations.  Upon talking to patient she actually denies ever feeling palpitations or any, fluttering in her chest.  She states that over the last 4 weeks she has had increased generalized weakness and fatigue.  She also endorses productive cough with white sputum, shortness of breath, and leg swelling.  She states that her home health nurse came to check on her today and noticed that she seemed very wobbly while using her cane and advised her to come to the emergency room for further evaluation.  Patient states that she was in AFib about 1 month ago and had cardioversion on 11/26/2024 with successful conversion to normal sinus rhythm.  Per chart review, she was discharged on amiodarone 400 mg b.i.d. for 10 days then 200 mg daily afterwards.  She was also told to continue taking her metoprolol and Eliquis.  She also endorses a history of CHF denies PND and orthopnea.  Denies any recent sick contacts or home oxygen use.  Endorses tobacco use and smokes 1 pack of cigarettes a day and denies alcohol and recreational drug use.  She denies fever, chills, palpitations, diaphoresis, chest pain, abdominal pain, nausea, vomiting, diarrhea, dysuria, hematuria, lightheadedness, dizziness, and syncope.    Upon arrival to ED, patient afebrile, HR of 122, RR of 18, BP of 103/74, satting 97% on RA.  Workup in the ED revealed sodium of 124, chloride of 93, BNP of 156.  CXR with cardiomegaly.  Scattered  linear and ground-glass opacities in the mid lower lung zones.  Blunting of the costophrenic angles compatible mall components of pleural fluid.  Questionable nodular opacity in the right upper lobe near the apex.  Follow-up plain radiographs are recommended.  Patient was given aspirin 325 mg oral and initiated on diltiazem titrating gtt while in the ED. case discussed with ED provider and patient will be placed under observation for further management.    Overview/Hospital Course:  Patient was admitted for atrial fibrillation in RVR and had hyponatremia.  Patient was started on diltiazem drip.  Cardiology was consulted.  Cardioversion planned for 12/29.  Electrolytes repleted.  For hyponatremia, HCTZ was stopped. Lasix was given with caution for CHF.  Nephrology closely followed the patient.  At presentation, TSH was elevated but free T4 was normal, held off increasing Synthroid given active AFib with heart rates 130-140.  2D echocardiogram showed moderate pericardial effusion, but clinically she was not in tamponade.  At pressures were stable on diltiazem drip.  Successful cardioversion to sinus rhythm with 2 shocks of 200 joules.  Diltiazem drip stopped and patient was started on low-dose beta blocker given EF reduced to 40-45% on 2D echocardiogram.  Sodium level improved, she responded well to IV Lasix.    Interval History:  Patient is seen and examined at bedside this morning.  AFib in RVR, heart rate better controlled today.  NPO for cardioversion this afternoon.    Sodium improved today to 132    2D echocardiogram showed moderate pericardial effusion, unclear with her atrial fibrillation, clinically no tamponade physiology.    No chest pain, no palpitations, no dizziness, no shortness of breath, she mentioned today she feels much better compared to yesterday.  Review of Systems  Objective:     Vital Signs (Most Recent):  Temp: 97.6 °F (36.4 °C) (12/29/24 1530)  Pulse: 81 (12/29/24 1527)  Resp: (!) 22  (24 1527)  BP: 118/68 (24 1527)  SpO2: 97 % (24 1527) Vital Signs (24h Range):  Temp:  [97.6 °F (36.4 °C)-98.6 °F (37 °C)] 97.6 °F (36.4 °C)  Pulse:  [] 81  Resp:  [10-51] 22  SpO2:  [88 %-100 %] 97 %  BP: ()/(57-95) 118/68     Weight: 70.9 kg (156 lb 4.9 oz)  Body mass index is 26.83 kg/m².    Intake/Output Summary (Last 24 hours) at 2024 1539  Last data filed at 2024 1532  Gross per 24 hour   Intake 674.37 ml   Output 1900 ml   Net -1225.63 ml         Physical Exam  Vitals reviewed.   Constitutional:       General: She is awake. She is not in acute distress.     Appearance: Normal appearance. She is not ill-appearing or diaphoretic.   Cardiovascular:      Rate and Rhythm: Tachycardia present. Rhythm irregularly irregular.      Heart sounds: Normal heart sounds.      No friction rub. No gallop.      Comments: Irregularly irregular rhythm noted on monitor during interview and exam.  Pulmonary:      Effort: Pulmonary effort is normal. No accessory muscle usage or respiratory distress.      Breath sounds: Wheezing (Diffuse expiratory) and rales (bibasilar) present.      Comments: Improving  Abdominal:      General: Abdomen is flat. Bowel sounds are normal.      Palpations: Abdomen is soft.      Tenderness: There is no abdominal tenderness. There is no guarding or rebound.   Musculoskeletal:      Right lower le+ Pitting Edema present.      Left lower le+ Pitting Edema present.   Skin:     General: Skin is warm and dry.   Neurological:      Mental Status: She is alert and oriented to person, place, and time.   Psychiatric:         Behavior: Behavior is cooperative.             Significant Labs: All pertinent labs within the past 24 hours have been reviewed.    Significant Imaging: I have reviewed all pertinent imaging results/findings within the past 24 hours.    Assessment and Plan     * Atrial fibrillation with RVR  Patient has paroxysmal (<7 days) atrial fibrillation.  Patient is currently in atrial fibrillation. [id number] Score: 4. The patients heart rate in the last 24 hours is as follows:  Pulse  Min: 62  Max: 129     Antiarrhythmics  metoprolol tartrate (LOPRESSOR) split tablet 12.5 mg, 2 times daily, Oral    Anticoagulants  apixaban tablet 5 mg, 2 times daily, Oral    Plan  - Replete lytes with a goal of K>4, Mg >2  - Patient is anticoagulated, see medications listed above.  - Patient's afib is currently uncontrolled. Will continue current treatment  - Underwent cardioversion on 11/26/2024 with conversion to NSR, was sent home on amio 400 mg BID for 10 days then 200 mg daily afterwards, says she has been compliant. Was also told to resume Toprol and eliquis.  - Initiated on diltiazem gtt in the ED  - Cardiology consulted  - cardioversion 12/30, diltiazem drip stopped, low-dose beta blocker started given reduced EF    Pericardial effusion  - moderate pericardial effusion on 2D echocardiogram  - should improve after cardioversion today  - repeat limited echo tomorrow  - Clinically not in tamponade physiology  - asymptomatic, vitals stable  - cardiology following  - caution with IV diuresis      Hyponatremia  The patient's most recent sodium results are listed below.  Recent Labs     12/28/24  0345 12/28/24  1653 12/29/24  0400   * 130* 132*       Plan  - Correct the sodium by 4-6mEq in 24 hours.   - Urine sodium, urine osmolality, serum osmolality, AM cortisol, or TSH, T4.  - Will hold on fluids for now given patient appears slightly fluid overloaded.  - Monitor sodium Daily.   - Patient hyponatremia is stable  - Nephrology consulted, appreciate recs  - Hold HCTZ  - IV lasix for diuresis with caution  - TSH elevated, Free T4 normal - needs repeat labs with synthyroid  - Adjust dose after HR better controlled    Improved        Obesity (BMI 30-39.9)  Body mass index is 27.89 kg/m². Morbid obesity complicates all aspects of disease management from diagnostic modalities to  treatment.        Hypothyroidism  - Continue home synthroid  - TSH elevated, Free T4 normal  - She will need repeat TFTs and Synthroid dose adjusting once heart rates improve.  - Repeat TSH couldn't be ordered        Hypertension, essential, benign  Latest blood pressure and vitals reviewed-   Temp:  [96.7 °F (35.9 °C)-98.1 °F (36.7 °C)]   Pulse:  []   Resp:  [18-28]   BP: ()/(57-97)   SpO2:  [91 %-97 %] .     Home meds for hypertension were reviewed and noted below.   Hypertension Medications               furosemide (LASIX) 20 MG tablet Take 1 tablet every day by oral route as needed.    hydroCHLOROthiazide (MICROZIDE) 12.5 mg capsule Take 12.5 mg by mouth.    metoprolol tartrate (LOPRESSOR) 50 MG tablet Take 1 tablet by mouth 2 (two) times daily.          - Holding home HCTZ in the setting of hyponatremia  - Continue home metoprolol with parameters  - Lasix with caution    Congestive heart failure  Patient has  hx of  heart failure that is Acute on chronic. On presentation their CHF was decompensated. Evidence of decompensated CHF on presentation includes: edema, dyspnea on exertion (FROST), and shortness of breath. Most recent BNP and echo results are listed below.      Recent Labs     12/27/24  1600   *       Latest ECHO  No results found for this or any previous visit.    Current Heart Failure Medications  furosemide injection 20 mg, Daily, Intravenous    Plan  - Monitor strict I&Os and daily weights.    - Place on telemetry  - Low sodium diet  - Place on fluid restriction of 1.5 L.   - Cardiology has been consulted  - The patient's volume status is stable but not at their baseline as indicated by edema, crackles on lung auscultation, dyspnea on exertion (FROST), and shortness of breath  - on review of pt's last echo, revealed normal EF and undetermined diastolic function.  - Cardiology consulted, appreciate recs  - IV Lasix with caution  - hold hydrochlorothiazide with  hyponatremia.        Asthma  - DuoNebs Q6H PRN  - Not in active exacerbation        VTE Risk Mitigation (From admission, onward)           Ordered     apixaban tablet 5 mg  2 times daily         12/27/24 1807     Reason for No Pharmacological VTE Prophylaxis  Once        Question:  Reasons:  Answer:  Already adequately anticoagulated on oral Anticoagulants    12/27/24 1748     IP VTE HIGH RISK PATIENT  Once         12/27/24 1748     Place sequential compression device  Until discontinued         12/27/24 1748                    Discharge Planning   CYNTHIA: 1/2/2025     Code Status: Full Code   Medical Readiness for Discharge Date:   Discharge Plan A: Home Health, Home                        Ani Moss MD  Department of Hospital Medicine   [facility]

## 2024-12-30 NOTE — PROGRESS NOTES
Progress Note  Nephrology    Consult Requested By: Ani Moss, *    Reason for Consult:   hyponatremia    SUBJECTIVE:     History of Present Illness:   85 y/o female patient presented to the ED with a chief complaint of palpitations, weakness, fatigue, cough, SOB, LE edema.  ED workup revealed sodium of 124, chloride of 93, BNP of 156. CXR with cardiomegaly. Scattered linear and ground-glass opacities in the mid lower lung zones. Blunting of the costophrenic angles compatible mall components of pleural fluid.  , pt was put on cardizem gtt.  Nephrology is consulted for hyponatremia.    12/28  Na+ the same, of note, pt on HCTZ at home.  Getting lasix only here.  BUN/Scr wnl.  TSH high.  ECHO done and report pending.  Na+ levels usually a little low w/baseline being around 134.  Breath sounds w/rhonchi and wheezing throughout, +LE edema.  denies SOB.    12/29  Na+ improving, urine osm 223, urine Na+ 53 .  ECHO w/EF 40-45%.  UA w/no cast, but 4+ glucose.  Low sp gravity.  Lungs sound much better today.  No new complaints.  12/30 VSS, on 2L NC, UOP 2.1L    Assessment/plan:    Hyponatremia, A/C  HTN  CHF  Afib w/RVR    --stable- continue to hold HCTZ, considering chronic hyponatremia, would consider d/c this completely--ok for loop diuretics- fluid restriction TBD  --BP controlled  --getting IV diuresis- ok to resume jardiance at discharge  --s/p DCCV, on metoprolol, eliquis     Review of Systems:  As above     apixaban  5 mg Oral BID    furosemide (LASIX) injection  20 mg Intravenous Daily    levothyroxine  112 mcg Oral Before breakfast    metoprolol tartrate  12.5 mg Oral BID    pravastatin  10 mg Oral QHS    predniSONE  20 mg Oral Daily    pregabalin  100 mg Oral BID        OBJECTIVE:     Vital Signs Range (Last 24H):  Temp:  [97.6 °F (36.4 °C)-98.3 °F (36.8 °C)]   Pulse:  [62-92]   Resp:  [15-31]   BP: (102-136)/(55-92)   SpO2:  [96 %-100 %]     Physical Exam:  General- NAD noted  HEENT- WNL  Neck-  supple  CV- Regular rate and rhythm  Resp- +wheezing/rhonchi.  No increased WOB  GI- Non tender/non-distended, BS normoactive x4 quads  Extrem- No cyanosis, clubbing, +edema.  Derm- skin w/d  Neuro-  No flap.     Body mass index is 26.68 kg/m².    Laboratory:  CBC:   Recent Labs   Lab 12/30/24  1130   WBC 11.17   RBC 4.69   HGB 13.6   HCT 40.8      MCV 87   MCH 29.0   MCHC 33.3     CMP:   Recent Labs   Lab 12/27/24  1600 12/27/24  2315 12/30/24  1130      < > 144*  145*   CALCIUM 9.1   < > 9.5  9.1   ALBUMIN 3.5  --   --    PROT 6.7  --   --    *   < > 131*  133*   K 4.3   < > 4.1  4.2   CO2 26   < > 30*  32*   CL 93*   < > 96  97   BUN 13   < > 18  18   CREATININE 0.6   < > 0.5  0.5   ALKPHOS 121  --   --    ALT 24  --   --    AST 35  --   --    BILITOT 0.6  --   --     < > = values in this interval not displayed.       Diagnostic Results:  Labs: Reviewed      ASSESSMENT/PLAN:     Active Hospital Problems    Diagnosis  POA    *Atrial fibrillation with RVR [I48.91]  Yes    Pericardial effusion [I31.39]  Yes    Hyponatremia [E87.1]  Yes    Congestive heart failure [I50.9]  Yes    Asthma [J45.909]  Yes    Hypertension, essential, benign [I10]  Yes    Hypothyroidism [E03.9]  Yes    Obesity (BMI 30-39.9) [E66.9]  Yes      Resolved Hospital Problems   No resolved problems to display.         Thank you for allowing us to participate in the care of your patient. We will follow the patient and provide recommendations as needed.    Patient care time was spent personally by me on the following activities: > 35 minutes  Obtaining a history.  Examination of patient.  Providing medical care at the patients bedside.  Developing a treatment plan with patient or surrogate and bedside caregivers.  Ordering and reviewing laboratory studies, radiographic studies, pulse oximetry.  Ordering and performing treatments and interventions.  Evaluation of patient's response to treatment.  Discussions with  consultants while on the unit and immediately available to the patient.  Re-evaluation of the patient's condition.  Documentation in the medical record.      Beltran Stout MD

## 2024-12-30 NOTE — PROGRESS NOTES
[facility]  Department of Cardiology  Consult Note      PATIENT NAME: Niurka Patel    MRN: [id number]  TODAY'S DATE: 12/30/2024  ADMIT DATE: 12/27/2024                          CONSULT REQUESTED BY: Ani Moss, *    SUBJECTIVE     PRINCIPAL PROBLEM: Atrial fibrillation with RVR    INTERVAL HISTORY    12/30/24    Resting in chair. NAD. 2L NC. S/p RODNEY/CV yest. Remains in SR.  Denies complaints. VSS, labs reviewed, stable. -1.4 L yest. Wheezing present.  Repeat echo stable.  Trivial effusion        HPI:    Ms. Patel is an 84 year old female who presented to the ER with complaints of palpitations. She has a known hx of PAF and was cardioverted about a month ago. She was found to be in Afib with RVR on arrival  as well as some evidence of CHF.  Patient seen resting in bed with no acute distress noted.  Seen lying relatively comfortably near flat.  She has been seen by Nephrology for hyponatremia.  Patient had been on amiodarone outpatient is noted to be hypothyroid and thus amiodarone has not been continued.      REASON FOR CONSULT:  From Hospitalist H&P: Ms. Patel is an 84 yr old female with a hx of with RVR, asthma, CHF, HTN, hypothyroidism, obesity, CVA, HLD, PE, tremors who presented to the ED with a chief complaint of palpitations.  Upon talking to patient she actually denies ever feeling palpitations or any, fluttering in her chest.  She states that over the last 4 weeks she has had increased generalized weakness and fatigue.  She also endorses productive cough with white sputum, shortness of breath, and leg swelling.  She states that her home health nurse came to check on her today and noticed that she seemed very wobbly while using her cane and advised her to come to the emergency room for further evaluation.  Patient states that she was in AFib about 1 month ago and had cardioversion on 11/26/2024 with successful conversion to normal sinus rhythm.  Per chart review, she was discharged  on amiodarone 400 mg b.i.d. for 10 days then 200 mg daily afterwards.  She was also told to continue taking her metoprolol and Eliquis.  She also endorses a history of CHF denies PND and orthopnea.  Denies any recent sick contacts or home oxygen use.  Endorses tobacco use and smokes 1 pack of cigarettes a day and denies alcohol and recreational drug use.  She denies fever, chills, palpitations, diaphoresis, chest pain, abdominal pain, nausea, vomiting, diarrhea, dysuria, hematuria, lightheadedness, dizziness, and syncope.     Upon arrival to ED, patient afebrile, HR of 122, RR of 18, BP of 103/74, satting 97% on RA.  Workup in the ED revealed sodium of 124, chloride of 93, BNP of 156.  CXR with cardiomegaly.  Scattered linear and ground-glass opacities in the mid lower lung zones.  Blunting of the costophrenic angles compatible mall components of pleural fluid.  Questionable nodular opacity in the right upper lobe near the apex.  Follow-up plain radiographs are recommended.  Patient was given aspirin 325 mg oral and initiated on diltiazem titrating gtt while in the ED. case discussed with ED provider and patient will be placed under observation for further management.     No past medical history on file.     No past surgical history on file.     Review of patient's allergies indicates:  No Known Allergies     No current facility-administered medications on file prior to encounter.         Review of patient's allergies indicates:  No Known Allergies    No past medical history on file.  Past Surgical History:   Procedure Laterality Date    CARDIOVERSION  12/29/2024           REVIEW OF SYSTEMS  Per HPI    OBJECTIVE     VITAL SIGNS (Most Recent)  Temp: 97.9 °F (36.6 °C) (12/30/24 0800)  Pulse: 80 (12/30/24 0821)  Resp: 20 (12/30/24 0700)  BP: 123/72 (12/30/24 0821)  SpO2: 100 % (12/30/24 0700)    VENTILATION STATUS  Resp: 20 (12/30/24 0700)  SpO2: 100 % (12/30/24 0700)           I & O (Last 24H):  Intake/Output Summary  (Last 24 hours) at 12/30/2024 1217  Last data filed at 12/30/2024 1009  Gross per 24 hour   Intake 971.37 ml   Output 950 ml   Net 21.37 ml       WEIGHTS  Wt Readings from Last 1 Encounters:   12/30/24 0514 70.5 kg (155 lb 6.8 oz)   12/29/24 0410 70.9 kg (156 lb 4.9 oz)   12/28/24 0530 73.7 kg (162 lb 7.7 oz)   12/27/24 1528 81.6 kg (180 lb)       PHYSICAL EXAM  CONSTITUTIONAL: NAD  HEENT: Normocephalic               NECK:  No JVD   CVS: S1S2+, RRR  LUNGS: Clear  ABDOMEN: nondistended   EXTREMITIES: mild b/l edema   NEURO: AAO X 3  PSY: Normal affect      HOME MEDICATIONS:  No current facility-administered medications on file prior to encounter.     Current Outpatient Medications on File Prior to Encounter   Medication Sig Dispense Refill    atorvastatin (LIPITOR) 10 MG tablet Take 10 mg by mouth once daily.      amiodarone (PACERONE) 200 MG Tab Take 1 tablet by mouth once daily.      azelastine (ASTELIN) 137 mcg (0.1 %) nasal spray 1 spray (137 mcg total) by Nasal route 2 (two) times daily as needed for Rhinitis. 30 mL 0    benzocaine-menthoL 15-3.6 mg Lozg 1 each by Mucous Membrane route every 4 (four) hours as needed. 18 lozenge 0    benzocaine-menthoL 6-10 mg lozenge Take 1 lozenge by mouth every 2 (two) hours as needed (Sore Throat). 18 tablet 0    cetirizine (ZYRTEC) 10 MG tablet Take 10 mg by mouth.      ELIQUIS 5 mg Tab Take 5 mg by mouth 2 (two) times daily.      ergocalciferol (ERGOCALCIFEROL) 50,000 unit Cap TAKE ONE CAPSULE BY MOUTH EVERY 30 DAYS      furosemide (LASIX) 20 MG tablet Take 1 tablet every day by oral route as needed.      guaiFENesin (MUCINEX) 600 mg 12 hr tablet Take 2 tablets (1,200 mg total) by mouth 2 (two) times daily. 30 tablet 0    hydroCHLOROthiazide (MICROZIDE) 12.5 mg capsule Take 12.5 mg by mouth.      hydrOXYzine HCL (ATARAX) 25 MG tablet TAKE ONE TABLET BY MOUTH THREE TIMES DAILY FOR 5 DAYS      ibandronate (BONIVA) 150 mg tablet TAKE ONE TABLET BY MOUTH EVERY 30 DAYS IN THE  MORNING      JARDIANCE 10 mg tablet Take 1 tablet by mouth once daily.      levocetirizine (XYZAL) 5 MG tablet Take 1 tablet (5 mg total) by mouth every evening. 30 tablet 0    metoprolol tartrate (LOPRESSOR) 50 MG tablet Take 1 tablet by mouth 2 (two) times daily.      pravastatin (PRAVACHOL) 10 MG tablet Take 1 tablet by mouth every evening.      pregabalin (LYRICA) 100 MG capsule Take 1 capsule by mouth 2 (two) times daily.      primidone (MYSOLINE) 50 MG Tab Take 5 tablets 3 times a day by oral route.      SYNTHROID 112 mcg tablet Take 1 tablet by mouth once daily.      triamcinolone acetonide 0.1% (KENALOG) 0.1 % cream APPLY A THIN LAYER TO THE AFFECTED AREA(S) BY TOPICAL ROUTE 2 TIMES PER DAY         SCHEDULED MEDS:   apixaban  5 mg Oral BID    furosemide (LASIX) injection  20 mg Intravenous Daily    levothyroxine  112 mcg Oral Before breakfast    metoprolol tartrate  12.5 mg Oral BID    pravastatin  10 mg Oral QHS    predniSONE  20 mg Oral Daily    pregabalin  100 mg Oral BID       CONTINUOUS INFUSIONS:        PRN MEDS:  Current Facility-Administered Medications:     acetaminophen, 650 mg, Oral, Q4H PRN    albuterol-ipratropium, 3 mL, Nebulization, Q4H PRN    aluminum-magnesium hydroxide-simethicone, 30 mL, Oral, QID PRN    dextrose 50%, 12.5 g, Intravenous, PRN    dextrose 50%, 25 g, Intravenous, PRN    glucagon (human recombinant), 1 mg, Intramuscular, PRN    glucose, 16 g, Oral, PRN    glucose, 24 g, Oral, PRN    magnesium oxide, 800 mg, Oral, PRN    magnesium oxide, 800 mg, Oral, PRN    melatonin, 6 mg, Oral, Nightly PRN    naloxone, 0.02 mg, Intravenous, PRN    ondansetron, 4 mg, Intravenous, Q6H PRN    potassium bicarbonate, 35 mEq, Oral, PRN    potassium bicarbonate, 50 mEq, Oral, PRN    potassium bicarbonate, 60 mEq, Oral, PRN    potassium, sodium phosphates, 2 packet, Oral, PRN    potassium, sodium phosphates, 2 packet, Oral, PRN    potassium, sodium phosphates, 2 packet, Oral, PRN    senna-docusate  "8.6-50 mg, 1 tablet, Oral, BID PRN    sodium chloride 0.9%, 10 mL, Intravenous, Q12H PRN    LABS AND DIAGNOSTICS     CBC LAST 3 DAYS  Recent Labs   Lab 12/28/24  1538 12/29/24  0400 12/30/24  1130   WBC 8.65 7.46 11.17   RBC 4.42 4.08 4.69   HGB 12.6 11.8* 13.6   HCT 38.7 35.3* 40.8   MCV 88 87 87   MCH 28.5 28.9 29.0   MCHC 32.6 33.4 33.3   RDW 15.6* 15.6* 15.3*    352 421   MPV 9.7 9.0* 8.7*   GRAN 73.8*  6.4 66.8  5.0 83.6*  9.3*   LYMPH 10.8*  0.9* 16.5*  1.2 7.5*  0.8*   MONO 10.9  0.9 9.9  0.7 5.3  0.6   BASO 0.06 0.09 0.07   NRBC 0 0 0       COAGULATION LAST 3 DAYS  Recent Labs   Lab 12/27/24  1600 12/28/24  0345   INR 1.1 1.1   APTT  --  41.6*       CHEMISTRY LAST 3 DAYS  Recent Labs   Lab 12/27/24  1600 12/27/24  2315 12/28/24  1653 12/29/24  0400 12/30/24  1130   *   < > 130* 132* 131*  133*   K 4.3   < > 4.2 4.0 4.1  4.2   CL 93*   < > 98 99 96  97   CO2 26   < > 22* 28 30*  32*   ANIONGAP 5*   < > 10 5* 5*  4*   BUN 13   < > 12 14 18  18   CREATININE 0.6   < > 0.5 0.5 0.5  0.5      < > 107 98 144*  145*   CALCIUM 9.1   < > 8.7 8.8 9.5  9.1   MG 2.1   < > 2.1 2.0 1.8   ALBUMIN 3.5  --   --   --   --    PROT 6.7  --   --   --   --    ALKPHOS 121  --   --   --   --    ALT 24  --   --   --   --    AST 35  --   --   --   --    BILITOT 0.6  --   --   --   --     < > = values in this interval not displayed.       CARDIAC PROFILE LAST 3 DAYS  Recent Labs   Lab 12/27/24  1600   *   TROPONINIHS 13.6       ENDOCRINE LAST 3 DAYS  Recent Labs   Lab 12/27/24  1600   TSH 10.384*       LAST ARTERIAL BLOOD GAS  ABG  No results for input(s): "PH", "PO2", "PCO2", "HCO3", "BE" in the last 168 hours.    LAST 7 DAYS MICROBIOLOGY   Microbiology Results (last 7 days)       ** No results found for the last 168 hours. **            MOST RECENT IMAGING  Echo    Left Ventricle: The left ventricle is normal in size. There is   concentric remodeling. Global hypokinesis present. There is " mildly reduced   systolic function with a visually estimated ejection fraction of 40 - 45%.   Unable to assess diastolic function due to atrial fibrillation.    Right Ventricle: Normal right ventricular cavity size. Systolic   function is moderately reduced.    Left Atrium: Left atrium is moderately dilated.    Right Atrium: Right atrium is moderately dilated.    Mitral Valve: There is moderate regurgitation.    Tricuspid Valve: There is mild regurgitation.    Pulmonic Valve: There is mild regurgitation.    Pericardium: There is a moderate circumferential effusion. No   definitive evidence of tamponade on this study however difficult to assess   due to tachycardia and irregular rhythm.  Correlate clinically.  Consider   repeating echocardiogram in 1-2 days for reassessment of effusion.  X-Ray Chest AP Single View  Narrative: EXAMINATION:  XR CHEST 1 VIEW    CLINICAL HISTORY:  arrhythmia;    FINDINGS:  portable chest x-ray at 15:07 hours is compared to a prior study dated 12/27/2024    The heart is enlarged..  The previously noted nodular opacity in the left apex is not visualized on today's study and most likely representing overlapping shadows.    There is blunting of the left costophrenic angle with atelectasis in left lung base.  The right lung is clear.  There are degenerative changes of the spine.  Impression: Cardiomegaly with blunting of the left costophrenic angle    The previously noted nodular opacity in the left apex is not identified in most likely represented overlapping shadows.    Electronically signed by: Maria Newell  Date:    12/28/2024  Time:    15:29      ECHOCARDIOGRAM RESULTS (last 5)  No results found for this or any previous visit.      CURRENT/PREVIOUS VISIT EKG  Results for orders placed or performed during the hospital encounter of 12/27/24   EKG 12-lead    Collection Time: 12/29/24 11:27 AM   Result Value Ref Range    QRS Duration 96 ms    [facility] QTC Calculation 481 ms    Narrative     Test Reason : Z01.89,    Vent. Rate :  72 BPM     Atrial Rate :  72 BPM     P-R Int : 232 ms          QRS Dur :  96 ms      QT Int : 440 ms       P-R-T Axes :  10  -4   0 degrees    QTcB Int : 481 ms    Sinus rhythm with 1st degree A-V block  Low voltage QRS  Incomplete right bundle branch block  Cannot rule out Anterior infarct ,age undetermined  Abnormal ECG  When compared with ECG of 28-Dec-2024 15:25,  Sinus rhythm has replaced Atrial fibrillation  The axis Shifted left  Minimal criteria for Anterior infarct are now Present  Nonspecific T wave abnormality now evident in Inferior leads    Referred By: AAAREFERRAL SELF           Confirmed By:            ASSESSMENT/PLAN:     Active Hospital Problems    Diagnosis    *Atrial fibrillation with RVR    Pericardial effusion    Hyponatremia    Congestive heart failure    Asthma    Hypertension, essential, benign    Hypothyroidism    Obesity (BMI 30-39.9)       ASSESSMENT & PLAN:     Afib with RVR s/p DCCV  HTN  Hx of CVA  Smoker   Hypothyroidism      RECOMMENDATIONS:    S/p cardioversion yest.  Remains in SR.  VSS.  Hypothyroidism this admission.  Will defer to medicine team for management.    Echo this admission shows EF 40-45% and moderate pericardial effusion, no evidence of tamponade.  Repeat echo performed today.  EF now 55-60% and trivial pericardial effusion noted.   Echo from August of 2024 showed EF of 60%.  Acute HFrEF.  Recommend optimizing GDMT as tolerated.  Continue metoprolol tartrate 12.5 mg BID.    Continue Eliquis 5 mg BID  Continue IV lasix today. Transition to lasix 20 mg daily tomorrow.  Strict I&O.  Trend labs.  Wheezing noted.  Recommend xopenex instead of albuterol.  2 g salt restriction, 1.5 L fluid restriction.  Daily weights.  -1.4 L yesterday.   If patient is euvolemic and hemodynamically stable tomorrow, likely stable for discharge from cardiac standpoint with the above recommendations.        Linda Gonzales NP  Cardiology  Date of Service:  12/30/2024      I have personally interviewed and examined the patient, I have reviewed the Nurse Practitioner's history and physical, assessment, and plan. I have personally evaluated the patient at bedside and agree with the findings and made appropriate changes as necessary in recommendations.  All pertinent recent labs, imaging and EKGs independently reviewed and interpreted.     Jacqueline Santos MD  Department of Cardiology  [facility]  12/30/2024

## 2024-12-30 NOTE — PLAN OF CARE
Problem: Skin Injury Risk Increased  Goal: Skin Health and Integrity  Outcome: Progressing     Problem: Wound  Goal: Skin Health and Integrity  Outcome: Progressing     Problem: Wound  Goal: Optimal Pain Control and Function  Outcome: Progressing

## 2024-12-30 NOTE — ASSESSMENT & PLAN NOTE
- moderate pericardial effusion on 2D echocardiogram 12/28   - should improve after cardioversion today  - repeat limited echo today  - Clinically not in tamponade physiology  - asymptomatic, vitals stable  - cardiology following  - caution with IV diuresis

## 2024-12-30 NOTE — ASSESSMENT & PLAN NOTE
- Continue home synthroid  - TSH elevated, Free T4 normal  - She will need repeat TFTs and Synthroid dose adjusting once heart rates improve.  - Repeat TSH to make sure this is not euthyroid sick syndrome, EPIC gives a hard stop

## 2024-12-30 NOTE — PROGRESS NOTES
[facility]  Progress Note    Patient Name: Niurka Patel  MRN: [id number]  Patient Class: IP- Inpatient   Admission Date: 12/27/2024  Length of Stay: 2 days  Attending Physician: Ani Moss, *  Primary Care Provider: No, Primary Doctor        Subjective     Principal Problem:Atrial fibrillation with RVR        HPI:  Ms. Patel is an 84 yr old female with a hx of with RVR, asthma, CHF, HTN, hypothyroidism, obesity, CVA, HLD, PE, tremors who presented to the ED with a chief complaint of palpitations.  Upon talking to patient she actually denies ever feeling palpitations or any, fluttering in her chest.  She states that over the last 4 weeks she has had increased generalized weakness and fatigue.  She also endorses productive cough with white sputum, shortness of breath, and leg swelling.  She states that her home health nurse came to check on her today and noticed that she seemed very wobbly while using her cane and advised her to come to the emergency room for further evaluation.  Patient states that she was in AFib about 1 month ago and had cardioversion on 11/26/2024 with successful conversion to normal sinus rhythm.  Per chart review, she was discharged on amiodarone 400 mg b.i.d. for 10 days then 200 mg daily afterwards.  She was also told to continue taking her metoprolol and Eliquis.  She also endorses a history of CHF denies PND and orthopnea.  Denies any recent sick contacts or home oxygen use.  Endorses tobacco use and smokes 1 pack of cigarettes a day and denies alcohol and recreational drug use.  She denies fever, chills, palpitations, diaphoresis, chest pain, abdominal pain, nausea, vomiting, diarrhea, dysuria, hematuria, lightheadedness, dizziness, and syncope.    Upon arrival to ED, patient afebrile, HR of 122, RR of 18, BP of 103/74, satting 97% on RA.  Workup in the ED revealed sodium of 124, chloride of 93, BNP of 156.  CXR with cardiomegaly.  Scattered  linear and ground-glass opacities in the mid lower lung zones.  Blunting of the costophrenic angles compatible mall components of pleural fluid.  Questionable nodular opacity in the right upper lobe near the apex.  Follow-up plain radiographs are recommended.  Patient was given aspirin 325 mg oral and initiated on diltiazem titrating gtt while in the ED. case discussed with ED provider and patient will be placed under observation for further management.    Overview/Hospital Course:  Patient was admitted for atrial fibrillation in R and had hyponatremia.  Patient was started on diltiazem drip.  Cardiology was consulted.  Cardioversion planned for 12/29.  Electrolytes repleted.  For hyponatremia, HCTZ was stopped. Lasix was given with caution for CHF.  Nephrology closely followed the patient.  At presentation, TSH was elevated but free T4 was normal, held off increasing Synthroid given active AFib with heart rates 130-140.  2D echocardiogram showed moderate pericardial effusion, but clinically she was not in tamponade.  At pressures were stable on diltiazem drip.  Successful cardioversion to sinus rhythm with 2 shocks of 200 joules.  Diltiazem drip stopped and patient was started on low-dose beta blocker given EF reduced to 40-45% on 2D echocardiogram.  Sodium level improved, she responded well to IV Lasix. HR stable with beta blocker. Repeat ECHO ordered to monitor for pericardial effusion.    Interval History:  Patient is seen and examined at bedside this morning.      Continues to be in sinus rhythm and feels much better.    Repeat limited ECHO ordered to monitor for pericardial effusion.      No chest pain, no palpitations, no dizziness, no shortness of breath, she mentioned today she feels much better compared to yesterday.  Review of Systems  Objective:     Vital Signs (Most Recent):  Temp: 97.9 °F (36.6 °C) (12/30/24 0800)  Pulse: 80 (12/30/24 0821)  Resp: 20 (12/30/24 0700)  BP: 123/72 (12/30/24 0821)  SpO2:  100 % (24 0700) Vital Signs (24h Range):  Temp:  [97.6 °F (36.4 °C)-98.3 °F (36.8 °C)] 97.9 °F (36.6 °C)  Pulse:  [62-92] 80  Resp:  [15-31] 20  SpO2:  [96 %-100 %] 100 %  BP: (102-136)/(55-92) 123/72     Weight: 70.5 kg (155 lb 6.8 oz)  Body mass index is 26.68 kg/m².    Intake/Output Summary (Last 24 hours) at 2024 1429  Last data filed at 2024 1009  Gross per 24 hour   Intake 731.37 ml   Output 950 ml   Net -218.63 ml         Physical Exam  Vitals reviewed.   Constitutional:       General: She is awake. She is not in acute distress.     Appearance: Normal appearance. She is not ill-appearing or diaphoretic.   Cardiovascular:      Rate and Rhythm: Tachycardia present. Rhythm irregularly irregular.      Heart sounds: Normal heart sounds.      No friction rub. No gallop.      Comments: Irregularly irregular rhythm noted on monitor during interview and exam.  Pulmonary:      Effort: Pulmonary effort is normal. No accessory muscle usage or respiratory distress.      Breath sounds: Wheezing (Diffuse expiratory) and rales (bibasilar) present.      Comments: Improving  Abdominal:      General: Abdomen is flat. Bowel sounds are normal.      Palpations: Abdomen is soft.      Tenderness: There is no abdominal tenderness. There is no guarding or rebound.   Musculoskeletal:      Right lower le+ Pitting Edema present.      Left lower le+ Pitting Edema present.   Skin:     General: Skin is warm and dry.   Neurological:      Mental Status: She is alert and oriented to person, place, and time.   Psychiatric:         Behavior: Behavior is cooperative.             Significant Labs: All pertinent labs within the past 24 hours have been reviewed.    Significant Imaging: I have reviewed all pertinent imaging results/findings within the past 24 hours.    Assessment and Plan     * Atrial fibrillation with RVR  Patient has paroxysmal (<7 days) atrial fibrillation. Patient is currently in atrial fibrillation.  [id number] Score: 4. The patients heart rate in the last 24 hours is as follows:  Pulse  Min: 62  Max: 129     Antiarrhythmics  metoprolol tartrate (LOPRESSOR) split tablet 12.5 mg, 2 times daily, Oral    Anticoagulants  apixaban tablet 5 mg, 2 times daily, Oral    Plan  - Replete lytes with a goal of K>4, Mg >2  - Patient is anticoagulated, see medications listed above.  - Patient's afib is currently uncontrolled. Will continue current treatment  - Underwent cardioversion on 11/26/2024 with conversion to NSR, was sent home on amio 400 mg BID for 10 days then 200 mg daily afterwards, says she has been compliant. Was also told to resume Toprol and eliquis.  - Initiated on diltiazem gtt in the ED  - Cardiology consulted  - cardioversion 12/30, diltiazem drip stopped, low-dose beta blocker started given reduced EF    Pericardial effusion  - moderate pericardial effusion on 2D echocardiogram 12/28   - should improve after cardioversion today  - repeat limited echo today  - Clinically not in tamponade physiology  - asymptomatic, vitals stable  - cardiology following  - caution with IV diuresis      Hyponatremia  The patient's most recent sodium results are listed below.  Recent Labs     12/28/24  1653 12/29/24  0400 12/30/24  1130   * 132* 131*  133*       Plan  - Correct the sodium by 4-6mEq in 24 hours.   - Urine sodium, urine osmolality, serum osmolality, AM cortisol, or TSH, T4.  - Will hold on fluids for now given patient appears slightly fluid overloaded.  - Monitor sodium Daily.   - Patient hyponatremia is stable  - Nephrology consulted, appreciate recs  - Hold HCTZ  - IV lasix for diuresis with caution  - TSH elevated, Free T4 normal - needs repeat labs with synthyroid  - Adjust dose after HR better controlled    Improved        Obesity (BMI 30-39.9)  Body mass index is 26.68 kg/m². Morbid obesity complicates all aspects of disease management from diagnostic modalities to  treatment.        Hypothyroidism  - Continue home synthroid  - TSH elevated, Free T4 normal  - She will need repeat TFTs and Synthroid dose adjusting once heart rates improve.  - Repeat TSH to make sure this is not euthyroid sick syndrome, EPIC gives a hard stop        Hypertension, essential, benign  Latest blood pressure and vitals reviewed-   Temp:  [96.7 °F (35.9 °C)-98.1 °F (36.7 °C)]   Pulse:  []   Resp:  [18-28]   BP: ()/(57-97)   SpO2:  [91 %-97 %] .     Home meds for hypertension were reviewed and noted below.   Hypertension Medications               furosemide (LASIX) 20 MG tablet Take 1 tablet every day by oral route as needed.    hydroCHLOROthiazide (MICROZIDE) 12.5 mg capsule Take 12.5 mg by mouth.    metoprolol tartrate (LOPRESSOR) 50 MG tablet Take 1 tablet by mouth 2 (two) times daily.          - Holding home HCTZ in the setting of hyponatremia  - Continue home metoprolol with parameters  - Lasix with caution    Congestive heart failure  Patient has  hx of  heart failure that is Acute on chronic. On presentation their CHF was decompensated. Evidence of decompensated CHF on presentation includes: edema, dyspnea on exertion (FROST), and shortness of breath. Most recent BNP and echo results are listed below.      Recent Labs     12/27/24  1600   *       Latest ECHO  No results found for this or any previous visit.    Current Heart Failure Medications  furosemide injection 20 mg, Daily, Intravenous    Plan  - Monitor strict I&Os and daily weights.    - Place on telemetry  - Low sodium diet  - Place on fluid restriction of 1.5 L.   - Cardiology has been consulted  - The patient's volume status is stable but not at their baseline as indicated by edema, crackles on lung auscultation, dyspnea on exertion (FROST), and shortness of breath  - on review of pt's last echo, revealed normal EF and undetermined diastolic function.  - Cardiology consulted, appreciate recs  - IV Lasix with caution  -  hold hydrochlorothiazide with hyponatremia.        Asthma  - DuoNebs Q6H PRN  - Not in active exacerbation        VTE Risk Mitigation (From admission, onward)           Ordered     apixaban tablet 5 mg  2 times daily         12/27/24 1807     Reason for No Pharmacological VTE Prophylaxis  Once        Question:  Reasons:  Answer:  Already adequately anticoagulated on oral Anticoagulants    12/27/24 1748     IP VTE HIGH RISK PATIENT  Once         12/27/24 1748     Place sequential compression device  Until discontinued         12/27/24 1748                    Discharge Planning   CYNTHIA: 1/2/2025     Code Status: Full Code   Medical Readiness for Discharge Date:   Discharge Plan A: Home Health   Discharge Delays: Orders Needed                    Ani Moss MD  Department of Hospital Medicine   [facility]

## 2024-12-30 NOTE — SUBJECTIVE & OBJECTIVE
Interval History:  Patient is seen and examined at bedside this morning.      Continues to be in sinus rhythm and feels much better.    Repeat limited ECHO ordered to monitor for pericardial effusion.      No chest pain, no palpitations, no dizziness, no shortness of breath, she mentioned today she feels much better compared to yesterday.  Review of Systems  Objective:     Vital Signs (Most Recent):  Temp: 97.9 °F (36.6 °C) (24 0800)  Pulse: 80 (24 0821)  Resp: 20 (24 0700)  BP: 123/72 (24 0821)  SpO2: 100 % (24) Vital Signs (24h Range):  Temp:  [97.6 °F (36.4 °C)-98.3 °F (36.8 °C)] 97.9 °F (36.6 °C)  Pulse:  [62-92] 80  Resp:  [15-31] 20  SpO2:  [96 %-100 %] 100 %  BP: (102-136)/(55-92) 123/72     Weight: 70.5 kg (155 lb 6.8 oz)  Body mass index is 26.68 kg/m².    Intake/Output Summary (Last 24 hours) at 2024 1429  Last data filed at 2024 1009  Gross per 24 hour   Intake 731.37 ml   Output 950 ml   Net -218.63 ml         Physical Exam  Vitals reviewed.   Constitutional:       General: She is awake. She is not in acute distress.     Appearance: Normal appearance. She is not ill-appearing or diaphoretic.   Cardiovascular:      Rate and Rhythm: Tachycardia present. Rhythm irregularly irregular.      Heart sounds: Normal heart sounds.      No friction rub. No gallop.      Comments: Irregularly irregular rhythm noted on monitor during interview and exam.  Pulmonary:      Effort: Pulmonary effort is normal. No accessory muscle usage or respiratory distress.      Breath sounds: Wheezing (Diffuse expiratory) and rales (bibasilar) present.      Comments: Improving  Abdominal:      General: Abdomen is flat. Bowel sounds are normal.      Palpations: Abdomen is soft.      Tenderness: There is no abdominal tenderness. There is no guarding or rebound.   Musculoskeletal:      Right lower le+ Pitting Edema present.      Left lower le+ Pitting Edema present.   Skin:      General: Skin is warm and dry.   Neurological:      Mental Status: She is alert and oriented to person, place, and time.   Psychiatric:         Behavior: Behavior is cooperative.             Significant Labs: All pertinent labs within the past 24 hours have been reviewed.    Significant Imaging: I have reviewed all pertinent imaging results/findings within the past 24 hours.

## 2024-12-31 LAB
ANION GAP SERPL CALC-SCNC: 5 MMOL/L (ref 8–16)
ANION GAP SERPL CALC-SCNC: 9 MMOL/L (ref 8–16)
BASOPHILS # BLD AUTO: 0.03 K/UL (ref 0–0.2)
BASOPHILS NFR BLD: 0.2 % (ref 0–1.9)
BUN SERPL-MCNC: 16 MG/DL (ref 8–23)
BUN SERPL-MCNC: 26 MG/DL (ref 8–23)
CALCIUM SERPL-MCNC: 8.5 MG/DL (ref 8.7–10.5)
CALCIUM SERPL-MCNC: 9.6 MG/DL (ref 8.7–10.5)
CHLORIDE SERPL-SCNC: 94 MMOL/L (ref 95–110)
CHLORIDE SERPL-SCNC: 98 MMOL/L (ref 95–110)
CO2 SERPL-SCNC: 27 MMOL/L (ref 23–29)
CO2 SERPL-SCNC: 32 MMOL/L (ref 23–29)
CREAT SERPL-MCNC: 0.5 MG/DL (ref 0.5–1.4)
CREAT SERPL-MCNC: 1.3 MG/DL (ref 0.5–1.4)
DIFFERENTIAL METHOD BLD: ABNORMAL
EOSINOPHIL # BLD AUTO: 0.2 K/UL (ref 0–0.5)
EOSINOPHIL NFR BLD: 1.5 % (ref 0–8)
ERYTHROCYTE [DISTWIDTH] IN BLOOD BY AUTOMATED COUNT: 15.3 % (ref 11.5–14.5)
ERYTHROCYTE [DISTWIDTH] IN BLOOD BY AUTOMATED COUNT: 16.3 % (ref 11.5–14.5)
EST. GFR  (NO RACE VARIABLE): 40.3 ML/MIN/1.73 M^2
EST. GFR  (NO RACE VARIABLE): >60 ML/MIN/1.73 M^2
GLUCOSE SERPL-MCNC: 108 MG/DL (ref 70–110)
GLUCOSE SERPL-MCNC: 189 MG/DL (ref 70–110)
HCT VFR BLD AUTO: 25.9 % (ref 37–48.5)
HCT VFR BLD AUTO: 40.5 % (ref 37–48.5)
HGB BLD-MCNC: 13.3 G/DL (ref 12–16)
HGB BLD-MCNC: 8.4 G/DL (ref 12–16)
IMM GRANULOCYTES # BLD AUTO: 0.18 K/UL (ref 0–0.04)
IMM GRANULOCYTES NFR BLD AUTO: 1.3 % (ref 0–0.5)
LYMPHOCYTES # BLD AUTO: 1.6 K/UL (ref 1–4.8)
LYMPHOCYTES NFR BLD: 11 % (ref 18–48)
MAGNESIUM SERPL-MCNC: 1.5 MG/DL (ref 1.6–2.6)
MCH RBC QN AUTO: 27.8 PG (ref 27–31)
MCH RBC QN AUTO: 28.3 PG (ref 27–31)
MCHC RBC AUTO-ENTMCNC: 32.4 G/DL (ref 32–36)
MCHC RBC AUTO-ENTMCNC: 32.8 G/DL (ref 32–36)
MCV RBC AUTO: 86 FL (ref 82–98)
MCV RBC AUTO: 86 FL (ref 82–98)
MONOCYTES # BLD AUTO: 1.3 K/UL (ref 0.3–1)
MONOCYTES NFR BLD: 9.1 % (ref 4–15)
NEUTROPHILS # BLD AUTO: 11 K/UL (ref 1.8–7.7)
NEUTROPHILS NFR BLD: 76.9 % (ref 38–73)
NRBC BLD-RTO: 0 /100 WBC
PHOSPHATE SERPL-MCNC: 3.7 MG/DL (ref 2.7–4.5)
PLATELET # BLD AUTO: 475 K/UL (ref 150–450)
PLATELET # BLD AUTO: 514 K/UL (ref 150–450)
PMV BLD AUTO: 10 FL (ref 9.2–12.9)
PMV BLD AUTO: 9 FL (ref 9.2–12.9)
POCT GLUCOSE: 124 MG/DL (ref 70–110)
POTASSIUM SERPL-SCNC: 4 MMOL/L (ref 3.5–5.1)
POTASSIUM SERPL-SCNC: 4.7 MMOL/L (ref 3.5–5.1)
RBC # BLD AUTO: 3.02 M/UL (ref 4–5.4)
RBC # BLD AUTO: 4.7 M/UL (ref 4–5.4)
SODIUM SERPL-SCNC: 131 MMOL/L (ref 136–145)
SODIUM SERPL-SCNC: 134 MMOL/L (ref 136–145)
WBC # BLD AUTO: 10.91 K/UL (ref 3.9–12.7)
WBC # BLD AUTO: 14.34 K/UL (ref 3.9–12.7)

## 2024-12-31 PROCEDURE — 99900035 HC TECH TIME PER 15 MIN (STAT)

## 2024-12-31 PROCEDURE — 97116 GAIT TRAINING THERAPY: CPT | Mod: CQ

## 2024-12-31 PROCEDURE — 36415 COLL VENOUS BLD VENIPUNCTURE: CPT

## 2024-12-31 PROCEDURE — 12000002 HC ACUTE/MED SURGE SEMI-PRIVATE ROOM

## 2024-12-31 PROCEDURE — 80048 BASIC METABOLIC PNL TOTAL CA: CPT | Mod: 91

## 2024-12-31 PROCEDURE — 25000003 PHARM REV CODE 250

## 2024-12-31 PROCEDURE — 85027 COMPLETE CBC AUTOMATED: CPT

## 2024-12-31 PROCEDURE — 63600175 PHARM REV CODE 636 W HCPCS

## 2024-12-31 PROCEDURE — 27000221 HC OXYGEN, UP TO 24 HOURS

## 2024-12-31 PROCEDURE — 83735 ASSAY OF MAGNESIUM: CPT

## 2024-12-31 PROCEDURE — 94761 N-INVAS EAR/PLS OXIMETRY MLT: CPT

## 2024-12-31 PROCEDURE — 25000003 PHARM REV CODE 250: Performed by: NURSE PRACTITIONER

## 2024-12-31 PROCEDURE — 94618 PULMONARY STRESS TESTING: CPT

## 2024-12-31 PROCEDURE — 85025 COMPLETE CBC W/AUTO DIFF WBC: CPT

## 2024-12-31 PROCEDURE — 84100 ASSAY OF PHOSPHORUS: CPT

## 2024-12-31 PROCEDURE — 21400001 HC TELEMETRY ROOM

## 2024-12-31 RX ORDER — FUROSEMIDE 10 MG/ML
20 INJECTION INTRAMUSCULAR; INTRAVENOUS DAILY
Status: DISCONTINUED | OUTPATIENT
Start: 2025-01-01 | End: 2025-01-02

## 2024-12-31 RX ADMIN — APIXABAN 5 MG: 5 TABLET, FILM COATED ORAL at 08:12

## 2024-12-31 RX ADMIN — METOPROLOL TARTRATE 12.5 MG: 25 TABLET, FILM COATED ORAL at 08:12

## 2024-12-31 RX ADMIN — PREGABALIN 100 MG: 75 CAPSULE ORAL at 08:12

## 2024-12-31 RX ADMIN — PRAVASTATIN SODIUM 10 MG: 10 TABLET ORAL at 08:12

## 2024-12-31 RX ADMIN — Medication 800 MG: at 08:12

## 2024-12-31 RX ADMIN — LEVOTHYROXINE SODIUM 112 MCG: 112 TABLET ORAL at 06:12

## 2024-12-31 RX ADMIN — FUROSEMIDE 20 MG: 20 TABLET ORAL at 08:12

## 2024-12-31 RX ADMIN — Medication 800 MG: at 12:12

## 2024-12-31 RX ADMIN — PREDNISONE 20 MG: 20 TABLET ORAL at 08:12

## 2024-12-31 NOTE — PT/OT/SLP PROGRESS
Physical Therapy Treatment    Patient Name:  Niurka Patel   MRN:  [id number]    Recommendations:     Discharge Recommendations: Low Intensity Therapy  Discharge Equipment Recommendations: none  Barriers to discharge:  weakness, impaired endurance, gait instability, impaired functional mobility, impaired activity tolerance.     Assessment:     Niurka Patel is a 84 y.o. female admitted with a medical diagnosis of Atrial fibrillation with RVR.  She presents with the following impairments/functional limitations: weakness, impaired endurance, gait instability, impaired functional mobility, impaired cardiopulmonary response to activity, edema.    Pt found sitting up in bed side chair, agreeable to skilled physical therapy session today.     She performed sit to stand from bed side chair with RW and CGA.     She ambulated two bouts of 250ft with RW, 2LO2, and CGA. She ambulated with appropriate gait technique but did require one extended sitting rest break during bouts of ambulation secondary to fatigue.    She ambulated back to her room where she was agreeable to sitting up in her bed side chair. Pt left sitting up in chair, chair alarm on, call light within reach, all needs met, and RN notified.     Rehab Prognosis: Fair; patient would benefit from acute skilled PT services to address these deficits and reach maximum level of function.    Recent Surgery: Procedure(s) (LRB):  Cardioversion or Defibrillation (N/A) 2 Days Post-Op    Plan:     During this hospitalization, patient to be seen 5 x/week to address the identified rehab impairments via gait training, therapeutic activities, therapeutic exercises and progress toward the following goals:    Plan of Care Expires:  01/28/25    Subjective     Chief Complaint: fatigue  Patient/Family Comments/goals: to get better and go home  Pain/Comfort:  Pain Rating 1: 0/10      Objective:     Communicated with RN prior to session.  Patient found up in chair with peripheral IV,  oxygen, telemetry upon PT entry to room.     General Precautions: Standard, fall  Orthopedic Precautions: N/A  Braces: N/A  Respiratory Status: Nasal cannula, flow 2 L/min     Functional Mobility:  Transfers:     Sit to Stand:  contact guard assistance with rolling walker  Gait: [id number] with RW and CGA.       AM-PAC 6 CLICK MOBILITY          Treatment & Education:  Pt educated on importance of time OOB, importance of intermittent mobility, safe techniques for transfers/ambulation, discharge recommendations/options, and use of call light for assistance and fall prevention.      Patient left up in chair with all lines intact, call button in reach, chair alarm on, and RN notified..    GOALS:   Multidisciplinary Problems       Physical Therapy Goals          Problem: Physical Therapy    Goal Priority Disciplines Outcome Interventions   Physical Therapy Goal     PT, PT/OT Progressing    Description: Goals to be met by: 25     Patient will increase functional independence with mobility by performin. Supine to sit with Modified [place]  2. Sit to supine with Modified [place]  3. Sit to stand transfer with Modified [place]  4. Bed to chair transfer with Modified [place] using Rolling Walker  5. Gait  x 150 feet with Modified [place] using Rolling Walker maintaining SPO2 above 90%.                          Time Tracking:     PT Received On: 24  PT Start Time: 1144     PT Stop Time: 1207  PT Total Time (min): 23 min     Billable Minutes: Gait Training 23       PT/PTA: PTA     Number of PTA visits since last PT visit: 2024

## 2024-12-31 NOTE — ASSESSMENT & PLAN NOTE
The patient's most recent sodium results are listed below.  Recent Labs     12/30/24  1130 12/31/24  0511 12/31/24  1342   *  133* 134* 131*       Plan  - Correct the sodium by 4-6mEq in 24 hours.   - Urine sodium, urine osmolality, serum osmolality, AM cortisol, or TSH, T4.  - TSH elevated, Free T4 normal - needs repeat labs with synthyroid  - hold IV fluids and reduced ejection fraction  - Monitor sodium Daily.   - Patient hyponatremia is improving  - Nephrology closely following  - discontinue HCTZ at discharge  - IV lasix for diuresis with caution as tolerated  - monitor repeat BMP

## 2024-12-31 NOTE — PROGRESS NOTES
Progress Note  Nephrology    Consult Requested By: Ani Moss, *    Reason for Consult:   hyponatremia    SUBJECTIVE:     History of Present Illness:   83 y/o female patient presented to the ED with a chief complaint of palpitations, weakness, fatigue, cough, SOB, LE edema.  ED workup revealed sodium of 124, chloride of 93, BNP of 156. CXR with cardiomegaly. Scattered linear and ground-glass opacities in the mid lower lung zones. Blunting of the costophrenic angles compatible mall components of pleural fluid.  , pt was put on cardizem gtt.  Nephrology is consulted for hyponatremia.    12/28  Na+ the same, of note, pt on HCTZ at home.  Getting lasix only here.  BUN/Scr wnl.  TSH high.  ECHO done and report pending.  Na+ levels usually a little low w/baseline being around 134.  Breath sounds w/rhonchi and wheezing throughout, +LE edema.  denies SOB.    12/29  Na+ improving, urine osm 223, urine Na+ 53 .  ECHO w/EF 40-45%.  UA w/no cast, but 4+ glucose.  Low sp gravity.  Lungs sound much better today.  No new complaints.  12/30 VSS, on 2L NC, UOP 2.1L  12/31 VSS, on 2L NC, UOP 300cc    Assessment/plan:    CED  Hyponatremia, A/C  HTN  CHF  Afib w/RVR    --new CED- stop IV lasix  --serum Na stable/improved- continue to hold HCTZ indefinitely- fluid restriction TBD  --BP controlled  --BP controlled- VS improved- ok to resume jardiance at discharge if renal function at baseline  --s/p DCCV, on metoprolol, eliquis     Review of Systems:  As above     apixaban  5 mg Oral BID    levothyroxine  112 mcg Oral Before breakfast    metoprolol tartrate  12.5 mg Oral BID    pravastatin  10 mg Oral QHS    predniSONE  20 mg Oral Daily    pregabalin  100 mg Oral BID        OBJECTIVE:     Vital Signs Range (Last 24H):  Temp:  [97.5 °F (36.4 °C)-98.1 °F (36.7 °C)]   Pulse:  [61-78]   Resp:  [14-35]   BP: (113-137)/(63-86)   SpO2:  [97 %-100 %]     Physical Exam:  General- NAD noted  HEENT- WNL  Neck- supple  CV- Regular  rate and rhythm  Resp- +wheezing/rhonchi.  No increased WOB  GI- Non tender/non-distended, BS normoactive x4 quads  Extrem- No cyanosis, clubbing, +edema.  Derm- skin w/d  Neuro-  No flap.     Body mass index is 26.68 kg/m².    Laboratory:  CBC:   Recent Labs   Lab 12/31/24  0511   WBC 14.34*   RBC 3.02*   HGB 8.4*   HCT 25.9*   *   MCV 86   MCH 27.8   MCHC 32.4     CMP:   Recent Labs   Lab 12/27/24  1600 12/27/24  2315 12/31/24  0511      < > 108   CALCIUM 9.1   < > 8.5*   ALBUMIN 3.5  --   --    PROT 6.7  --   --    *   < > 134*   K 4.3   < > 4.0   CO2 26   < > 27   CL 93*   < > 98   BUN 13   < > 26*   CREATININE 0.6   < > 1.3   ALKPHOS 121  --   --    ALT 24  --   --    AST 35  --   --    BILITOT 0.6  --   --     < > = values in this interval not displayed.       Diagnostic Results:  Labs: Reviewed      ASSESSMENT/PLAN:     Active Hospital Problems    Diagnosis  POA    *Atrial fibrillation with RVR [I48.91]  Yes    Pericardial effusion [I31.39]  Yes    Hyponatremia [E87.1]  Yes    Congestive heart failure [I50.9]  Yes    Asthma [J45.909]  Yes    Hypertension, essential, benign [I10]  Yes    Hypothyroidism [E03.9]  Yes    Obesity (BMI 30-39.9) [E66.9]  Yes      Resolved Hospital Problems   No resolved problems to display.         Thank you for allowing us to participate in the care of your patient. We will follow the patient and provide recommendations as needed.    Patient care time was spent personally by me on the following activities: > 35 minutes  Obtaining a history.  Examination of patient.  Providing medical care at the patients bedside.  Developing a treatment plan with patient or surrogate and bedside caregivers.  Ordering and reviewing laboratory studies, radiographic studies, pulse oximetry.  Ordering and performing treatments and interventions.  Evaluation of patient's response to treatment.  Discussions with consultants while on the unit and immediately available to the  patient.  Re-evaluation of the patient's condition.  Documentation in the medical record.      Beltran Stout MD

## 2024-12-31 NOTE — ASSESSMENT & PLAN NOTE
- moderate pericardial effusion on 2D echocardiogram 12/28   - Clinically not in tamponade physiology  - asymptomatic, vitals stable  - cardiology following  - caution with IV diuresis    Repeat echo 12/30 - now only has trivial effusion, significant improvement  Stable to transfer out of ICU

## 2024-12-31 NOTE — ASSESSMENT & PLAN NOTE
Patient has paroxysmal (<7 days) atrial fibrillation. Patient is currently in atrial fibrillation. [id number] Score: 4. The patients heart rate in the last 24 hours is as follows:  Pulse  Min: 61  Max: 82     Antiarrhythmics  metoprolol tartrate (LOPRESSOR) split tablet 12.5 mg, 2 times daily, Oral    Anticoagulants  apixaban tablet 5 mg, 2 times daily, Oral    Plan  - Replete lytes with a goal of K>4, Mg >2  - Patient is anticoagulated, see medications listed above.  - Patient's afib is currently uncontrolled. Will continue current treatment  - Underwent cardioversion on 11/26/2024 with conversion to NSR, was sent home on amio 400 mg BID for 10 days then 200 mg daily afterwards, says she has been compliant. Was also told to resume Toprol and eliquis.  - Initiated on diltiazem gtt in the ED  - Cardiology consulted  - cardioversion 12/30, diltiazem drip stopped, low-dose beta blocker started given reduced EF  - heart rates well controlled, continues to be in sinus rhythm

## 2024-12-31 NOTE — PROGRESS NOTES
[facility] Medicine  Progress Note    Patient Name: Niurka Patel  MRN: [id number]  Patient Class: IP- Inpatient   Admission Date: 12/27/2024  Length of Stay: 3 days  Attending Physician: Ani Moss, *  Primary Care Provider: Елена Barron NP        Subjective     Principal Problem:Atrial fibrillation with RVR        HPI:  Ms. Patel is an 84 yr old female with a hx of with RVR, asthma, CHF, HTN, hypothyroidism, obesity, CVA, HLD, PE, tremors who presented to the ED with a chief complaint of palpitations.  Upon talking to patient she actually denies ever feeling palpitations or any, fluttering in her chest.  She states that over the last 4 weeks she has had increased generalized weakness and fatigue.  She also endorses productive cough with white sputum, shortness of breath, and leg swelling.  She states that her home health nurse came to check on her today and noticed that she seemed very wobbly while using her cane and advised her to come to the emergency room for further evaluation.  Patient states that she was in AFib about 1 month ago and had cardioversion on 11/26/2024 with successful conversion to normal sinus rhythm.  Per chart review, she was discharged on amiodarone 400 mg b.i.d. for 10 days then 200 mg daily afterwards.  She was also told to continue taking her metoprolol and Eliquis.  She also endorses a history of CHF denies PND and orthopnea.  Denies any recent sick contacts or home oxygen use.  Endorses tobacco use and smokes 1 pack of cigarettes a day and denies alcohol and recreational drug use.  She denies fever, chills, palpitations, diaphoresis, chest pain, abdominal pain, nausea, vomiting, diarrhea, dysuria, hematuria, lightheadedness, dizziness, and syncope.    Upon arrival to ED, patient afebrile, HR of 122, RR of 18, BP of 103/74, satting 97% on RA.  Workup in the ED revealed sodium of 124, chloride of 93, BNP of 156.  CXR with cardiomegaly.  Scattered  linear and ground-glass opacities in the mid lower lung zones.  Blunting of the costophrenic angles compatible mall components of pleural fluid.  Questionable nodular opacity in the right upper lobe near the apex.  Follow-up plain radiographs are recommended.  Patient was given aspirin 325 mg oral and initiated on diltiazem titrating gtt while in the ED. case discussed with ED provider and patient will be placed under observation for further management.    Overview/Hospital Course:  Patient was admitted for atrial fibrillation in R and had hyponatremia.  Patient was started on diltiazem drip.  Cardiology was consulted.  Cardioversion planned for 12/29.  Electrolytes repleted.  For hyponatremia, HCTZ was stopped. Lasix was given with caution for CHF.  Nephrology closely followed the patient.  At presentation, TSH was elevated but free T4 was normal, held off increasing Synthroid given active AFib with heart rates 130-140.  2D echocardiogram showed moderate pericardial effusion, but clinically she was not in tamponade.  At pressures were stable on diltiazem drip.  Successful cardioversion to sinus rhythm with 2 shocks of 200 joules.  Diltiazem drip stopped and patient was started on low-dose beta blocker given EF reduced to 40-45% on 2D echocardiogram.  Sodium level improved, she responded well to IV Lasix. HR stable with beta blocker. Repeat ECHO ordered to monitor for pericardial effusion, which showed significant improvement and now only has trivial effusion.  Labs today showed new CED but multiple numbers in her entire CBC and CMP were abnormal, repeat labs ordered and Lasix held to monitor repeat creatinine.    Interval History:  Patient is seen and examined at bedside this morning.      Repeat limited ECHO showed only trivial effusion.      Hemoglobin dropped to 8.4 from 13.6, creatinine increased to 1.3 from 0.5, multiple other lab abnormalities.  Looks like a lab error, we will repeat both CBC and BMP.   Discontinue IV Lasix still then for concern of new CED.    Stable in sinus rhythm since cardioversion.      No chest pain, no palpitations, no dizziness, no shortness of breath, she mentioned today she feels much better.  Review of Systems  Objective:     Vital Signs (Most Recent):  Temp: 97.6 °F (36.4 °C) (12/31/24 1301)  Pulse: 74 (12/31/24 1501)  Resp: (!) 21 (12/31/24 1501)  BP: 119/60 (12/31/24 1500)  SpO2: (!) 93 % (12/31/24 1501) Vital Signs (24h Range):  Temp:  [97.5 °F (36.4 °C)-98.1 °F (36.7 °C)] 97.6 °F (36.4 °C)  Pulse:  [61-82] 74  Resp:  [14-35] 21  SpO2:  [92 %-100 %] 93 %  BP: (113-137)/(60-86) 119/60     Weight: 70.5 kg (155 lb 6.8 oz)  Body mass index is 26.68 kg/m².    Intake/Output Summary (Last 24 hours) at 12/31/2024 1520  Last data filed at 12/31/2024 1406  Gross per 24 hour   Intake 1197 ml   Output 300 ml   Net 897 ml         Physical Exam  Vitals reviewed.   Constitutional:       General: She is awake. She is not in acute distress.     Appearance: Normal appearance. She is not ill-appearing or diaphoretic.   Cardiovascular:      Rate and Rhythm: Normal rate and regular rhythm.      Heart sounds: Normal heart sounds.      No friction rub. No gallop.   Pulmonary:      Effort: Pulmonary effort is normal. No accessory muscle usage or respiratory distress.      Breath sounds: Rales (bibasilar) present. No wheezing (Diffuse expiratory).      Comments: Improving  Abdominal:      General: Abdomen is flat. Bowel sounds are normal.      Palpations: Abdomen is soft.      Tenderness: There is no abdominal tenderness. There is no guarding or rebound.   Musculoskeletal:      Right lower leg: Edema present.      Left lower leg: Edema present.   Skin:     General: Skin is warm and dry.   Neurological:      Mental Status: She is alert and oriented to person, place, and time.   Psychiatric:         Behavior: Behavior is cooperative.             Significant Labs: All pertinent labs within the past 24 hours  have been reviewed.    Significant Imaging: I have reviewed all pertinent imaging results/findings within the past 24 hours.    Assessment and Plan     * Atrial fibrillation with RVR  Patient has paroxysmal (<7 days) atrial fibrillation. Patient is currently in atrial fibrillation. [id number] Score: 4. The patients heart rate in the last 24 hours is as follows:  Pulse  Min: 61  Max: 82     Antiarrhythmics  metoprolol tartrate (LOPRESSOR) split tablet 12.5 mg, 2 times daily, Oral    Anticoagulants  apixaban tablet 5 mg, 2 times daily, Oral    Plan  - Replete lytes with a goal of K>4, Mg >2  - Patient is anticoagulated, see medications listed above.  - Patient's afib is currently uncontrolled. Will continue current treatment  - Underwent cardioversion on 11/26/2024 with conversion to NSR, was sent home on amio 400 mg BID for 10 days then 200 mg daily afterwards, says she has been compliant. Was also told to resume Toprol and eliquis.  - Initiated on diltiazem gtt in the ED  - Cardiology consulted  - cardioversion 12/30, diltiazem drip stopped, low-dose beta blocker started given reduced EF  - heart rates well controlled, continues to be in sinus rhythm    Pericardial effusion  - moderate pericardial effusion on 2D echocardiogram 12/28   - Clinically not in tamponade physiology  - asymptomatic, vitals stable  - cardiology following  - caution with IV diuresis    Repeat echo 12/30 - now only has trivial effusion, significant improvement  Stable to transfer out of ICU      Hyponatremia  The patient's most recent sodium results are listed below.  Recent Labs     12/30/24  1130 12/31/24  0511 12/31/24  1342   *  133* 134* 131*       Plan  - Correct the sodium by 4-6mEq in 24 hours.   - Urine sodium, urine osmolality, serum osmolality, AM cortisol, or TSH, T4.  - TSH elevated, Free T4 normal - needs repeat labs with synthyroid  - hold IV fluids and reduced ejection fraction  - Monitor sodium Daily.   - Patient  "hyponatremia is improving  - Nephrology closely following  - discontinue HCTZ at discharge  - IV lasix for diuresis with caution as tolerated  - monitor repeat BMP            Obesity (BMI 30-39.9)  Body mass index is 26.68 kg/m². Morbid obesity complicates all aspects of disease management from diagnostic modalities to treatment.        Hypothyroidism  - Continue home synthroid  - TSH elevated, Free T4 normal  - She will need repeat TFTs and Synthroid dose adjusting once heart rates improve.  - Repeat TSH to make sure this is not euthyroid sick syndrome, EPIC gives a hard stop        Hypertension, essential, benign  Latest blood pressure and vitals reviewed-   Temp:  [97.5 °F (36.4 °C)-98.1 °F (36.7 °C)]   Pulse:  [61-82]   Resp:  [14-35]   BP: (113-137)/(60-86)   SpO2:  [92 %-100 %] .     Home meds for hypertension were reviewed and noted below.   Hypertension Medications               furosemide (LASIX) 20 MG tablet Take 1 tablet every day by oral route as needed.    hydroCHLOROthiazide (MICROZIDE) 12.5 mg capsule Take 12.5 mg by mouth.    metoprolol tartrate (LOPRESSOR) 50 MG tablet Take 1 tablet by mouth 2 (two) times daily.          - Holding home HCTZ in the setting of hyponatremia  - Continue home metoprolol with parameters  - Lasix with caution based on BMP    Congestive heart failure  Patient has  hx of  heart failure that is Acute on chronic. On presentation their CHF was decompensated. Evidence of decompensated CHF on presentation includes: edema, dyspnea on exertion (FROST), and shortness of breath. Most recent BNP and echo results are listed below.      No results for input(s): "BNP" in the last 72 hours.    Latest ECHO  No results found for this or any previous visit.    Current Heart Failure Medications  furosemide injection 20 mg, Daily, Intravenous    Plan  - Monitor strict I&Os and daily weights.    - Place on telemetry  - Low sodium diet  - Place on fluid restriction of 1.5 L.   - Cardiology has " been consulted  - The patient's volume status is stable but not at their baseline as indicated by edema, crackles on lung auscultation, dyspnea on exertion (FROST), and shortness of breath  - on review of pt's last echo, revealed normal EF and undetermined diastolic function.  - Cardiology consulted, razia poole  - was on IV Lasix, was responding well  - hold hydrochlorothiazide with hyponatremia.    Lasix held given concern of new CED, restart depending on repeat labs        Asthma  - DuoNebs Q6H PRN  - prednisone 20 mg p.o. daily for minimal wheezing      VTE Risk Mitigation (From admission, onward)           Ordered     apixaban tablet 5 mg  2 times daily         12/27/24 1807     Reason for No Pharmacological VTE Prophylaxis  Once        Question:  Reasons:  Answer:  Already adequately anticoagulated on oral Anticoagulants    12/27/24 1748     IP VTE HIGH RISK PATIENT  Once         12/27/24 1748     Place sequential compression device  Until discontinued         12/27/24 1748                    Discharge Planning   CYNTHIA: 1/1/2025     Code Status: Full Code   Medical Readiness for Discharge Date:   Discharge Plan A: Home Health   Discharge Delays: Orders Needed                    Ani Moss MD  Department of Hospital Medicine   [facility]

## 2024-12-31 NOTE — SUBJECTIVE & OBJECTIVE
Interval History:  Patient is seen and examined at bedside this morning.      Repeat limited ECHO showed only trivial effusion.      Hemoglobin dropped to 8.4 from 13.6, creatinine increased to 1.3 from 0.5, multiple other lab abnormalities.  Looks like a lab error, we will repeat both CBC and BMP.  Discontinue IV Lasix still then for concern of new CED.    Stable in sinus rhythm since cardioversion.      No chest pain, no palpitations, no dizziness, no shortness of breath, she mentioned today she feels much better.  Review of Systems  Objective:     Vital Signs (Most Recent):  Temp: 97.6 °F (36.4 °C) (12/31/24 1301)  Pulse: 74 (12/31/24 1501)  Resp: (!) 21 (12/31/24 1501)  BP: 119/60 (12/31/24 1500)  SpO2: (!) 93 % (12/31/24 1501) Vital Signs (24h Range):  Temp:  [97.5 °F (36.4 °C)-98.1 °F (36.7 °C)] 97.6 °F (36.4 °C)  Pulse:  [61-82] 74  Resp:  [14-35] 21  SpO2:  [92 %-100 %] 93 %  BP: (113-137)/(60-86) 119/60     Weight: 70.5 kg (155 lb 6.8 oz)  Body mass index is 26.68 kg/m².    Intake/Output Summary (Last 24 hours) at 12/31/2024 1520  Last data filed at 12/31/2024 1406  Gross per 24 hour   Intake 1197 ml   Output 300 ml   Net 897 ml         Physical Exam  Vitals reviewed.   Constitutional:       General: She is awake. She is not in acute distress.     Appearance: Normal appearance. She is not ill-appearing or diaphoretic.   Cardiovascular:      Rate and Rhythm: Normal rate and regular rhythm.      Heart sounds: Normal heart sounds.      No friction rub. No gallop.   Pulmonary:      Effort: Pulmonary effort is normal. No accessory muscle usage or respiratory distress.      Breath sounds: Rales (bibasilar) present. No wheezing (Diffuse expiratory).      Comments: Improving  Abdominal:      General: Abdomen is flat. Bowel sounds are normal.      Palpations: Abdomen is soft.      Tenderness: There is no abdominal tenderness. There is no guarding or rebound.   Musculoskeletal:      Right lower leg: Edema present.       Left lower leg: Edema present.   Skin:     General: Skin is warm and dry.   Neurological:      Mental Status: She is alert and oriented to person, place, and time.   Psychiatric:         Behavior: Behavior is cooperative.             Significant Labs: All pertinent labs within the past 24 hours have been reviewed.    Significant Imaging: I have reviewed all pertinent imaging results/findings within the past 24 hours.

## 2024-12-31 NOTE — ASSESSMENT & PLAN NOTE
"Patient has  hx of  heart failure that is Acute on chronic. On presentation their CHF was decompensated. Evidence of decompensated CHF on presentation includes: edema, dyspnea on exertion (FROST), and shortness of breath. Most recent BNP and echo results are listed below.      No results for input(s): "BNP" in the last 72 hours.    Latest ECHO  No results found for this or any previous visit.    Current Heart Failure Medications  furosemide injection 20 mg, Daily, Intravenous    Plan  - Monitor strict I&Os and daily weights.    - Place on telemetry  - Low sodium diet  - Place on fluid restriction of 1.5 L.   - Cardiology has been consulted  - The patient's volume status is stable but not at their baseline as indicated by edema, crackles on lung auscultation, dyspnea on exertion (FROST), and shortness of breath  - on review of pt's last echo, revealed normal EF and undetermined diastolic function.  - Cardiology consulted, appreciate recs  - was on IV Lasix, was responding well  - hold hydrochlorothiazide with hyponatremia.    Lasix held given concern of new CED, restart depending on repeat labs      "

## 2024-12-31 NOTE — ASSESSMENT & PLAN NOTE
Latest blood pressure and vitals reviewed-   Temp:  [97.5 °F (36.4 °C)-98.1 °F (36.7 °C)]   Pulse:  [61-82]   Resp:  [14-35]   BP: (113-137)/(60-86)   SpO2:  [92 %-100 %] .     Home meds for hypertension were reviewed and noted below.   Hypertension Medications               furosemide (LASIX) 20 MG tablet Take 1 tablet every day by oral route as needed.    hydroCHLOROthiazide (MICROZIDE) 12.5 mg capsule Take 12.5 mg by mouth.    metoprolol tartrate (LOPRESSOR) 50 MG tablet Take 1 tablet by mouth 2 (two) times daily.          - Holding home HCTZ in the setting of hyponatremia  - Continue home metoprolol with parameters  - Lasix with caution based on BMP

## 2025-01-01 LAB
ANION GAP SERPL CALC-SCNC: 5 MMOL/L (ref 8–16)
BASOPHILS # BLD AUTO: 0.1 K/UL (ref 0–0.2)
BASOPHILS NFR BLD: 0.9 % (ref 0–1.9)
BUN SERPL-MCNC: 16 MG/DL (ref 8–23)
CALCIUM SERPL-MCNC: 9 MG/DL (ref 8.7–10.5)
CHLORIDE SERPL-SCNC: 96 MMOL/L (ref 95–110)
CO2 SERPL-SCNC: 31 MMOL/L (ref 23–29)
CREAT SERPL-MCNC: 0.5 MG/DL (ref 0.5–1.4)
DIFFERENTIAL METHOD BLD: ABNORMAL
EOSINOPHIL # BLD AUTO: 0.6 K/UL (ref 0–0.5)
EOSINOPHIL NFR BLD: 5.4 % (ref 0–8)
ERYTHROCYTE [DISTWIDTH] IN BLOOD BY AUTOMATED COUNT: 15 % (ref 11.5–14.5)
EST. GFR  (NO RACE VARIABLE): >60 ML/MIN/1.73 M^2
GLUCOSE SERPL-MCNC: 83 MG/DL (ref 70–110)
HCT VFR BLD AUTO: 36.7 % (ref 37–48.5)
HGB BLD-MCNC: 12.2 G/DL (ref 12–16)
IMM GRANULOCYTES # BLD AUTO: 0.06 K/UL (ref 0–0.04)
IMM GRANULOCYTES NFR BLD AUTO: 0.6 % (ref 0–0.5)
LYMPHOCYTES # BLD AUTO: 2.1 K/UL (ref 1–4.8)
LYMPHOCYTES NFR BLD: 19.8 % (ref 18–48)
MAGNESIUM SERPL-MCNC: 1.9 MG/DL (ref 1.6–2.6)
MCH RBC QN AUTO: 28.8 PG (ref 27–31)
MCHC RBC AUTO-ENTMCNC: 33.2 G/DL (ref 32–36)
MCV RBC AUTO: 87 FL (ref 82–98)
MONOCYTES # BLD AUTO: 1 K/UL (ref 0.3–1)
MONOCYTES NFR BLD: 8.9 % (ref 4–15)
NEUTROPHILS # BLD AUTO: 6.9 K/UL (ref 1.8–7.7)
NEUTROPHILS NFR BLD: 64.4 % (ref 38–73)
NRBC BLD-RTO: 0 /100 WBC
PHOSPHATE SERPL-MCNC: 3 MG/DL (ref 2.7–4.5)
PLATELET # BLD AUTO: 450 K/UL (ref 150–450)
PMV BLD AUTO: 8.8 FL (ref 9.2–12.9)
POTASSIUM SERPL-SCNC: 4.2 MMOL/L (ref 3.5–5.1)
RBC # BLD AUTO: 4.24 M/UL (ref 4–5.4)
SODIUM SERPL-SCNC: 132 MMOL/L (ref 136–145)
WBC # BLD AUTO: 10.74 K/UL (ref 3.9–12.7)

## 2025-01-01 PROCEDURE — 25000003 PHARM REV CODE 250: Performed by: FAMILY MEDICINE

## 2025-01-01 PROCEDURE — 25000003 PHARM REV CODE 250

## 2025-01-01 PROCEDURE — 99900035 HC TECH TIME PER 15 MIN (STAT)

## 2025-01-01 PROCEDURE — 36415 COLL VENOUS BLD VENIPUNCTURE: CPT

## 2025-01-01 PROCEDURE — 99900031 HC PATIENT EDUCATION (STAT)

## 2025-01-01 PROCEDURE — 80048 BASIC METABOLIC PNL TOTAL CA: CPT

## 2025-01-01 PROCEDURE — 83735 ASSAY OF MAGNESIUM: CPT

## 2025-01-01 PROCEDURE — 63600175 PHARM REV CODE 636 W HCPCS

## 2025-01-01 PROCEDURE — 63600175 PHARM REV CODE 636 W HCPCS: Performed by: FAMILY MEDICINE

## 2025-01-01 PROCEDURE — 25000003 PHARM REV CODE 250: Performed by: STUDENT IN AN ORGANIZED HEALTH CARE EDUCATION/TRAINING PROGRAM

## 2025-01-01 PROCEDURE — 27000221 HC OXYGEN, UP TO 24 HOURS

## 2025-01-01 PROCEDURE — 21400001 HC TELEMETRY ROOM

## 2025-01-01 PROCEDURE — 84100 ASSAY OF PHOSPHORUS: CPT

## 2025-01-01 PROCEDURE — 94761 N-INVAS EAR/PLS OXIMETRY MLT: CPT

## 2025-01-01 PROCEDURE — 99233 SBSQ HOSP IP/OBS HIGH 50: CPT | Mod: ,,, | Performed by: INTERNAL MEDICINE

## 2025-01-01 PROCEDURE — 93010 ELECTROCARDIOGRAM REPORT: CPT | Mod: ,,, | Performed by: INTERNAL MEDICINE

## 2025-01-01 PROCEDURE — 85025 COMPLETE CBC W/AUTO DIFF WBC: CPT

## 2025-01-01 PROCEDURE — 93005 ELECTROCARDIOGRAM TRACING: CPT | Performed by: INTERNAL MEDICINE

## 2025-01-01 RX ORDER — DIGOXIN 0.25 MG/ML
125 INJECTION INTRAMUSCULAR; INTRAVENOUS ONCE
Status: COMPLETED | OUTPATIENT
Start: 2025-01-01 | End: 2025-01-01

## 2025-01-01 RX ORDER — METOPROLOL TARTRATE 1 MG/ML
5 INJECTION, SOLUTION INTRAVENOUS ONCE
Status: COMPLETED | OUTPATIENT
Start: 2025-01-01 | End: 2025-01-01

## 2025-01-01 RX ORDER — DIGOXIN 0.25 MG/ML
125 INJECTION INTRAMUSCULAR; INTRAVENOUS
Status: DISCONTINUED | OUTPATIENT
Start: 2025-01-01 | End: 2025-01-02

## 2025-01-01 RX ORDER — METOPROLOL TARTRATE 25 MG/1
12.5 TABLET ORAL 2 TIMES DAILY
Status: DISCONTINUED | OUTPATIENT
Start: 2025-01-01 | End: 2025-01-03

## 2025-01-01 RX ORDER — DILTIAZEM HYDROCHLORIDE 5 MG/ML
5 INJECTION INTRAVENOUS ONCE
Status: COMPLETED | OUTPATIENT
Start: 2025-01-01 | End: 2025-01-01

## 2025-01-01 RX ADMIN — DEXTROSE MONOHYDRATE 5 MG/HR: 50 INJECTION, SOLUTION INTRAVENOUS at 12:01

## 2025-01-01 RX ADMIN — METOROPROLOL TARTRATE 5 MG: 5 INJECTION, SOLUTION INTRAVENOUS at 05:01

## 2025-01-01 RX ADMIN — DIGOXIN 125 MCG: 0.25 INJECTION INTRAMUSCULAR; INTRAVENOUS at 08:01

## 2025-01-01 RX ADMIN — DILTIAZEM HYDROCHLORIDE 5 MG: 5 INJECTION INTRAVENOUS at 05:01

## 2025-01-01 RX ADMIN — PREGABALIN 100 MG: 75 CAPSULE ORAL at 08:01

## 2025-01-01 RX ADMIN — LEVOTHYROXINE SODIUM 112 MCG: 112 TABLET ORAL at 05:01

## 2025-01-01 RX ADMIN — APIXABAN 5 MG: 5 TABLET, FILM COATED ORAL at 08:01

## 2025-01-01 RX ADMIN — PRAVASTATIN SODIUM 10 MG: 10 TABLET ORAL at 08:01

## 2025-01-01 RX ADMIN — PREDNISONE 20 MG: 20 TABLET ORAL at 08:01

## 2025-01-01 RX ADMIN — METOPROLOL TARTRATE 12.5 MG: 25 TABLET, FILM COATED ORAL at 08:01

## 2025-01-01 RX ADMIN — DEXTROSE MONOHYDRATE 10 MG/HR: 50 INJECTION, SOLUTION INTRAVENOUS at 09:01

## 2025-01-01 NOTE — SUBJECTIVE & OBJECTIVE
Interval History:  Patient is seen and examined, acute complaints    Review of Systems   Respiratory:  Negative for chest tightness and shortness of breath.    Cardiovascular:  Negative for chest pain, palpitations and leg swelling.   Gastrointestinal:  Negative for abdominal distention.     Objective:     Vital Signs (Most Recent):  Temp: 97.7 °F (36.5 °C) (01/01/25 1245)  Pulse: (!) 118 (01/01/25 1245)  Resp: 18 (01/01/25 1245)  BP: 110/74 (01/01/25 1245)  SpO2: (!) 94 % (01/01/25 1245) Vital Signs (24h Range):  Temp:  [97.4 °F (36.3 °C)-98.2 °F (36.8 °C)] 97.7 °F (36.5 °C)  Pulse:  [] 118  Resp:  [16-24] 18  SpO2:  [87 %-98 %] 94 %  BP: ()/(63-88) 110/74     Weight: 69.1 kg (152 lb 5.4 oz)  Body mass index is 26.15 kg/m².    Intake/Output Summary (Last 24 hours) at 1/1/2025 1539  Last data filed at 1/1/2025 1511  Gross per 24 hour   Intake 480 ml   Output 100 ml   Net 380 ml         Physical Exam  Vitals reviewed.   Constitutional:       General: She is awake. She is not in acute distress.     Appearance: Normal appearance. She is not ill-appearing or diaphoretic.   Cardiovascular:      Rate and Rhythm: Normal rate and regular rhythm.      Heart sounds: Normal heart sounds.      No friction rub. No gallop.   Pulmonary:      Effort: Pulmonary effort is normal. No accessory muscle usage or respiratory distress.      Breath sounds: Rales (bibasilar) present. No wheezing (Diffuse expiratory).      Comments: Improving  Abdominal:      General: Abdomen is flat. Bowel sounds are normal.      Palpations: Abdomen is soft.      Tenderness: There is no abdominal tenderness. There is no guarding or rebound.   Musculoskeletal:      Right lower leg: Edema present.      Left lower leg: Edema present.   Skin:     General: Skin is warm and dry.   Neurological:      Mental Status: She is alert and oriented to person, place, and time.   Psychiatric:         Behavior: Behavior is cooperative.             Significant  Labs: All pertinent labs within the past 24 hours have been reviewed.    Significant Imaging: I have reviewed all pertinent imaging results/findings within the past 24 hours.

## 2025-01-01 NOTE — PROGRESS NOTES
Progress Note  Nephrology    Consult Requested By: Chon Shepard DO    Reason for Consult:   hyponatremia    SUBJECTIVE:     History of Present Illness:   83 y/o female patient presented to the ED with a chief complaint of palpitations, weakness, fatigue, cough, SOB, LE edema.  ED workup revealed sodium of 124, chloride of 93, BNP of 156. CXR with cardiomegaly. Scattered linear and ground-glass opacities in the mid lower lung zones. Blunting of the costophrenic angles compatible mall components of pleural fluid.  , pt was put on cardizem gtt.  Nephrology is consulted for hyponatremia.    12/28  Na+ the same, of note, pt on HCTZ at home.  Getting lasix only here.  BUN/Scr wnl.  TSH high.  ECHO done and report pending.  Na+ levels usually a little low w/baseline being around 134.  Breath sounds w/rhonchi and wheezing throughout, +LE edema.  denies SOB.    12/29  Na+ improving, urine osm 223, urine Na+ 53 .  ECHO w/EF 40-45%.  UA w/no cast, but 4+ glucose.  Low sp gravity.  Lungs sound much better today.  No new complaints.  12/30 VSS, on 2L NC, UOP 2.1L  12/31 VSS, on 2L NC, UOP 300cc  1/1 elevated HR with drop in BP this AM, on 2L NC, no UOP recorded    Assessment/plan:    CED  Hyponatremia, A/C  HTN/CHF/AF with RVR    --new CED- stopped IV lasix- now resolved  --serum Na stable/improved- continue to hold HCTZ indefinitely- fluid restriction 1.5L  --ok to resume jardiance at discharge   --BP dropped with rise in HR- AF management per cardiology  --s/p DCCV, on metoprolol, eliquis     Review of Systems:  As above     apixaban  5 mg Oral BID    furosemide (LASIX) injection  20 mg Intravenous Daily    levothyroxine  112 mcg Oral Before breakfast    metoprolol tartrate  12.5 mg Oral BID    pravastatin  10 mg Oral QHS    predniSONE  20 mg Oral Daily    pregabalin  100 mg Oral BID        OBJECTIVE:     Vital Signs Range (Last 24H):  Temp:  [97.4 °F (36.3 °C)-98.2 °F (36.8 °C)]   Pulse:  []   Resp:  [16-24]    BP: ()/(60-88)   SpO2:  [87 %-100 %]     Physical Exam:  General- NAD noted  HEENT- WNL  Neck- supple  CV- Regular rate and rhythm  Resp- +wheezing/rhonchi.  No increased WOB  GI- Non tender/non-distended, BS normoactive x4 quads  Extrem- No cyanosis, clubbing, +edema.  Derm- skin w/d  Neuro-  No flap.     Body mass index is 26.15 kg/m².    Laboratory:  CBC:   Recent Labs   Lab 01/01/25  0321   WBC 10.74   RBC 4.24   HGB 12.2   HCT 36.7*      MCV 87   MCH 28.8   MCHC 33.2     CMP:   Recent Labs   Lab 12/27/24  1600 12/27/24  2315 01/01/25  0321      < > 83   CALCIUM 9.1   < > 9.0   ALBUMIN 3.5  --   --    PROT 6.7  --   --    *   < > 132*   K 4.3   < > 4.2   CO2 26   < > 31*   CL 93*   < > 96   BUN 13   < > 16   CREATININE 0.6   < > 0.5   ALKPHOS 121  --   --    ALT 24  --   --    AST 35  --   --    BILITOT 0.6  --   --     < > = values in this interval not displayed.       Diagnostic Results:  Labs: Reviewed      ASSESSMENT/PLAN:     Active Hospital Problems    Diagnosis  POA    *Atrial fibrillation with RVR [I48.91]  Yes    Pericardial effusion [I31.39]  Yes    Hyponatremia [E87.1]  Yes    Congestive heart failure [I50.9]  Yes    Asthma [J45.909]  Yes    Hypertension, essential, benign [I10]  Yes    Hypothyroidism [E03.9]  Yes    Obesity (BMI 30-39.9) [E66.9]  Yes      Resolved Hospital Problems   No resolved problems to display.         Thank you for allowing us to participate in the care of your patient. We will follow the patient and provide recommendations as needed.    Patient care time was spent personally by me on the following activities: > 35 minutes  Obtaining a history.  Examination of patient.  Providing medical care at the patients bedside.  Developing a treatment plan with patient or surrogate and bedside caregivers.  Ordering and reviewing laboratory studies, radiographic studies, pulse oximetry.  Ordering and performing treatments and interventions.  Evaluation of  patient's response to treatment.  Discussions with consultants while on the unit and immediately available to the patient.  Re-evaluation of the patient's condition.  Documentation in the medical record.      Beltran Stout MD

## 2025-01-01 NOTE — ASSESSMENT & PLAN NOTE
Patient has paroxysmal (<7 days) atrial fibrillation. Patient is currently in atrial fibrillation. [id number] Score: 4. The patients heart rate in the last 24 hours is as follows:  Pulse  Min: 64  Max: 133     Antiarrhythmics  metoprolol tartrate (LOPRESSOR) split tablet 12.5 mg, 2 times daily, Oral  diltiaZEM 100 mg in dextrose 5% 100 mL IVPB (ready to mix) (non-titrating), Continuous, Intravenous    Anticoagulants  apixaban tablet 5 mg, 2 times daily, Oral    Plan  - Replete lytes with a goal of K>4, Mg >2  - Patient is anticoagulated, see medications listed above.  - Patient's afib is currently uncontrolled. Will continue current treatment  - Underwent cardioversion on 11/26/2024 with conversion to NSR, was sent home on amio 400 mg BID for 10 days then 200 mg daily afterwards, says she has been compliant. Was also told to resume Toprol and eliquis.  - Initiated on diltiazem gtt in the ED  - Cardiology consulted  - cardioversion 12/30, diltiazem drip stopped, low-dose beta blocker started given reduced EF  - restart diltiazem GTT per Cardiology for recurrence of RVR 1/1.

## 2025-01-01 NOTE — PROGRESS NOTES
[facility]  Progress Note    Patient Name: Niurka Patel  MRN: [id number]  Patient Class: IP- Inpatient   Admission Date: 12/27/2024  Length of Stay: 4 days  Attending Physician: Chon Shepard DO  Primary Care Provider: Елена Barron NP        Subjective     Principal Problem:Atrial fibrillation with RVR        HPI:  Ms. Patel is an 84 yr old female with a hx of with RVR, asthma, CHF, HTN, hypothyroidism, obesity, CVA, HLD, PE, tremors who presented to the ED with a chief complaint of palpitations.  Upon talking to patient she actually denies ever feeling palpitations or any, fluttering in her chest.  She states that over the last 4 weeks she has had increased generalized weakness and fatigue.  She also endorses productive cough with white sputum, shortness of breath, and leg swelling.  She states that her home health nurse came to check on her today and noticed that she seemed very wobbly while using her cane and advised her to come to the emergency room for further evaluation.  Patient states that she was in AFib about 1 month ago and had cardioversion on 11/26/2024 with successful conversion to normal sinus rhythm.  Per chart review, she was discharged on amiodarone 400 mg b.i.d. for 10 days then 200 mg daily afterwards.  She was also told to continue taking her metoprolol and Eliquis.  She also endorses a history of CHF denies PND and orthopnea.  Denies any recent sick contacts or home oxygen use.  Endorses tobacco use and smokes 1 pack of cigarettes a day and denies alcohol and recreational drug use.  She denies fever, chills, palpitations, diaphoresis, chest pain, abdominal pain, nausea, vomiting, diarrhea, dysuria, hematuria, lightheadedness, dizziness, and syncope.    Upon arrival to ED, patient afebrile, HR of 122, RR of 18, BP of 103/74, satting 97% on RA.  Workup in the ED revealed sodium of 124, chloride of 93, BNP of 156.  CXR with cardiomegaly.  Scattered  linear and ground-glass opacities in the mid lower lung zones.  Blunting of the costophrenic angles compatible mall components of pleural fluid.  Questionable nodular opacity in the right upper lobe near the apex.  Follow-up plain radiographs are recommended.  Patient was given aspirin 325 mg oral and initiated on diltiazem titrating gtt while in the ED. case discussed with ED provider and patient will be placed under observation for further management.    Overview/Hospital Course:  Patient was admitted for atrial fibrillation in R and had hyponatremia.  Patient was started on diltiazem drip.  Cardiology was consulted.  Cardioversion planned for 12/29.  Electrolytes repleted.  For hyponatremia, HCTZ was stopped. Lasix was given with caution for CHF.  Nephrology closely followed the patient.  At presentation, TSH was elevated but free T4 was normal, held off increasing Synthroid given active AFib with heart rates 130-140.  2D echocardiogram showed moderate pericardial effusion, but clinically she was not in tamponade.  At pressures were stable on diltiazem drip.  Successful cardioversion to sinus rhythm with 2 shocks of 200 joules.  Diltiazem drip stopped and patient was started on low-dose beta blocker given EF reduced to 40-45% on 2D echocardiogram.  Sodium level improved, she responded well to IV Lasix. HR stable with beta blocker. Repeat ECHO ordered to monitor for pericardial effusion, which showed significant improvement and now only has trivial effusion.  Labs today showed new CED but multiple numbers in her entire CBC and CMP were abnormal, repeat labs ordered and Lasix held to monitor repeat creatinine.  Patient went back into RVR 1/1, give metoprolol early and add digoxin and worked for 1 hour but back in RVR, start low-dose diltiazem drip per Cardiology, awaiting further recs.    Interval History:  Patient is seen and examined, acute complaints    Review of Systems   Respiratory:  Negative for chest  tightness and shortness of breath.    Cardiovascular:  Negative for chest pain, palpitations and leg swelling.   Gastrointestinal:  Negative for abdominal distention.     Objective:     Vital Signs (Most Recent):  Temp: 97.7 °F (36.5 °C) (01/01/25 1245)  Pulse: (!) 118 (01/01/25 1245)  Resp: 18 (01/01/25 1245)  BP: 110/74 (01/01/25 1245)  SpO2: (!) 94 % (01/01/25 1245) Vital Signs (24h Range):  Temp:  [97.4 °F (36.3 °C)-98.2 °F (36.8 °C)] 97.7 °F (36.5 °C)  Pulse:  [] 118  Resp:  [16-24] 18  SpO2:  [87 %-98 %] 94 %  BP: ()/(63-88) 110/74     Weight: 69.1 kg (152 lb 5.4 oz)  Body mass index is 26.15 kg/m².    Intake/Output Summary (Last 24 hours) at 1/1/2025 1539  Last data filed at 1/1/2025 1511  Gross per 24 hour   Intake 480 ml   Output 100 ml   Net 380 ml         Physical Exam  Vitals reviewed.   Constitutional:       General: She is awake. She is not in acute distress.     Appearance: Normal appearance. She is not ill-appearing or diaphoretic.   Cardiovascular:      Rate and Rhythm: Normal rate and regular rhythm.      Heart sounds: Normal heart sounds.      No friction rub. No gallop.   Pulmonary:      Effort: Pulmonary effort is normal. No accessory muscle usage or respiratory distress.      Breath sounds: Rales (bibasilar) present. No wheezing (Diffuse expiratory).      Comments: Improving  Abdominal:      General: Abdomen is flat. Bowel sounds are normal.      Palpations: Abdomen is soft.      Tenderness: There is no abdominal tenderness. There is no guarding or rebound.   Musculoskeletal:      Right lower leg: Edema present.      Left lower leg: Edema present.   Skin:     General: Skin is warm and dry.   Neurological:      Mental Status: She is alert and oriented to person, place, and time.   Psychiatric:         Behavior: Behavior is cooperative.             Significant Labs: All pertinent labs within the past 24 hours have been reviewed.    Significant Imaging: I have reviewed all pertinent  imaging results/findings within the past 24 hours.    Assessment and Plan     * Atrial fibrillation with RVR  Patient has paroxysmal (<7 days) atrial fibrillation. Patient is currently in atrial fibrillation. [id number] Score: 4. The patients heart rate in the last 24 hours is as follows:  Pulse  Min: 64  Max: 133     Antiarrhythmics  metoprolol tartrate (LOPRESSOR) split tablet 12.5 mg, 2 times daily, Oral  diltiaZEM 100 mg in dextrose 5% 100 mL IVPB (ready to mix) (non-titrating), Continuous, Intravenous    Anticoagulants  apixaban tablet 5 mg, 2 times daily, Oral    Plan  - Replete lytes with a goal of K>4, Mg >2  - Patient is anticoagulated, see medications listed above.  - Patient's afib is currently uncontrolled. Will continue current treatment  - Underwent cardioversion on 11/26/2024 with conversion to NSR, was sent home on amio 400 mg BID for 10 days then 200 mg daily afterwards, says she has been compliant. Was also told to resume Toprol and eliquis.  - Initiated on diltiazem gtt in the ED  - Cardiology consulted  - cardioversion 12/30, diltiazem drip stopped, low-dose beta blocker started given reduced EF  - restart diltiazem GTT per Cardiology for recurrence of RVR 1/1.    Pericardial effusion  - moderate pericardial effusion on 2D echocardiogram 12/28   - Clinically not in tamponade physiology  - asymptomatic, vitals stable  - cardiology following  - caution with IV diuresis    Repeat echo 12/30 - now only has trivial effusion, significant improvement  Stable to transfer out of ICU      Hyponatremia  The patient's most recent sodium results are listed below.  Recent Labs     12/30/24  1130 12/31/24  0511 12/31/24  1342   *  133* 134* 131*       Plan  - Correct the sodium by 4-6mEq in 24 hours.   - Urine sodium, urine osmolality, serum osmolality, AM cortisol, or TSH, T4.  - TSH elevated, Free T4 normal - needs repeat labs with synthyroid  - hold IV fluids and reduced ejection fraction  - Monitor  "sodium Daily.   - Patient hyponatremia is improving  - Nephrology closely following  - discontinue HCTZ at discharge  - IV lasix for diuresis with caution as tolerated  - monitor repeat BMP            Obesity (BMI 30-39.9)  Body mass index is 26.68 kg/m². Morbid obesity complicates all aspects of disease management from diagnostic modalities to treatment.        Hypothyroidism  - Continue home synthroid  - TSH elevated, Free T4 normal  - She will need repeat TFTs and Synthroid dose adjusting once heart rates improve.  - Repeat TSH to make sure this is not euthyroid sick syndrome, EPIC gives a hard stop        Hypertension, essential, benign  Latest blood pressure and vitals reviewed-   Temp:  [97.5 °F (36.4 °C)-98.1 °F (36.7 °C)]   Pulse:  [61-82]   Resp:  [14-35]   BP: (113-137)/(60-86)   SpO2:  [92 %-100 %] .     Home meds for hypertension were reviewed and noted below.   Hypertension Medications               furosemide (LASIX) 20 MG tablet Take 1 tablet every day by oral route as needed.    hydroCHLOROthiazide (MICROZIDE) 12.5 mg capsule Take 12.5 mg by mouth.    metoprolol tartrate (LOPRESSOR) 50 MG tablet Take 1 tablet by mouth 2 (two) times daily.          - Holding home HCTZ in the setting of hyponatremia  - Continue home metoprolol with parameters  - Lasix with caution based on BMP    Congestive heart failure  Patient has  hx of  heart failure that is Acute on chronic. On presentation their CHF was decompensated. Evidence of decompensated CHF on presentation includes: edema, dyspnea on exertion (FROST), and shortness of breath. Most recent BNP and echo results are listed below.      No results for input(s): "BNP" in the last 72 hours.    Latest ECHO  No results found for this or any previous visit.    Current Heart Failure Medications  furosemide injection 20 mg, Daily, Intravenous    Plan  - Monitor strict I&Os and daily weights.    - Place on telemetry  - Low sodium diet  - Place on fluid restriction of 1.5 " L.   - Cardiology has been consulted  - The patient's volume status is stable but not at their baseline as indicated by edema, crackles on lung auscultation, dyspnea on exertion (FROST), and shortness of breath  - on review of pt's last echo, revealed normal EF and undetermined diastolic function.  - Cardiology consulted, appreciate recs  - was on IV Lasix, was responding well  - hold hydrochlorothiazide with hyponatremia.    Lasix held given concern of new CED, restart depending on repeat labs        Asthma  - DuoNebs Q6H PRN  - prednisone 20 mg p.o. daily for minimal wheezing      VTE Risk Mitigation (From admission, onward)           Ordered     apixaban tablet 5 mg  2 times daily         12/27/24 1807     Reason for No Pharmacological VTE Prophylaxis  Once        Question:  Reasons:  Answer:  Already adequately anticoagulated on oral Anticoagulants    12/27/24 1748     IP VTE HIGH RISK PATIENT  Once         12/27/24 1748     Place sequential compression device  Until discontinued         12/27/24 1748                    Discharge Planning   CYNTHIA: 1/4/2025     Code Status: Full Code   Medical Readiness for Discharge Date:   Discharge Plan A: Home Health   Discharge Delays: Orders Needed      Treatment, consult recommendation, PT/OT, dispo planning.              Chon Shepard DO  Department of Hospital Medicine   [facility]

## 2025-01-01 NOTE — NURSING
0425 Nurse noticed patient's HR sustaining in the 120's-140's for several minutes. Patient was resting comfortable in bed with no distress or discomfort. Monitor shows patient is in afib. This nurse called the monitor room for a strip and EKG was obtained showing patient is currently in Afib with RVR. See chart for vital signs. Dr. Tello notified order for metoprolol 5mg IV given and carried out. Will continue to monitor.

## 2025-01-01 NOTE — NURSING
Patient remained in Afib with a HR in the 130's-140's. Dr. Tello notified orders given and carried out see MAR. Will continue to monitor

## 2025-01-01 NOTE — PLAN OF CARE
Problem: Adult Inpatient Plan of Care  Goal: Plan of Care Review  Outcome: Progressing  Goal: Patient-Specific Goal (Individualized)  Outcome: Progressing     Problem: Skin Injury Risk Increased  Goal: Skin Health and Integrity  Outcome: Progressing     Problem: Wound  Goal: Optimal Pain Control and Function  Outcome: Progressing  Goal: Skin Health and Integrity  Outcome: Progressing     Problem: Fall Injury Risk  Goal: Absence of Fall and Fall-Related Injury  Outcome: Progressing

## 2025-01-02 LAB
ANION GAP SERPL CALC-SCNC: 4 MMOL/L (ref 8–16)
BASOPHILS # BLD AUTO: 0.08 K/UL (ref 0–0.2)
BASOPHILS # BLD AUTO: 0.09 K/UL (ref 0–0.2)
BASOPHILS NFR BLD: 0.9 % (ref 0–1.9)
BASOPHILS NFR BLD: 0.9 % (ref 0–1.9)
BUN SERPL-MCNC: 14 MG/DL (ref 8–23)
CALCIUM SERPL-MCNC: 9.4 MG/DL (ref 8.7–10.5)
CHLORIDE SERPL-SCNC: 99 MMOL/L (ref 95–110)
CO2 SERPL-SCNC: 30 MMOL/L (ref 23–29)
CREAT SERPL-MCNC: 0.5 MG/DL (ref 0.5–1.4)
DIFFERENTIAL METHOD BLD: ABNORMAL
DIFFERENTIAL METHOD BLD: ABNORMAL
EOSINOPHIL # BLD AUTO: 0.5 K/UL (ref 0–0.5)
EOSINOPHIL # BLD AUTO: 0.6 K/UL (ref 0–0.5)
EOSINOPHIL NFR BLD: 5.6 % (ref 0–8)
EOSINOPHIL NFR BLD: 5.7 % (ref 0–8)
ERYTHROCYTE [DISTWIDTH] IN BLOOD BY AUTOMATED COUNT: 15 % (ref 11.5–14.5)
ERYTHROCYTE [DISTWIDTH] IN BLOOD BY AUTOMATED COUNT: 15.2 % (ref 11.5–14.5)
EST. GFR  (NO RACE VARIABLE): >60 ML/MIN/1.73 M^2
GLUCOSE SERPL-MCNC: 95 MG/DL (ref 70–110)
HCT VFR BLD AUTO: 37.7 % (ref 37–48.5)
HCT VFR BLD AUTO: 42.3 % (ref 37–48.5)
HGB BLD-MCNC: 12 G/DL (ref 12–16)
HGB BLD-MCNC: 13.6 G/DL (ref 12–16)
IMM GRANULOCYTES # BLD AUTO: 0.04 K/UL (ref 0–0.04)
IMM GRANULOCYTES # BLD AUTO: 0.05 K/UL (ref 0–0.04)
IMM GRANULOCYTES NFR BLD AUTO: 0.5 % (ref 0–0.5)
IMM GRANULOCYTES NFR BLD AUTO: 0.5 % (ref 0–0.5)
LYMPHOCYTES # BLD AUTO: 1.8 K/UL (ref 1–4.8)
LYMPHOCYTES # BLD AUTO: 2.3 K/UL (ref 1–4.8)
LYMPHOCYTES NFR BLD: 21 % (ref 18–48)
LYMPHOCYTES NFR BLD: 23.2 % (ref 18–48)
MAGNESIUM SERPL-MCNC: 2 MG/DL (ref 1.6–2.6)
MCH RBC QN AUTO: 27.9 PG (ref 27–31)
MCH RBC QN AUTO: 28.3 PG (ref 27–31)
MCHC RBC AUTO-ENTMCNC: 31.8 G/DL (ref 32–36)
MCHC RBC AUTO-ENTMCNC: 32.2 G/DL (ref 32–36)
MCV RBC AUTO: 87 FL (ref 82–98)
MCV RBC AUTO: 89 FL (ref 82–98)
MONOCYTES # BLD AUTO: 0.7 K/UL (ref 0.3–1)
MONOCYTES # BLD AUTO: 0.8 K/UL (ref 0.3–1)
MONOCYTES NFR BLD: 7.9 % (ref 4–15)
MONOCYTES NFR BLD: 7.9 % (ref 4–15)
NEUTROPHILS # BLD AUTO: 5.6 K/UL (ref 1.8–7.7)
NEUTROPHILS # BLD AUTO: 6.1 K/UL (ref 1.8–7.7)
NEUTROPHILS NFR BLD: 61.8 % (ref 38–73)
NEUTROPHILS NFR BLD: 64.1 % (ref 38–73)
NRBC BLD-RTO: 0 /100 WBC
NRBC BLD-RTO: 0 /100 WBC
PHOSPHATE SERPL-MCNC: 3.1 MG/DL (ref 2.7–4.5)
PLATELET # BLD AUTO: 345 K/UL (ref 150–450)
PLATELET # BLD AUTO: 517 K/UL (ref 150–450)
PMV BLD AUTO: 8.8 FL (ref 9.2–12.9)
PMV BLD AUTO: 9.5 FL (ref 9.2–12.9)
POTASSIUM SERPL-SCNC: 4.1 MMOL/L (ref 3.5–5.1)
RBC # BLD AUTO: 4.24 M/UL (ref 4–5.4)
RBC # BLD AUTO: 4.87 M/UL (ref 4–5.4)
SODIUM SERPL-SCNC: 133 MMOL/L (ref 136–145)
WBC # BLD AUTO: 8.73 K/UL (ref 3.9–12.7)
WBC # BLD AUTO: 9.9 K/UL (ref 3.9–12.7)

## 2025-01-02 PROCEDURE — 25000003 PHARM REV CODE 250: Performed by: FAMILY MEDICINE

## 2025-01-02 PROCEDURE — 85025 COMPLETE CBC W/AUTO DIFF WBC: CPT

## 2025-01-02 PROCEDURE — 21400001 HC TELEMETRY ROOM

## 2025-01-02 PROCEDURE — 25000003 PHARM REV CODE 250

## 2025-01-02 PROCEDURE — 99900031 HC PATIENT EDUCATION (STAT)

## 2025-01-02 PROCEDURE — 36415 COLL VENOUS BLD VENIPUNCTURE: CPT

## 2025-01-02 PROCEDURE — 63600175 PHARM REV CODE 636 W HCPCS: Performed by: INTERNAL MEDICINE

## 2025-01-02 PROCEDURE — 25000003 PHARM REV CODE 250: Performed by: INTERNAL MEDICINE

## 2025-01-02 PROCEDURE — 80048 BASIC METABOLIC PNL TOTAL CA: CPT

## 2025-01-02 PROCEDURE — 94761 N-INVAS EAR/PLS OXIMETRY MLT: CPT

## 2025-01-02 PROCEDURE — 84100 ASSAY OF PHOSPHORUS: CPT

## 2025-01-02 PROCEDURE — 27000221 HC OXYGEN, UP TO 24 HOURS

## 2025-01-02 PROCEDURE — 99900035 HC TECH TIME PER 15 MIN (STAT)

## 2025-01-02 PROCEDURE — 36415 COLL VENOUS BLD VENIPUNCTURE: CPT | Performed by: FAMILY MEDICINE

## 2025-01-02 PROCEDURE — 99221 1ST HOSP IP/OBS SF/LOW 40: CPT | Mod: ,,, | Performed by: FAMILY MEDICINE

## 2025-01-02 PROCEDURE — 83735 ASSAY OF MAGNESIUM: CPT

## 2025-01-02 PROCEDURE — 85025 COMPLETE CBC W/AUTO DIFF WBC: CPT | Mod: 91 | Performed by: FAMILY MEDICINE

## 2025-01-02 PROCEDURE — 63600175 PHARM REV CODE 636 W HCPCS

## 2025-01-02 PROCEDURE — 99233 SBSQ HOSP IP/OBS HIGH 50: CPT | Mod: ,,, | Performed by: INTERNAL MEDICINE

## 2025-01-02 RX ORDER — DILTIAZEM HYDROCHLORIDE 60 MG/1
60 TABLET, FILM COATED ORAL ONCE
Status: COMPLETED | OUTPATIENT
Start: 2025-01-02 | End: 2025-01-02

## 2025-01-02 RX ORDER — DIGOXIN 125 MCG
0.12 TABLET ORAL DAILY
Status: DISCONTINUED | OUTPATIENT
Start: 2025-01-02 | End: 2025-01-03

## 2025-01-02 RX ORDER — DILTIAZEM HYDROCHLORIDE 60 MG/1
60 TABLET, FILM COATED ORAL EVERY 6 HOURS
Status: DISCONTINUED | OUTPATIENT
Start: 2025-01-02 | End: 2025-01-04

## 2025-01-02 RX ORDER — FUROSEMIDE 20 MG/1
20 TABLET ORAL DAILY
Status: DISCONTINUED | OUTPATIENT
Start: 2025-01-02 | End: 2025-01-04

## 2025-01-02 RX ADMIN — FUROSEMIDE 20 MG: 20 TABLET ORAL at 12:01

## 2025-01-02 RX ADMIN — PRAVASTATIN SODIUM 10 MG: 10 TABLET ORAL at 09:01

## 2025-01-02 RX ADMIN — LEVOTHYROXINE SODIUM 112 MCG: 112 TABLET ORAL at 05:01

## 2025-01-02 RX ADMIN — AMIODARONE HYDROCHLORIDE 0.5 MG/MIN: 1.8 INJECTION, SOLUTION INTRAVENOUS at 09:01

## 2025-01-02 RX ADMIN — PREGABALIN 100 MG: 75 CAPSULE ORAL at 09:01

## 2025-01-02 RX ADMIN — DILTIAZEM HYDROCHLORIDE 60 MG: 60 TABLET, FILM COATED ORAL at 02:01

## 2025-01-02 RX ADMIN — METOPROLOL TARTRATE 12.5 MG: 25 TABLET, FILM COATED ORAL at 09:01

## 2025-01-02 RX ADMIN — DEXTROSE MONOHYDRATE 10 MG/HR: 50 INJECTION, SOLUTION INTRAVENOUS at 08:01

## 2025-01-02 RX ADMIN — PREGABALIN 100 MG: 75 CAPSULE ORAL at 08:01

## 2025-01-02 RX ADMIN — PREDNISONE 20 MG: 20 TABLET ORAL at 08:01

## 2025-01-02 RX ADMIN — DIGOXIN 0.12 MG: 125 TABLET ORAL at 12:01

## 2025-01-02 RX ADMIN — DILTIAZEM HYDROCHLORIDE 5 MG/HR: 100 INJECTION, POWDER, LYOPHILIZED, FOR SOLUTION INTRAVENOUS at 04:01

## 2025-01-02 RX ADMIN — METOPROLOL TARTRATE 12.5 MG: 25 TABLET, FILM COATED ORAL at 08:01

## 2025-01-02 RX ADMIN — SODIUM CHLORIDE 500 ML: 9 INJECTION, SOLUTION INTRAVENOUS at 09:01

## 2025-01-02 RX ADMIN — ACETAMINOPHEN 650 MG: 325 TABLET ORAL at 01:01

## 2025-01-02 RX ADMIN — AMIODARONE HYDROCHLORIDE 1 MG/MIN: 1.8 INJECTION, SOLUTION INTRAVENOUS at 01:01

## 2025-01-02 RX ADMIN — APIXABAN 5 MG: 5 TABLET, FILM COATED ORAL at 08:01

## 2025-01-02 RX ADMIN — AMIODARONE HYDROCHLORIDE 1 MG/MIN: 1.8 INJECTION, SOLUTION INTRAVENOUS at 07:01

## 2025-01-02 RX ADMIN — APIXABAN 5 MG: 5 TABLET, FILM COATED ORAL at 09:01

## 2025-01-02 NOTE — ASSESSMENT & PLAN NOTE
The patient's most recent sodium results are listed below.  Recent Labs     12/31/24  1342 01/01/25  0321 01/02/25  0611   * 132* 133*       Plan  - Correct the sodium by 4-6mEq in 24 hours.   - Urine sodium, urine osmolality, serum osmolality, AM cortisol, or TSH, T4.  - TSH elevated, Free T4 normal - needs repeat labs with synthyroid  - hold IV fluids and reduced ejection fraction  - Monitor sodium Daily.   - Patient hyponatremia is improving  - Nephrology closely following  - discontinue HCTZ at discharge  - IV lasix for diuresis with caution as tolerated  - monitor repeat BMP

## 2025-01-02 NOTE — PT/OT/SLP PROGRESS
Physical Therapy      Patient Name:  Niurka Patel   MRN:  [id number]    Patient not seen today secondary to Nurse/ SANDRA hold (RN hold from [facility]). Will follow-up 1/3/25.

## 2025-01-02 NOTE — NURSING
1800-Pt's HR range from . Pt currently in A-fib RVR. Notified Dr. Shepard and Dr. Santos. Pt received a one time dose of digoxin 125 mcg and metoprolol 12.5mg at 830 this morning. HR stayed in the 130s after meds were given. She was then started on a non- titrating 5 mg/hr cardizem drip and now 10 mg/hr. Pt shows no signs of distress and states no chest pain or shortness of breath. Pt is currently on 10 mg/hr cardizem drip. Last bp 101/71 Hr 110-130.

## 2025-01-02 NOTE — ASSESSMENT & PLAN NOTE
Patient has paroxysmal (<7 days) atrial fibrillation. Patient is currently in atrial fibrillation. [id number] Score: 4. The patients heart rate in the last 24 hours is as follows:  Pulse  Min: 97  Max: 141     Antiarrhythmics  metoprolol tartrate (LOPRESSOR) split tablet 12.5 mg, 2 times daily, Oral  diltiaZEM tablet 60 mg, Every 6 hours, Oral    Anticoagulants  apixaban tablet 5 mg, 2 times daily, Oral    Plan  - Replete lytes with a goal of K>4, Mg >2  - Patient is anticoagulated, see medications listed above.  - Patient's afib is currently uncontrolled. Will continue current treatment  - Underwent cardioversion on 11/26/2024 with conversion to NSR, was sent home on amio 400 mg BID for 10 days then 200 mg daily afterwards, says she has been compliant. Was also told to resume Toprol and eliquis.  - Initiated on diltiazem gtt in the ED  - Cardiology consulted  - cardioversion 12/30, diltiazem drip stopped, low-dose beta blocker started given reduced EF  - able to wean off diltiazem and amiodarone, now on digoxin and metoprolol, further recs per cardiology

## 2025-01-02 NOTE — PROGRESS NOTES
[facility] hospital  Department of Cardiology  Consult Note      PATIENT NAME: Niurka Patel    MRN: [id number]  TODAY'S DATE: 01/02/2025  ADMIT DATE: 12/27/2024                          CONSULT REQUESTED BY: Chon Shepard DO    SUBJECTIVE     PRINCIPAL PROBLEM: Atrial fibrillation with RVR    INTERVAL HISTORY    1/2/25    Resting in bed. NAD. Denies complaints.  Remains on cardizem and amiodarone gtt.      01/01/2025:  Patient went into AFib with RVR again.  Started on Cardizem drip.    12/30/24    Resting in chair. NAD. 2L NC. S/p RODNEY/CV yest. Remains in SR.  Denies complaints. VSS, labs reviewed, stable. -1.4 L yest. Wheezing present.  Repeat echo stable.  Trivial effusion        HPI:    Ms. Patel is an 84 year old female who presented to the ER with complaints of palpitations. She has a known hx of PAF and was cardioverted about a month ago. She was found to be in Afib with RVR on arrival  as well as some evidence of CHF.  Patient seen resting in bed with no acute distress noted.  Seen lying relatively comfortably near flat.  She has been seen by Nephrology for hyponatremia.  Patient had been on amiodarone outpatient is noted to be hypothyroid and thus amiodarone has not been continued.      REASON FOR CONSULT:  From Hospitalist H&P: Ms. Patel is an 84 yr old female with a hx of with RVR, asthma, CHF, HTN, hypothyroidism, obesity, CVA, HLD, PE, tremors who presented to the ED with a chief complaint of palpitations.  Upon talking to patient she actually denies ever feeling palpitations or any, fluttering in her chest.  She states that over the last 4 weeks she has had increased generalized weakness and fatigue.  She also endorses productive cough with white sputum, shortness of breath, and leg swelling.  She states that her home health nurse came to check on her today and noticed that she seemed very wobbly while using her cane and advised her to come to the emergency room for further evaluation.   Patient states that she was in AFib about 1 month ago and had cardioversion on 11/26/2024 with successful conversion to normal sinus rhythm.  Per chart review, she was discharged on amiodarone 400 mg b.i.d. for 10 days then 200 mg daily afterwards.  She was also told to continue taking her metoprolol and Eliquis.  She also endorses a history of CHF denies PND and orthopnea.  Denies any recent sick contacts or home oxygen use.  Endorses tobacco use and smokes 1 pack of cigarettes a day and denies alcohol and recreational drug use.  She denies fever, chills, palpitations, diaphoresis, chest pain, abdominal pain, nausea, vomiting, diarrhea, dysuria, hematuria, lightheadedness, dizziness, and syncope.     Upon arrival to ED, patient afebrile, HR of 122, RR of 18, BP of 103/74, satting 97% on RA.  Workup in the ED revealed sodium of 124, chloride of 93, BNP of 156.  CXR with cardiomegaly.  Scattered linear and ground-glass opacities in the mid lower lung zones.  Blunting of the costophrenic angles compatible mall components of pleural fluid.  Questionable nodular opacity in the right upper lobe near the apex.  Follow-up plain radiographs are recommended.  Patient was given aspirin 325 mg oral and initiated on diltiazem titrating gtt while in the ED. case discussed with ED provider and patient will be placed under observation for further management.     No past medical history on file.     No past surgical history on file.     Review of patient's allergies indicates:  No Known Allergies     No current facility-administered medications on file prior to encounter.         Review of patient's allergies indicates:  No Known Allergies    No past medical history on file.  Past Surgical History:   Procedure Laterality Date    CARDIOVERSION  12/29/2024           REVIEW OF SYSTEMS  Per HPI    OBJECTIVE     VITAL SIGNS (Most Recent)  Temp: 97.3 °F (36.3 °C) (01/02/25 1156)  Pulse: (!) 115 (01/02/25 1226)  Resp: 17 (01/02/25  0703)  BP: 118/80 (01/02/25 1455)  SpO2: 95 % (01/02/25 1156)    VENTILATION STATUS  Resp: 17 (01/02/25 0703)  SpO2: 95 % (01/02/25 1156)           I & O (Last 24H):  Intake/Output Summary (Last 24 hours) at 1/2/2025 1520  Last data filed at 1/2/2025 1427  Gross per 24 hour   Intake 790.11 ml   Output 2850 ml   Net -2059.89 ml       WEIGHTS  Wt Readings from Last 1 Encounters:   01/02/25 0438 69.4 kg (153 lb)   12/31/24 2123 69.1 kg (152 lb 5.4 oz)   12/30/24 0514 70.5 kg (155 lb 6.8 oz)   12/29/24 0410 70.9 kg (156 lb 4.9 oz)   12/28/24 0530 73.7 kg (162 lb 7.7 oz)   12/27/24 1528 81.6 kg (180 lb)       PHYSICAL EXAM  CONSTITUTIONAL: NAD  HEENT: Normocephalic               NECK:  No JVD   CVS: S1S2+, iRRR  LUNGS: Clear  ABDOMEN: nondistended   EXTREMITIES: mild edema   NEURO: AAO X 3  PSY: Normal affect      HOME MEDICATIONS:  No current facility-administered medications on file prior to encounter.     Current Outpatient Medications on File Prior to Encounter   Medication Sig Dispense Refill    atorvastatin (LIPITOR) 10 MG tablet Take 10 mg by mouth once daily.      amiodarone (PACERONE) 200 MG Tab Take 1 tablet by mouth once daily.      azelastine (ASTELIN) 137 mcg (0.1 %) nasal spray 1 spray (137 mcg total) by Nasal route 2 (two) times daily as needed for Rhinitis. 30 mL 0    benzocaine-menthoL 15-3.6 mg Lozg 1 each by Mucous Membrane route every 4 (four) hours as needed. 18 lozenge 0    benzocaine-menthoL 6-10 mg lozenge Take 1 lozenge by mouth every 2 (two) hours as needed (Sore Throat). 18 tablet 0    cetirizine (ZYRTEC) 10 MG tablet Take 10 mg by mouth.      ELIQUIS 5 mg Tab Take 5 mg by mouth 2 (two) times daily.      ergocalciferol (ERGOCALCIFEROL) 50,000 unit Cap TAKE ONE CAPSULE BY MOUTH EVERY 30 DAYS      furosemide (LASIX) 20 MG tablet Take 1 tablet every day by oral route as needed.      guaiFENesin (MUCINEX) 600 mg 12 hr tablet Take 2 tablets (1,200 mg total) by mouth 2 (two) times daily. 30 tablet 0     hydroCHLOROthiazide (MICROZIDE) 12.5 mg capsule Take 12.5 mg by mouth.      hydrOXYzine HCL (ATARAX) 25 MG tablet TAKE ONE TABLET BY MOUTH THREE TIMES DAILY FOR 5 DAYS      ibandronate (BONIVA) 150 mg tablet TAKE ONE TABLET BY MOUTH EVERY 30 DAYS IN THE MORNING      JARDIANCE 10 mg tablet Take 1 tablet by mouth once daily.      levocetirizine (XYZAL) 5 MG tablet Take 1 tablet (5 mg total) by mouth every evening. 30 tablet 0    metoprolol tartrate (LOPRESSOR) 50 MG tablet Take 1 tablet by mouth 2 (two) times daily.      pravastatin (PRAVACHOL) 10 MG tablet Take 1 tablet by mouth every evening.      pregabalin (LYRICA) 100 MG capsule Take 1 capsule by mouth 2 (two) times daily.      primidone (MYSOLINE) 50 MG Tab Take 5 tablets 3 times a day by oral route.      SYNTHROID 112 mcg tablet Take 1 tablet by mouth once daily.      triamcinolone acetonide 0.1% (KENALOG) 0.1 % cream APPLY A THIN LAYER TO THE AFFECTED AREA(S) BY TOPICAL ROUTE 2 TIMES PER DAY         SCHEDULED MEDS:   apixaban  5 mg Oral BID    digoxin  0.125 mg Oral Daily    diltiaZEM  60 mg Oral Q6H    furosemide  20 mg Oral Daily    levothyroxine  112 mcg Oral Before breakfast    metoprolol tartrate  12.5 mg Oral BID    pravastatin  10 mg Oral QHS    predniSONE  20 mg Oral Daily    pregabalin  100 mg Oral BID       CONTINUOUS INFUSIONS:          PRN MEDS:  Current Facility-Administered Medications:     acetaminophen, 650 mg, Oral, Q4H PRN    albuterol-ipratropium, 3 mL, Nebulization, Q4H PRN    aluminum-magnesium hydroxide-simethicone, 30 mL, Oral, QID PRN    dextrose 50%, 12.5 g, Intravenous, PRN    dextrose 50%, 25 g, Intravenous, PRN    glucagon (human recombinant), 1 mg, Intramuscular, PRN    glucose, 16 g, Oral, PRN    glucose, 24 g, Oral, PRN    magnesium oxide, 800 mg, Oral, PRN    magnesium oxide, 800 mg, Oral, PRN    melatonin, 6 mg, Oral, Nightly PRN    naloxone, 0.02 mg, Intravenous, PRN    ondansetron, 4 mg, Intravenous, Q6H PRN    potassium  "bicarbonate, 35 mEq, Oral, PRN    potassium bicarbonate, 50 mEq, Oral, PRN    potassium bicarbonate, 60 mEq, Oral, PRN    potassium, sodium phosphates, 2 packet, Oral, PRN    potassium, sodium phosphates, 2 packet, Oral, PRN    potassium, sodium phosphates, 2 packet, Oral, PRN    senna-docusate 8.6-50 mg, 1 tablet, Oral, BID PRN    sodium chloride 0.9%, 10 mL, Intravenous, Q12H PRN    LABS AND DIAGNOSTICS     CBC LAST 3 DAYS  Recent Labs   Lab 01/01/25  0321 01/02/25  0611 01/02/25  0945   WBC 10.74 9.90 8.73   RBC 4.24 4.87 4.24   HGB 12.2 13.6 12.0   HCT 36.7* 42.3 37.7   MCV 87 87 89   MCH 28.8 27.9 28.3   MCHC 33.2 32.2 31.8*   RDW 15.0* 15.0* 15.2*    517* 345   MPV 8.8* 8.8* 9.5   GRAN 64.4  6.9 61.8  6.1 64.1  5.6   LYMPH 19.8  2.1 23.2  2.3 21.0  1.8   MONO 8.9  1.0 7.9  0.8 7.9  0.7   BASO 0.10 0.09 0.08   NRBC 0 0 0       COAGULATION LAST 3 DAYS  Recent Labs   Lab 12/27/24  1600 12/28/24  0345   INR 1.1 1.1   APTT  --  41.6*       CHEMISTRY LAST 3 DAYS  Recent Labs   Lab 12/27/24  1600 12/27/24  2315 12/31/24  0511 12/31/24  1342 01/01/25  0321 01/02/25  0611   *   < > 134* 131* 132* 133*   K 4.3   < > 4.0 4.7 4.2 4.1   CL 93*   < > 98 94* 96 99   CO2 26   < > 27 32* 31* 30*   ANIONGAP 5*   < > 9 5* 5* 4*   BUN 13   < > 26* 16 16 14   CREATININE 0.6   < > 1.3 0.5 0.5 0.5      < > 108 189* 83 95   CALCIUM 9.1   < > 8.5* 9.6 9.0 9.4   MG 2.1   < > 1.5*  --  1.9 2.0   ALBUMIN 3.5  --   --   --   --   --    PROT 6.7  --   --   --   --   --    ALKPHOS 121  --   --   --   --   --    ALT 24  --   --   --   --   --    AST 35  --   --   --   --   --    BILITOT 0.6  --   --   --   --   --     < > = values in this interval not displayed.       CARDIAC PROFILE LAST 3 DAYS  Recent Labs   Lab 12/27/24  1600   *   TROPONINIHS 13.6       ENDOCRINE LAST 3 DAYS  Recent Labs   Lab 12/27/24  1600   TSH 10.384*       LAST ARTERIAL BLOOD GAS  ABG  No results for input(s): "PH", "PO2", "PCO2", " ""HCO3", "BE" in the last 168 hours.    LAST 7 DAYS MICROBIOLOGY   Microbiology Results (last 7 days)       ** No results found for the last 168 hours. **            MOST RECENT IMAGING  Echo    Left Ventricle: The left ventricle is normal in size. Normal wall   thickness. Normal wall motion. There is normal systolic function with a   visually estimated ejection fraction of 55 - 60%. There is normal   diastolic function.    Right Ventricle: Normal right ventricular cavity size. Wall thickness   is normal. Systolic function is normal.    Left Atrium: Left atrium is moderately dilated.    Right Atrium: Right atrium is moderately dilated.    Mitral Valve: There is mild regurgitation with a centrally directed   jet.    IVC/SVC: Normal venous pressure at 3 mmHg.      ECHOCARDIOGRAM RESULTS (last 5)  No results found for this or any previous visit.      CURRENT/PREVIOUS VISIT EKG  Results for orders placed or performed during the hospital encounter of 12/27/24   EKG 12-lead    Collection Time: 01/01/25  4:40 AM   Result Value Ref Range    QRS Duration 96 ms    [facility] QTC Calculation 491 ms    Narrative    Test Reason : R07.9,    Vent. Rate : 130 BPM     Atrial Rate :    BPM     P-R Int :    ms          QRS Dur :  96 ms      QT Int : 334 ms       P-R-T Axes :    111   2 degrees    QTcB Int : 491 ms    Atrial fibrillation with rapid ventricular response with premature  ventricular or aberrantly conducted complexes  Right axis deviation  Low voltage QRS  Incomplete right bundle branch block  Nonspecific ST and T wave abnormality  Abnormal ECG  No previous ECGs available    Referred By: AAAREFERRAL SELF           Confirmed By:            ASSESSMENT/PLAN:     Active Hospital Problems    Diagnosis    *Atrial fibrillation with RVR    Pericardial effusion    Hyponatremia    Congestive heart failure    Asthma    Hypertension, essential, benign    Hypothyroidism    Obesity (BMI 30-39.9)       ASSESSMENT & PLAN:     Afib with RVR s/p " successful DCCV 12/29.  Went back into AFib with RVR again  HTN  Hx of CVA  Smoker   Hypothyroidism  Pericardial effusion-improved      RECOMMENDATIONS:    Patient went back into AFib.  Had cardioversion 3 days ago.  Transition to PO Cardizem 60 mg q 6 hour.  Stop gtt 1 hour after PO dose.  She was cardioverted twice in the last 2 months and reverted back into AFib.  Continue digoxin 0.125 mg daily.  Continue metoprolol 12.5 mg BID  Received amio gtt this am. Will attempt to avoid with thyroid dysfunction. TSH 10.  Continue anticoagulation  Will follow  Benefits from outpatient EP evaluation for ablation      Linda Gonzales NP  Cardiology  Date of Service: 1/2/25

## 2025-01-02 NOTE — PROGRESS NOTES
[facility]  Department of Cardiology  Consult Note      PATIENT NAME: Niurka Patel    MRN: [id number]  TODAY'S DATE: 01/02/2025  ADMIT DATE: 12/27/2024                          CONSULT REQUESTED BY: Chon Shepard DO    SUBJECTIVE     PRINCIPAL PROBLEM: Atrial fibrillation with RVR    INTERVAL HISTORY      01/01/2025:  Patient went into AFib with RVR again.  Started on Cardizem drip.    12/30/24    Resting in chair. NAD. 2L NC. S/p RODNEY/CV yest. Remains in SR.  Denies complaints. VSS, labs reviewed, stable. -1.4 L yest. Wheezing present.  Repeat echo stable.  Trivial effusion        HPI:    Ms. Patel is an 84 year old female who presented to the ER with complaints of palpitations. She has a known hx of PAF and was cardioverted about a month ago. She was found to be in Afib with RVR on arrival  as well as some evidence of CHF.  Patient seen resting in bed with no acute distress noted.  Seen lying relatively comfortably near flat.  She has been seen by Nephrology for hyponatremia.  Patient had been on amiodarone outpatient is noted to be hypothyroid and thus amiodarone has not been continued.      REASON FOR CONSULT:  From Hospitalist H&P: Ms. Patel is an 84 yr old female with a hx of with RVR, asthma, CHF, HTN, hypothyroidism, obesity, CVA, HLD, PE, tremors who presented to the ED with a chief complaint of palpitations.  Upon talking to patient she actually denies ever feeling palpitations or any, fluttering in her chest.  She states that over the last 4 weeks she has had increased generalized weakness and fatigue.  She also endorses productive cough with white sputum, shortness of breath, and leg swelling.  She states that her home health nurse came to check on her today and noticed that she seemed very wobbly while using her cane and advised her to come to the emergency room for further evaluation.  Patient states that she was in AFib about 1 month ago and had cardioversion on 11/26/2024 with  successful conversion to normal sinus rhythm.  Per chart review, she was discharged on amiodarone 400 mg b.i.d. for 10 days then 200 mg daily afterwards.  She was also told to continue taking her metoprolol and Eliquis.  She also endorses a history of CHF denies PND and orthopnea.  Denies any recent sick contacts or home oxygen use.  Endorses tobacco use and smokes 1 pack of cigarettes a day and denies alcohol and recreational drug use.  She denies fever, chills, palpitations, diaphoresis, chest pain, abdominal pain, nausea, vomiting, diarrhea, dysuria, hematuria, lightheadedness, dizziness, and syncope.     Upon arrival to ED, patient afebrile, HR of 122, RR of 18, BP of 103/74, satting 97% on RA.  Workup in the ED revealed sodium of 124, chloride of 93, BNP of 156.  CXR with cardiomegaly.  Scattered linear and ground-glass opacities in the mid lower lung zones.  Blunting of the costophrenic angles compatible mall components of pleural fluid.  Questionable nodular opacity in the right upper lobe near the apex.  Follow-up plain radiographs are recommended.  Patient was given aspirin 325 mg oral and initiated on diltiazem titrating gtt while in the ED. case discussed with ED provider and patient will be placed under observation for further management.     No past medical history on file.     No past surgical history on file.     Review of patient's allergies indicates:  No Known Allergies     No current facility-administered medications on file prior to encounter.         Review of patient's allergies indicates:  No Known Allergies    No past medical history on file.  Past Surgical History:   Procedure Laterality Date    CARDIOVERSION  12/29/2024           REVIEW OF SYSTEMS  Per HPI    OBJECTIVE     VITAL SIGNS (Most Recent)  Temp: 97.3 °F (36.3 °C) (01/01/25 2344)  Pulse: (!) 124 (01/01/25 2344)  Resp: 17 (01/01/25 2344)  BP: 103/67 (79) (01/01/25 2344)  SpO2: (!) 94 % (01/01/25 2344)    VENTILATION STATUS  Resp: 17  (01/01/25 2344)  SpO2: (!) 94 % (01/01/25 2344)           I & O (Last 24H):  Intake/Output Summary (Last 24 hours) at 1/2/2025 0027  Last data filed at 1/1/2025 2159  Gross per 24 hour   Intake 480 ml   Output 650 ml   Net -170 ml       WEIGHTS  Wt Readings from Last 1 Encounters:   12/31/24 2123 69.1 kg (152 lb 5.4 oz)   12/30/24 0514 70.5 kg (155 lb 6.8 oz)   12/29/24 0410 70.9 kg (156 lb 4.9 oz)   12/28/24 0530 73.7 kg (162 lb 7.7 oz)   12/27/24 1528 81.6 kg (180 lb)       PHYSICAL EXAM  CONSTITUTIONAL: NAD  HEENT: Normocephalic               NECK:  No JVD   CVS: S1S2+, iRRR  LUNGS: Clear  ABDOMEN: nondistended   EXTREMITIES: mild edema   NEURO: AAO X 3  PSY: Normal affect      HOME MEDICATIONS:  No current facility-administered medications on file prior to encounter.     Current Outpatient Medications on File Prior to Encounter   Medication Sig Dispense Refill    atorvastatin (LIPITOR) 10 MG tablet Take 10 mg by mouth once daily.      amiodarone (PACERONE) 200 MG Tab Take 1 tablet by mouth once daily.      azelastine (ASTELIN) 137 mcg (0.1 %) nasal spray 1 spray (137 mcg total) by Nasal route 2 (two) times daily as needed for Rhinitis. 30 mL 0    benzocaine-menthoL 15-3.6 mg Lozg 1 each by Mucous Membrane route every 4 (four) hours as needed. 18 lozenge 0    benzocaine-menthoL 6-10 mg lozenge Take 1 lozenge by mouth every 2 (two) hours as needed (Sore Throat). 18 tablet 0    cetirizine (ZYRTEC) 10 MG tablet Take 10 mg by mouth.      ELIQUIS 5 mg Tab Take 5 mg by mouth 2 (two) times daily.      ergocalciferol (ERGOCALCIFEROL) 50,000 unit Cap TAKE ONE CAPSULE BY MOUTH EVERY 30 DAYS      furosemide (LASIX) 20 MG tablet Take 1 tablet every day by oral route as needed.      guaiFENesin (MUCINEX) 600 mg 12 hr tablet Take 2 tablets (1,200 mg total) by mouth 2 (two) times daily. 30 tablet 0    hydroCHLOROthiazide (MICROZIDE) 12.5 mg capsule Take 12.5 mg by mouth.      hydrOXYzine HCL (ATARAX) 25 MG tablet TAKE ONE  TABLET BY MOUTH THREE TIMES DAILY FOR 5 DAYS      ibandronate (BONIVA) 150 mg tablet TAKE ONE TABLET BY MOUTH EVERY 30 DAYS IN THE MORNING      JARDIANCE 10 mg tablet Take 1 tablet by mouth once daily.      levocetirizine (XYZAL) 5 MG tablet Take 1 tablet (5 mg total) by mouth every evening. 30 tablet 0    metoprolol tartrate (LOPRESSOR) 50 MG tablet Take 1 tablet by mouth 2 (two) times daily.      pravastatin (PRAVACHOL) 10 MG tablet Take 1 tablet by mouth every evening.      pregabalin (LYRICA) 100 MG capsule Take 1 capsule by mouth 2 (two) times daily.      primidone (MYSOLINE) 50 MG Tab Take 5 tablets 3 times a day by oral route.      SYNTHROID 112 mcg tablet Take 1 tablet by mouth once daily.      triamcinolone acetonide 0.1% (KENALOG) 0.1 % cream APPLY A THIN LAYER TO THE AFFECTED AREA(S) BY TOPICAL ROUTE 2 TIMES PER DAY         SCHEDULED MEDS:   apixaban  5 mg Oral BID    digoxin  125 mcg Intravenous Q6H    furosemide (LASIX) injection  20 mg Intravenous Daily    levothyroxine  112 mcg Oral Before breakfast    metoprolol tartrate  12.5 mg Oral BID    pravastatin  10 mg Oral QHS    predniSONE  20 mg Oral Daily    pregabalin  100 mg Oral BID       CONTINUOUS INFUSIONS:   dilTIAZem  10 mg/hr Intravenous Continuous 10 mL/hr at 01/01/25 2141 10 mg/hr at 01/01/25 2141         PRN MEDS:  Current Facility-Administered Medications:     acetaminophen, 650 mg, Oral, Q4H PRN    albuterol-ipratropium, 3 mL, Nebulization, Q4H PRN    aluminum-magnesium hydroxide-simethicone, 30 mL, Oral, QID PRN    dextrose 50%, 12.5 g, Intravenous, PRN    dextrose 50%, 25 g, Intravenous, PRN    glucagon (human recombinant), 1 mg, Intramuscular, PRN    glucose, 16 g, Oral, PRN    glucose, 24 g, Oral, PRN    magnesium oxide, 800 mg, Oral, PRN    magnesium oxide, 800 mg, Oral, PRN    melatonin, 6 mg, Oral, Nightly PRN    naloxone, 0.02 mg, Intravenous, PRN    ondansetron, 4 mg, Intravenous, Q6H PRN    potassium bicarbonate, 35 mEq, Oral,  PRN    potassium bicarbonate, 50 mEq, Oral, PRN    potassium bicarbonate, 60 mEq, Oral, PRN    potassium, sodium phosphates, 2 packet, Oral, PRN    potassium, sodium phosphates, 2 packet, Oral, PRN    potassium, sodium phosphates, 2 packet, Oral, PRN    senna-docusate 8.6-50 mg, 1 tablet, Oral, BID PRN    sodium chloride 0.9%, 10 mL, Intravenous, Q12H PRN    LABS AND DIAGNOSTICS     CBC LAST 3 DAYS  Recent Labs   Lab 12/30/24  1130 12/31/24  0511 12/31/24  0931 01/01/25  0321   WBC 11.17 14.34* 10.91 10.74   RBC 4.69 3.02* 4.70 4.24   HGB 13.6 8.4* 13.3 12.2   HCT 40.8 25.9* 40.5 36.7*   MCV 87 86 86 87   MCH 29.0 27.8 28.3 28.8   MCHC 33.3 32.4 32.8 33.2   RDW 15.3* 16.3* 15.3* 15.0*    514* 475* 450   MPV 8.7* 10.0 9.0* 8.8*   GRAN 83.6*  9.3* 76.9*  11.0*  --  64.4  6.9   LYMPH 7.5*  0.8* 11.0*  1.6  --  19.8  2.1   MONO 5.3  0.6 9.1  1.3*  --  8.9  1.0   BASO 0.07 0.03  --  0.10   NRBC 0 0  --  0       COAGULATION LAST 3 DAYS  Recent Labs   Lab 12/27/24  1600 12/28/24  0345   INR 1.1 1.1   APTT  --  41.6*       CHEMISTRY LAST 3 DAYS  Recent Labs   Lab 12/27/24  1600 12/27/24  2315 12/30/24  1130 12/31/24  0511 12/31/24  1342 01/01/25  0321   *   < > 131*  133* 134* 131* 132*   K 4.3   < > 4.1  4.2 4.0 4.7 4.2   CL 93*   < > 96  97 98 94* 96   CO2 26   < > 30*  32* 27 32* 31*   ANIONGAP 5*   < > 5*  4* 9 5* 5*   BUN 13   < > 18  18 26* 16 16   CREATININE 0.6   < > 0.5  0.5 1.3 0.5 0.5      < > 144*  145* 108 189* 83   CALCIUM 9.1   < > 9.5  9.1 8.5* 9.6 9.0   MG 2.1   < > 1.8 1.5*  --  1.9   ALBUMIN 3.5  --   --   --   --   --    PROT 6.7  --   --   --   --   --    ALKPHOS 121  --   --   --   --   --    ALT 24  --   --   --   --   --    AST 35  --   --   --   --   --    BILITOT 0.6  --   --   --   --   --     < > = values in this interval not displayed.       CARDIAC PROFILE LAST 3 DAYS  Recent Labs   Lab 12/27/24  1600   *   TROPONINIHS 13.6       ENDOCRINE LAST 3  "DAYS  Recent Labs   Lab 12/27/24  1600   TSH 10.384*       LAST ARTERIAL BLOOD GAS  ABG  No results for input(s): "PH", "PO2", "PCO2", "HCO3", "BE" in the last 168 hours.    LAST 7 DAYS MICROBIOLOGY   Microbiology Results (last 7 days)       ** No results found for the last 168 hours. **            MOST RECENT IMAGING  Echo    Left Ventricle: The left ventricle is normal in size. Normal wall   thickness. Normal wall motion. There is normal systolic function with a   visually estimated ejection fraction of 55 - 60%. There is normal   diastolic function.    Right Ventricle: Normal right ventricular cavity size. Wall thickness   is normal. Systolic function is normal.    Left Atrium: Left atrium is moderately dilated.    Right Atrium: Right atrium is moderately dilated.    Mitral Valve: There is mild regurgitation with a centrally directed   jet.    IVC/SVC: Normal venous pressure at 3 mmHg.      ECHOCARDIOGRAM RESULTS (last 5)  No results found for this or any previous visit.      CURRENT/PREVIOUS VISIT EKG  Results for orders placed or performed during the hospital encounter of 12/27/24   EKG 12-lead    Collection Time: 01/01/25  4:40 AM   Result Value Ref Range    QRS Duration 96 ms    [facility] QTC Calculation 491 ms    Narrative    Test Reason : R07.9,    Vent. Rate : 130 BPM     Atrial Rate :    BPM     P-R Int :    ms          QRS Dur :  96 ms      QT Int : 334 ms       P-R-T Axes :    111   2 degrees    QTcB Int : 491 ms    Atrial fibrillation with rapid ventricular response with premature  ventricular or aberrantly conducted complexes  Right axis deviation  Low voltage QRS  Incomplete right bundle branch block  Nonspecific ST and T wave abnormality  Abnormal ECG  No previous ECGs available    Referred By: AAAREFERRAL SELF           Confirmed By:            ASSESSMENT/PLAN:     Active Hospital Problems    Diagnosis    *Atrial fibrillation with RVR    Pericardial effusion    Hyponatremia    Congestive heart failure    " Asthma    Hypertension, essential, benign    Hypothyroidism    Obesity (BMI 30-39.9)       ASSESSMENT & PLAN:     Afib with RVR s/p successful DCCV 12/29.  Went back into AFib with RVR again  HTN  Hx of CVA  Smoker   Hypothyroidism  Pericardial effusion-improved      RECOMMENDATIONS:    Patient went back into AFib with RVR again.  Had cardioversion 3 days ago.  She was cardioverted twice in the last 2 months and reverted back into AFib.  Continue Cardizem drip  Amiodarone was not initially initiated due to elevated TSH.  However due to recurrent AFib with RVR may need to start amiodarone at this point.  Start IV amiodarone infusion  Continue anticoagulation  Will follow  Benefits from outpatient EP evaluation for ablation      Jacqueline Santos MD  Cardiology  Date of Service: 1/1/25

## 2025-01-02 NOTE — PLAN OF CARE
Problem: Adult Inpatient Plan of Care  Goal: Plan of Care Review  Outcome: Progressing  Goal: Optimal Comfort and Wellbeing  Outcome: Progressing     Problem: Skin Injury Risk Increased  Goal: Skin Health and Integrity  Outcome: Progressing     Problem: Fall Injury Risk  Goal: Absence of Fall and Fall-Related Injury  Outcome: Progressing

## 2025-01-02 NOTE — SUBJECTIVE & OBJECTIVE
Interval History:  Patient is seen and examined, acute complaints    Review of Systems   Respiratory:  Negative for chest tightness and shortness of breath.    Cardiovascular:  Negative for chest pain, palpitations and leg swelling.   Gastrointestinal:  Negative for abdominal distention.     Objective:     Vital Signs (Most Recent):  Temp: 97.3 °F (36.3 °C) (01/02/25 1156)  Pulse: (!) 115 (01/02/25 1226)  Resp: 17 (01/02/25 0703)  BP: 114/77 (01/02/25 1226)  SpO2: 95 % (01/02/25 1156) Vital Signs (24h Range):  Temp:  [97.3 °F (36.3 °C)-98 °F (36.7 °C)] 97.3 °F (36.3 °C)  Pulse:  [] 115  Resp:  [16-17] 17  SpO2:  [94 %-96 %] 95 %  BP: (101-120)/(64-77) 114/77     Weight: 69.4 kg (153 lb)  Body mass index is 26.26 kg/m².    Intake/Output Summary (Last 24 hours) at 1/2/2025 1415  Last data filed at 1/2/2025 1331  Gross per 24 hour   Intake 790.11 ml   Output 2550 ml   Net -1759.89 ml         Physical Exam  Vitals reviewed.   Constitutional:       General: She is awake. She is not in acute distress.     Appearance: Normal appearance. She is not ill-appearing or diaphoretic.   Cardiovascular:      Rate and Rhythm: Normal rate and regular rhythm.      Heart sounds: Normal heart sounds.      No friction rub. No gallop.   Pulmonary:      Effort: Pulmonary effort is normal. No accessory muscle usage or respiratory distress.      Breath sounds: Rales (bibasilar) present. No wheezing (Diffuse expiratory).      Comments: Improving  Abdominal:      General: Abdomen is flat. Bowel sounds are normal.      Palpations: Abdomen is soft.      Tenderness: There is no abdominal tenderness. There is no guarding or rebound.   Musculoskeletal:      Right lower leg: Edema present.      Left lower leg: Edema present.   Skin:     General: Skin is warm and dry.   Neurological:      Mental Status: She is alert and oriented to person, place, and time.   Psychiatric:         Behavior: Behavior is cooperative.             Significant Labs:  All pertinent labs within the past 24 hours have been reviewed.    Significant Imaging: I have reviewed all pertinent imaging results/findings within the past 24 hours.

## 2025-01-02 NOTE — PROGRESS NOTES
Progress Note  Nephrology    Consult Requested By: Chon Shepard DO    Reason for Consult:   hyponatremia    SUBJECTIVE:     History of Present Illness:   85 y/o female patient presented to the ED with a chief complaint of palpitations, weakness, fatigue, cough, SOB, LE edema.  ED workup revealed sodium of 124, chloride of 93, BNP of 156. CXR with cardiomegaly. Scattered linear and ground-glass opacities in the mid lower lung zones. Blunting of the costophrenic angles compatible mall components of pleural fluid.  , pt was put on cardizem gtt.  Nephrology is consulted for hyponatremia.    12/28  Na+ the same, of note, pt on HCTZ at home.  Getting lasix only here.  BUN/Scr wnl.  TSH high.  ECHO done and report pending.  Na+ levels usually a little low w/baseline being around 134.  Breath sounds w/rhonchi and wheezing throughout, +LE edema.  denies SOB.    12/29  Na+ improving, urine osm 223, urine Na+ 53 .  ECHO w/EF 40-45%.  UA w/no cast, but 4+ glucose.  Low sp gravity.  Lungs sound much better today.  No new complaints.  12/30 VSS, on 2L NC, UOP 2.1L  12/31 VSS, on 2L NC, UOP 300cc  1/1 elevated HR with drop in BP this AM, on 2L NC, no UOP recorded  1/2 persistent AF with RVR despite digoxin and cardizem gtt- plan to start amio gtt    Assessment/plan:    CED  Hyponatremia, A/C  HTN/CHF/AF with RVR    --new CED- stopped IV lasix- now resolved  --serum Na stable/improved- continue to hold HCTZ indefinitely- fluid restriction 1.5L  --ok to resume jardiance at discharge   --BP dropped with rise in HR- AF management per cardiology--s/p DCCV, on metoprolol, eliquis- now on amio gtt    Will sign off.  Please call with questions.    Review of Systems:  As above     apixaban  5 mg Oral BID    furosemide (LASIX) injection  20 mg Intravenous Daily    levothyroxine  112 mcg Oral Before breakfast    metoprolol tartrate  12.5 mg Oral BID    pravastatin  10 mg Oral QHS    predniSONE  20 mg Oral Daily    pregabalin  100  mg Oral BID        OBJECTIVE:     Vital Signs Range (Last 24H):  Temp:  [97.3 °F (36.3 °C)-98 °F (36.7 °C)]   Pulse:  []   Resp:  [16-18]   BP: (101-120)/(64-87)   SpO2:  [94 %-96 %]     Physical Exam:  General- NAD noted  HEENT- WNL  Neck- supple  CV- Regular rate and rhythm  Resp- +wheezing/rhonchi.  No increased WOB  GI- Non tender/non-distended, BS normoactive x4 quads  Extrem- No cyanosis, clubbing, +edema.  Derm- skin w/d  Neuro-  No flap.     Body mass index is 26.26 kg/m².    Laboratory:  CBC:   Recent Labs   Lab 01/02/25  0611   WBC 9.90   RBC 4.87   HGB 13.6   HCT 42.3   *   MCV 87   MCH 27.9   MCHC 32.2     CMP:   Recent Labs   Lab 12/27/24  1600 12/27/24  2315 01/02/25  0611      < > 95   CALCIUM 9.1   < > 9.4   ALBUMIN 3.5  --   --    PROT 6.7  --   --    *   < > 133*   K 4.3   < > 4.1   CO2 26   < > 30*   CL 93*   < > 99   BUN 13   < > 14   CREATININE 0.6   < > 0.5   ALKPHOS 121  --   --    ALT 24  --   --    AST 35  --   --    BILITOT 0.6  --   --     < > = values in this interval not displayed.       Diagnostic Results:  Labs: Reviewed      ASSESSMENT/PLAN:     Active Hospital Problems    Diagnosis  POA    *Atrial fibrillation with RVR [I48.91]  Yes    Pericardial effusion [I31.39]  Yes    Hyponatremia [E87.1]  Yes    Congestive heart failure [I50.9]  Yes    Asthma [J45.909]  Yes    Hypertension, essential, benign [I10]  Yes    Hypothyroidism [E03.9]  Yes    Obesity (BMI 30-39.9) [E66.9]  Yes      Resolved Hospital Problems   No resolved problems to display.         Thank you for allowing us to participate in the care of your patient.     Patient care time was spent personally by me on the following activities: > 35 minutes  Obtaining a history.  Examination of patient.  Providing medical care at the patients bedside.  Developing a treatment plan with patient or surrogate and bedside caregivers.  Ordering and reviewing laboratory studies, radiographic studies, pulse  oximetry.  Ordering and performing treatments and interventions.  Evaluation of patient's response to treatment.  Discussions with consultants while on the unit and immediately available to the patient.  Re-evaluation of the patient's condition.  Documentation in the medical record.      Beltran Stout MD

## 2025-01-02 NOTE — PROGRESS NOTES
[facility] Medicine  Progress Note    Patient Name: Niurka Patel  MRN: [id number]  Patient Class: IP- Inpatient   Admission Date: 12/27/2024  Length of Stay: 5 days  Attending Physician: Chon Shepard DO  Primary Care Provider: Елена Barron NP        Subjective     Principal Problem:Atrial fibrillation with RVR        HPI:  Ms. Patel is an 84 yr old female with a hx of with RVR, asthma, CHF, HTN, hypothyroidism, obesity, CVA, HLD, PE, tremors who presented to the ED with a chief complaint of palpitations.  Upon talking to patient she actually denies ever feeling palpitations or any, fluttering in her chest.  She states that over the last 4 weeks she has had increased generalized weakness and fatigue.  She also endorses productive cough with white sputum, shortness of breath, and leg swelling.  She states that her home health nurse came to check on her today and noticed that she seemed very wobbly while using her cane and advised her to come to the emergency room for further evaluation.  Patient states that she was in AFib about 1 month ago and had cardioversion on 11/26/2024 with successful conversion to normal sinus rhythm.  Per chart review, she was discharged on amiodarone 400 mg b.i.d. for 10 days then 200 mg daily afterwards.  She was also told to continue taking her metoprolol and Eliquis.  She also endorses a history of CHF denies PND and orthopnea.  Denies any recent sick contacts or home oxygen use.  Endorses tobacco use and smokes 1 pack of cigarettes a day and denies alcohol and recreational drug use.  She denies fever, chills, palpitations, diaphoresis, chest pain, abdominal pain, nausea, vomiting, diarrhea, dysuria, hematuria, lightheadedness, dizziness, and syncope.    Upon arrival to ED, patient afebrile, HR of 122, RR of 18, BP of 103/74, satting 97% on RA.  Workup in the ED revealed sodium of 124, chloride of 93, BNP of 156.  CXR with cardiomegaly.  Scattered  linear and ground-glass opacities in the mid lower lung zones.  Blunting of the costophrenic angles compatible mall components of pleural fluid.  Questionable nodular opacity in the right upper lobe near the apex.  Follow-up plain radiographs are recommended.  Patient was given aspirin 325 mg oral and initiated on diltiazem titrating gtt while in the ED. case discussed with ED provider and patient will be placed under observation for further management.    Overview/Hospital Course:  Patient was admitted for atrial fibrillation in RVR and had hyponatremia.  Patient was started on diltiazem drip.  Cardiology was consulted.  Cardioversion planned for 12/29.  Electrolytes repleted.  For hyponatremia, HCTZ was stopped. Lasix was given with caution for CHF.  Nephrology closely followed the patient.  At presentation, TSH was elevated but free T4 was normal, held off increasing Synthroid given active AFib with heart rates 130-140.  2D echocardiogram showed moderate pericardial effusion, but clinically she was not in tamponade.  Successful cardioversion to sinus rhythm with 2 shocks of 200 joules.  Diltiazem drip stopped and patient was started on low-dose beta blocker given EF reduced to 40-45% on 2D echocardiogram.  Sodium level improved, she responded well to IV Lasix. HR stable with beta blocker. Repeat ECHO ordered to monitor for pericardial effusion, which showed significant improvement and now only has trivial effusion.    Patient went back into RVR tried metoprolol and digoxin and ultimately needed amiodarone but patient able to be weaned off drip now.  Further recs per cardiology.    Interval History:  Patient is seen and examined, acute complaints    Review of Systems   Respiratory:  Negative for chest tightness and shortness of breath.    Cardiovascular:  Negative for chest pain, palpitations and leg swelling.   Gastrointestinal:  Negative for abdominal distention.     Objective:     Vital Signs (Most  Recent):  Temp: 97.3 °F (36.3 °C) (01/02/25 1156)  Pulse: (!) 115 (01/02/25 1226)  Resp: 17 (01/02/25 0703)  BP: 114/77 (01/02/25 1226)  SpO2: 95 % (01/02/25 1156) Vital Signs (24h Range):  Temp:  [97.3 °F (36.3 °C)-98 °F (36.7 °C)] 97.3 °F (36.3 °C)  Pulse:  [] 115  Resp:  [16-17] 17  SpO2:  [94 %-96 %] 95 %  BP: (101-120)/(64-77) 114/77     Weight: 69.4 kg (153 lb)  Body mass index is 26.26 kg/m².    Intake/Output Summary (Last 24 hours) at 1/2/2025 1415  Last data filed at 1/2/2025 1331  Gross per 24 hour   Intake 790.11 ml   Output 2550 ml   Net -1759.89 ml         Physical Exam  Vitals reviewed.   Constitutional:       General: She is awake. She is not in acute distress.     Appearance: Normal appearance. She is not ill-appearing or diaphoretic.   Cardiovascular:      Rate and Rhythm: Normal rate and regular rhythm.      Heart sounds: Normal heart sounds.      No friction rub. No gallop.   Pulmonary:      Effort: Pulmonary effort is normal. No accessory muscle usage or respiratory distress.      Breath sounds: Rales (bibasilar) present. No wheezing (Diffuse expiratory).      Comments: Improving  Abdominal:      General: Abdomen is flat. Bowel sounds are normal.      Palpations: Abdomen is soft.      Tenderness: There is no abdominal tenderness. There is no guarding or rebound.   Musculoskeletal:      Right lower leg: Edema present.      Left lower leg: Edema present.   Skin:     General: Skin is warm and dry.   Neurological:      Mental Status: She is alert and oriented to person, place, and time.   Psychiatric:         Behavior: Behavior is cooperative.             Significant Labs: All pertinent labs within the past 24 hours have been reviewed.    Significant Imaging: I have reviewed all pertinent imaging results/findings within the past 24 hours.    Assessment and Plan     * Atrial fibrillation with RVR  Patient has paroxysmal (<7 days) atrial fibrillation. Patient is currently in atrial fibrillation.  [id number] Score: 4. The patients heart rate in the last 24 hours is as follows:  Pulse  Min: 97  Max: 141     Antiarrhythmics  metoprolol tartrate (LOPRESSOR) split tablet 12.5 mg, 2 times daily, Oral  diltiaZEM tablet 60 mg, Every 6 hours, Oral    Anticoagulants  apixaban tablet 5 mg, 2 times daily, Oral    Plan  - Replete lytes with a goal of K>4, Mg >2  - Patient is anticoagulated, see medications listed above.  - Patient's afib is currently uncontrolled. Will continue current treatment  - Underwent cardioversion on 11/26/2024 with conversion to NSR, was sent home on amio 400 mg BID for 10 days then 200 mg daily afterwards, says she has been compliant. Was also told to resume Toprol and eliquis.  - Initiated on diltiazem gtt in the ED  - Cardiology consulted  - cardioversion 12/30, diltiazem drip stopped, low-dose beta blocker started given reduced EF  - able to wean off diltiazem and amiodarone, now on digoxin and metoprolol, further recs per cardiology    Pericardial effusion  - moderate pericardial effusion on 2D echocardiogram 12/28   - Clinically not in tamponade physiology  - asymptomatic, vitals stable  - cardiology following  - caution with IV diuresis    Repeat echo 12/30 - now only has trivial effusion, significant improvement  Stable to transfer out of ICU      Hyponatremia  The patient's most recent sodium results are listed below.  Recent Labs     12/31/24  1342 01/01/25  0321 01/02/25  0611   * 132* 133*       Plan  - Correct the sodium by 4-6mEq in 24 hours.   - Urine sodium, urine osmolality, serum osmolality, AM cortisol, or TSH, T4.  - TSH elevated, Free T4 normal - needs repeat labs with synthyroid  - hold IV fluids and reduced ejection fraction  - Monitor sodium Daily.   - Patient hyponatremia is improving  - Nephrology closely following  - discontinue HCTZ at discharge  - IV lasix for diuresis with caution as tolerated  - monitor repeat BMP            Obesity (BMI 30-39.9)  Body mass  "index is 26.68 kg/m². Morbid obesity complicates all aspects of disease management from diagnostic modalities to treatment.        Hypothyroidism  - Continue home synthroid  - TSH elevated, Free T4 normal  - She will need repeat TFTs and Synthroid dose adjusting once heart rates improve.  - Repeat TSH to make sure this is not euthyroid sick syndrome, EPIC gives a hard stop        Hypertension, essential, benign  Latest blood pressure and vitals reviewed-   Temp:  [97.5 °F (36.4 °C)-98.1 °F (36.7 °C)]   Pulse:  [61-82]   Resp:  [14-35]   BP: (113-137)/(60-86)   SpO2:  [92 %-100 %] .     Home meds for hypertension were reviewed and noted below.   Hypertension Medications               furosemide (LASIX) 20 MG tablet Take 1 tablet every day by oral route as needed.    hydroCHLOROthiazide (MICROZIDE) 12.5 mg capsule Take 12.5 mg by mouth.    metoprolol tartrate (LOPRESSOR) 50 MG tablet Take 1 tablet by mouth 2 (two) times daily.          - Holding home HCTZ in the setting of hyponatremia  - Continue home metoprolol with parameters  - Lasix with caution based on BMP    Congestive heart failure  Patient has  hx of  heart failure that is Acute on chronic. On presentation their CHF was decompensated. Evidence of decompensated CHF on presentation includes: edema, dyspnea on exertion (FROST), and shortness of breath. Most recent BNP and echo results are listed below.      No results for input(s): "BNP" in the last 72 hours.    Latest ECHO  No results found for this or any previous visit.    Current Heart Failure Medications  furosemide injection 20 mg, Daily, Intravenous    Plan  - Monitor strict I&Os and daily weights.    - Place on telemetry  - Low sodium diet  - Place on fluid restriction of 1.5 L.   - Cardiology has been consulted  - The patient's volume status is stable but not at their baseline as indicated by edema, crackles on lung auscultation, dyspnea on exertion (FROST), and shortness of breath  - on review of pt's " last echo, revealed normal EF and undetermined diastolic function.  - Cardiology consulted, appreciate recs  - was on IV Lasix, was responding well  - hold hydrochlorothiazide with hyponatremia.    Lasix held given concern of new CED, restart depending on repeat labs        Asthma  - DuoNebs Q6H PRN  - prednisone 20 mg p.o. daily for minimal wheezing      VTE Risk Mitigation (From admission, onward)           Ordered     apixaban tablet 5 mg  2 times daily         12/27/24 1807     Reason for No Pharmacological VTE Prophylaxis  Once        Question:  Reasons:  Answer:  Already adequately anticoagulated on oral Anticoagulants    12/27/24 1748     IP VTE HIGH RISK PATIENT  Once         12/27/24 1748     Place sequential compression device  Until discontinued         12/27/24 1748                    Discharge Planning   CYNTHIA: 1/4/2025     Code Status: Full Code   Medical Readiness for Discharge Date:   Discharge Plan A: Home Health   Discharge Delays: Orders Needed          Treatment, consult recommendation, PT/OT, dispo planning.          Chon Shepard DO  Department of Hospital Medicine   [facility]

## 2025-01-03 LAB
ANION GAP SERPL CALC-SCNC: 6 MMOL/L (ref 8–16)
BASOPHILS # BLD AUTO: 0.09 K/UL (ref 0–0.2)
BASOPHILS NFR BLD: 0.9 % (ref 0–1.9)
BUN SERPL-MCNC: 13 MG/DL (ref 8–23)
CALCIUM SERPL-MCNC: 9.1 MG/DL (ref 8.7–10.5)
CHLORIDE SERPL-SCNC: 101 MMOL/L (ref 95–110)
CO2 SERPL-SCNC: 29 MMOL/L (ref 23–29)
CREAT SERPL-MCNC: 0.5 MG/DL (ref 0.5–1.4)
DIFFERENTIAL METHOD BLD: ABNORMAL
EOSINOPHIL # BLD AUTO: 0.4 K/UL (ref 0–0.5)
EOSINOPHIL NFR BLD: 4.2 % (ref 0–8)
ERYTHROCYTE [DISTWIDTH] IN BLOOD BY AUTOMATED COUNT: 15 % (ref 11.5–14.5)
EST. GFR  (NO RACE VARIABLE): >60 ML/MIN/1.73 M^2
GLUCOSE SERPL-MCNC: 94 MG/DL (ref 70–110)
HCT VFR BLD AUTO: 39.1 % (ref 37–48.5)
HGB BLD-MCNC: 12.9 G/DL (ref 12–16)
IMM GRANULOCYTES # BLD AUTO: 0.05 K/UL (ref 0–0.04)
IMM GRANULOCYTES NFR BLD AUTO: 0.5 % (ref 0–0.5)
LYMPHOCYTES # BLD AUTO: 2 K/UL (ref 1–4.8)
LYMPHOCYTES NFR BLD: 19.3 % (ref 18–48)
MAGNESIUM SERPL-MCNC: 1.8 MG/DL (ref 1.6–2.6)
MCH RBC QN AUTO: 28.7 PG (ref 27–31)
MCHC RBC AUTO-ENTMCNC: 33 G/DL (ref 32–36)
MCV RBC AUTO: 87 FL (ref 82–98)
MONOCYTES # BLD AUTO: 0.9 K/UL (ref 0.3–1)
MONOCYTES NFR BLD: 8.6 % (ref 4–15)
NEUTROPHILS # BLD AUTO: 7 K/UL (ref 1.8–7.7)
NEUTROPHILS NFR BLD: 66.5 % (ref 38–73)
NRBC BLD-RTO: 0 /100 WBC
PHOSPHATE SERPL-MCNC: 2.8 MG/DL (ref 2.7–4.5)
PLATELET # BLD AUTO: 458 K/UL (ref 150–450)
PMV BLD AUTO: 8.7 FL (ref 9.2–12.9)
POTASSIUM SERPL-SCNC: 3.9 MMOL/L (ref 3.5–5.1)
RBC # BLD AUTO: 4.5 M/UL (ref 4–5.4)
SODIUM SERPL-SCNC: 136 MMOL/L (ref 136–145)
WBC # BLD AUTO: 10.48 K/UL (ref 3.9–12.7)

## 2025-01-03 PROCEDURE — 21400001 HC TELEMETRY ROOM

## 2025-01-03 PROCEDURE — 63600175 PHARM REV CODE 636 W HCPCS

## 2025-01-03 PROCEDURE — 27000221 HC OXYGEN, UP TO 24 HOURS

## 2025-01-03 PROCEDURE — 36415 COLL VENOUS BLD VENIPUNCTURE: CPT

## 2025-01-03 PROCEDURE — 85025 COMPLETE CBC W/AUTO DIFF WBC: CPT

## 2025-01-03 PROCEDURE — 99233 SBSQ HOSP IP/OBS HIGH 50: CPT | Mod: ,,, | Performed by: INTERNAL MEDICINE

## 2025-01-03 PROCEDURE — 25000003 PHARM REV CODE 250

## 2025-01-03 PROCEDURE — 83735 ASSAY OF MAGNESIUM: CPT

## 2025-01-03 PROCEDURE — 84100 ASSAY OF PHOSPHORUS: CPT

## 2025-01-03 PROCEDURE — 94761 N-INVAS EAR/PLS OXIMETRY MLT: CPT

## 2025-01-03 PROCEDURE — 25000003 PHARM REV CODE 250: Performed by: FAMILY MEDICINE

## 2025-01-03 PROCEDURE — 80048 BASIC METABOLIC PNL TOTAL CA: CPT

## 2025-01-03 PROCEDURE — 63600175 PHARM REV CODE 636 W HCPCS: Performed by: INTERNAL MEDICINE

## 2025-01-03 PROCEDURE — 99900035 HC TECH TIME PER 15 MIN (STAT)

## 2025-01-03 RX ORDER — METOPROLOL TARTRATE 50 MG/1
50 TABLET ORAL 2 TIMES DAILY
Status: DISCONTINUED | OUTPATIENT
Start: 2025-01-03 | End: 2025-01-05 | Stop reason: HOSPADM

## 2025-01-03 RX ORDER — DIGOXIN 125 MCG
0.12 TABLET ORAL DAILY
Status: DISCONTINUED | OUTPATIENT
Start: 2025-01-03 | End: 2025-01-05 | Stop reason: HOSPADM

## 2025-01-03 RX ADMIN — AMIODARONE HYDROCHLORIDE 0.5 MG/MIN: 1.8 INJECTION, SOLUTION INTRAVENOUS at 08:01

## 2025-01-03 RX ADMIN — METOPROLOL TARTRATE 12.5 MG: 25 TABLET, FILM COATED ORAL at 08:01

## 2025-01-03 RX ADMIN — Medication 800 MG: at 08:01

## 2025-01-03 RX ADMIN — DILTIAZEM HYDROCHLORIDE 60 MG: 60 TABLET, FILM COATED ORAL at 12:01

## 2025-01-03 RX ADMIN — APIXABAN 5 MG: 5 TABLET, FILM COATED ORAL at 08:01

## 2025-01-03 RX ADMIN — METOPROLOL TARTRATE 50 MG: 50 TABLET, FILM COATED ORAL at 08:01

## 2025-01-03 RX ADMIN — DILTIAZEM HYDROCHLORIDE 60 MG: 60 TABLET, FILM COATED ORAL at 05:01

## 2025-01-03 RX ADMIN — LEVOTHYROXINE SODIUM 112 MCG: 112 TABLET ORAL at 05:01

## 2025-01-03 RX ADMIN — DIGOXIN 0.12 MG: 125 TABLET ORAL at 11:01

## 2025-01-03 RX ADMIN — PREGABALIN 100 MG: 75 CAPSULE ORAL at 08:01

## 2025-01-03 RX ADMIN — PREDNISONE 20 MG: 20 TABLET ORAL at 08:01

## 2025-01-03 RX ADMIN — DILTIAZEM HYDROCHLORIDE 10 MG/HR: 100 INJECTION, POWDER, LYOPHILIZED, FOR SOLUTION INTRAVENOUS at 05:01

## 2025-01-03 RX ADMIN — METOPROLOL TARTRATE 37.5 MG: 25 TABLET, FILM COATED ORAL at 11:01

## 2025-01-03 RX ADMIN — PRAVASTATIN SODIUM 10 MG: 10 TABLET ORAL at 08:01

## 2025-01-03 NOTE — PT/OT/SLP PROGRESS
Physical Therapy      Patient Name:  Niurka Patel   MRN:  [id number]    Patient not seen today secondary to Nurse/ SANDRA hold (RN hold secondary to afib). Will follow-up 1/6/25.

## 2025-01-03 NOTE — PROGRESS NOTES
[facility]  Department of Cardiology  Consult Note      PATIENT NAME: Niurka Patel    MRN: [id number]  TODAY'S DATE: 01/03/2025  ADMIT DATE: 12/27/2024                          CONSULT REQUESTED BY: Chon Shepard DO    SUBJECTIVE     PRINCIPAL PROBLEM: Atrial fibrillation with RVR    INTERVAL HISTORY    1/3/25    Patient is back on diltiazem and amiodarone gtts.  Sitting in chair.  Denies complaints.      1/2/25    Resting in bed. NAD. Denies complaints.  Remains on cardizem and amiodarone gtt.      01/01/2025:  Patient went into AFib with RVR again.  Started on Cardizem drip.    12/30/24    Resting in chair. NAD. 2L NC. S/p RODNEY/CV yest. Remains in SR.  Denies complaints. VSS, labs reviewed, stable. -1.4 L yest. Wheezing present.  Repeat echo stable.  Trivial effusion        HPI:    Ms. Patel is an 84 year old female who presented to the ER with complaints of palpitations. She has a known hx of PAF and was cardioverted about a month ago. She was found to be in Afib with RVR on arrival  as well as some evidence of CHF.  Patient seen resting in bed with no acute distress noted.  Seen lying relatively comfortably near flat.  She has been seen by Nephrology for hyponatremia.  Patient had been on amiodarone outpatient is noted to be hypothyroid and thus amiodarone has not been continued.      REASON FOR CONSULT:  From Hospitalist H&P: Ms. Patel is an 84 yr old female with a hx of with RVR, asthma, CHF, HTN, hypothyroidism, obesity, CVA, HLD, PE, tremors who presented to the ED with a chief complaint of palpitations.  Upon talking to patient she actually denies ever feeling palpitations or any, fluttering in her chest.  She states that over the last 4 weeks she has had increased generalized weakness and fatigue.  She also endorses productive cough with white sputum, shortness of breath, and leg swelling.  She states that her home health nurse came to check on her today and noticed that she seemed very  wobbly while using her cane and advised her to come to the emergency room for further evaluation.  Patient states that she was in AFib about 1 month ago and had cardioversion on 11/26/2024 with successful conversion to normal sinus rhythm.  Per chart review, she was discharged on amiodarone 400 mg b.i.d. for 10 days then 200 mg daily afterwards.  She was also told to continue taking her metoprolol and Eliquis.  She also endorses a history of CHF denies PND and orthopnea.  Denies any recent sick contacts or home oxygen use.  Endorses tobacco use and smokes 1 pack of cigarettes a day and denies alcohol and recreational drug use.  She denies fever, chills, palpitations, diaphoresis, chest pain, abdominal pain, nausea, vomiting, diarrhea, dysuria, hematuria, lightheadedness, dizziness, and syncope.     Upon arrival to ED, patient afebrile, HR of 122, RR of 18, BP of 103/74, satting 97% on RA.  Workup in the ED revealed sodium of 124, chloride of 93, BNP of 156.  CXR with cardiomegaly.  Scattered linear and ground-glass opacities in the mid lower lung zones.  Blunting of the costophrenic angles compatible mall components of pleural fluid.  Questionable nodular opacity in the right upper lobe near the apex.  Follow-up plain radiographs are recommended.  Patient was given aspirin 325 mg oral and initiated on diltiazem titrating gtt while in the ED. case discussed with ED provider and patient will be placed under observation for further management.     No past medical history on file.     No past surgical history on file.     Review of patient's allergies indicates:  No Known Allergies     No current facility-administered medications on file prior to encounter.         Review of patient's allergies indicates:  No Known Allergies    No past medical history on file.  Past Surgical History:   Procedure Laterality Date    CARDIOVERSION  12/29/2024           REVIEW OF SYSTEMS  Per HPI    OBJECTIVE     VITAL SIGNS (Most  Recent)  Temp: 97.3 °F (36.3 °C) (01/03/25 0801)  Pulse: 104 (01/03/25 1245)  Resp: 16 (01/03/25 1245)  BP: 114/78 (01/03/25 1257)  SpO2: 96 % (01/03/25 1245)    VENTILATION STATUS  Resp: 16 (01/03/25 1245)  SpO2: 96 % (01/03/25 1245)  Oxygen Concentration (%):  [28] 28        I & O (Last 24H):  Intake/Output Summary (Last 24 hours) at 1/3/2025 1437  Last data filed at 1/3/2025 1310  Gross per 24 hour   Intake 900 ml   Output 3050 ml   Net -2150 ml       WEIGHTS  Wt Readings from Last 1 Encounters:   01/02/25 0438 69.4 kg (153 lb)   12/31/24 2123 69.1 kg (152 lb 5.4 oz)   12/30/24 0514 70.5 kg (155 lb 6.8 oz)   12/29/24 0410 70.9 kg (156 lb 4.9 oz)   12/28/24 0530 73.7 kg (162 lb 7.7 oz)   12/27/24 1528 81.6 kg (180 lb)       PHYSICAL EXAM  CONSTITUTIONAL: NAD  HEENT: Normocephalic               NECK:  No JVD   CVS: S1S2+, iRRR  LUNGS: Clear  ABDOMEN: nondistended   EXTREMITIES: mild edema   NEURO: AAO X 3  PSY: Normal affect      HOME MEDICATIONS:  No current facility-administered medications on file prior to encounter.     Current Outpatient Medications on File Prior to Encounter   Medication Sig Dispense Refill    atorvastatin (LIPITOR) 10 MG tablet Take 10 mg by mouth once daily.      amiodarone (PACERONE) 200 MG Tab Take 1 tablet by mouth once daily.      azelastine (ASTELIN) 137 mcg (0.1 %) nasal spray 1 spray (137 mcg total) by Nasal route 2 (two) times daily as needed for Rhinitis. 30 mL 0    benzocaine-menthoL 15-3.6 mg Lozg 1 each by Mucous Membrane route every 4 (four) hours as needed. 18 lozenge 0    benzocaine-menthoL 6-10 mg lozenge Take 1 lozenge by mouth every 2 (two) hours as needed (Sore Throat). 18 tablet 0    cetirizine (ZYRTEC) 10 MG tablet Take 10 mg by mouth.      ELIQUIS 5 mg Tab Take 5 mg by mouth 2 (two) times daily.      ergocalciferol (ERGOCALCIFEROL) 50,000 unit Cap TAKE ONE CAPSULE BY MOUTH EVERY 30 DAYS      furosemide (LASIX) 20 MG tablet Take 1 tablet every day by oral route as  needed.      guaiFENesin (MUCINEX) 600 mg 12 hr tablet Take 2 tablets (1,200 mg total) by mouth 2 (two) times daily. 30 tablet 0    hydroCHLOROthiazide (MICROZIDE) 12.5 mg capsule Take 12.5 mg by mouth.      hydrOXYzine HCL (ATARAX) 25 MG tablet TAKE ONE TABLET BY MOUTH THREE TIMES DAILY FOR 5 DAYS      ibandronate (BONIVA) 150 mg tablet TAKE ONE TABLET BY MOUTH EVERY 30 DAYS IN THE MORNING      JARDIANCE 10 mg tablet Take 1 tablet by mouth once daily.      levocetirizine (XYZAL) 5 MG tablet Take 1 tablet (5 mg total) by mouth every evening. 30 tablet 0    metoprolol tartrate (LOPRESSOR) 50 MG tablet Take 1 tablet by mouth 2 (two) times daily.      pravastatin (PRAVACHOL) 10 MG tablet Take 1 tablet by mouth every evening.      pregabalin (LYRICA) 100 MG capsule Take 1 capsule by mouth 2 (two) times daily.      primidone (MYSOLINE) 50 MG Tab Take 5 tablets 3 times a day by oral route.      SYNTHROID 112 mcg tablet Take 1 tablet by mouth once daily.      triamcinolone acetonide 0.1% (KENALOG) 0.1 % cream APPLY A THIN LAYER TO THE AFFECTED AREA(S) BY TOPICAL ROUTE 2 TIMES PER DAY         SCHEDULED MEDS:   apixaban  5 mg Oral BID    digoxin  0.125 mg Oral Daily    diltiaZEM  60 mg Oral Q6H    furosemide  20 mg Oral Daily    levothyroxine  112 mcg Oral Before breakfast    metoprolol tartrate  50 mg Oral BID    pravastatin  10 mg Oral QHS    predniSONE  20 mg Oral Daily    pregabalin  100 mg Oral BID       CONTINUOUS INFUSIONS:   dilTIAZem  10 mg/hr Intravenous Continuous 10 mL/hr at 01/03/25 0517 10 mg/hr at 01/03/25 0517           PRN MEDS:  Current Facility-Administered Medications:     acetaminophen, 650 mg, Oral, Q4H PRN    albuterol-ipratropium, 3 mL, Nebulization, Q4H PRN    aluminum-magnesium hydroxide-simethicone, 30 mL, Oral, QID PRN    dextrose 50%, 12.5 g, Intravenous, PRN    dextrose 50%, 25 g, Intravenous, PRN    glucagon (human recombinant), 1 mg, Intramuscular, PRN    glucose, 16 g, Oral, PRN    glucose,  24 g, Oral, PRN    magnesium oxide, 800 mg, Oral, PRN    magnesium oxide, 800 mg, Oral, PRN    melatonin, 6 mg, Oral, Nightly PRN    naloxone, 0.02 mg, Intravenous, PRN    ondansetron, 4 mg, Intravenous, Q6H PRN    potassium bicarbonate, 35 mEq, Oral, PRN    potassium bicarbonate, 50 mEq, Oral, PRN    potassium bicarbonate, 60 mEq, Oral, PRN    potassium, sodium phosphates, 2 packet, Oral, PRN    potassium, sodium phosphates, 2 packet, Oral, PRN    potassium, sodium phosphates, 2 packet, Oral, PRN    senna-docusate 8.6-50 mg, 1 tablet, Oral, BID PRN    sodium chloride 0.9%, 10 mL, Intravenous, Q12H PRN    LABS AND DIAGNOSTICS     CBC LAST 3 DAYS  Recent Labs   Lab 01/02/25  0611 01/02/25  0945 01/03/25  0505   WBC 9.90 8.73 10.48   RBC 4.87 4.24 4.50   HGB 13.6 12.0 12.9   HCT 42.3 37.7 39.1   MCV 87 89 87   MCH 27.9 28.3 28.7   MCHC 32.2 31.8* 33.0   RDW 15.0* 15.2* 15.0*   * 345 458*   MPV 8.8* 9.5 8.7*   GRAN 61.8  6.1 64.1  5.6 66.5  7.0   LYMPH 23.2  2.3 21.0  1.8 19.3  2.0   MONO 7.9  0.8 7.9  0.7 8.6  0.9   BASO 0.09 0.08 0.09   NRBC 0 0 0       COAGULATION LAST 3 DAYS  Recent Labs   Lab 12/27/24  1600 12/28/24  0345   INR 1.1 1.1   APTT  --  41.6*       CHEMISTRY LAST 3 DAYS  Recent Labs   Lab 12/27/24  1600 12/27/24  2315 01/01/25  0321 01/02/25  0611 01/03/25  0505   *   < > 132* 133* 136   K 4.3   < > 4.2 4.1 3.9   CL 93*   < > 96 99 101   CO2 26   < > 31* 30* 29   ANIONGAP 5*   < > 5* 4* 6*   BUN 13   < > 16 14 13   CREATININE 0.6   < > 0.5 0.5 0.5      < > 83 95 94   CALCIUM 9.1   < > 9.0 9.4 9.1   MG 2.1   < > 1.9 2.0 1.8   ALBUMIN 3.5  --   --   --   --    PROT 6.7  --   --   --   --    ALKPHOS 121  --   --   --   --    ALT 24  --   --   --   --    AST 35  --   --   --   --    BILITOT 0.6  --   --   --   --     < > = values in this interval not displayed.       CARDIAC PROFILE LAST 3 DAYS  Recent Labs   Lab 12/27/24  1600   *   TROPONINIHS 13.6       ENDOCRINE LAST  "3 DAYS  Recent Labs   Lab 12/27/24  1600   TSH 10.384*       LAST ARTERIAL BLOOD GAS  ABG  No results for input(s): "PH", "PO2", "PCO2", "HCO3", "BE" in the last 168 hours.    LAST 7 DAYS MICROBIOLOGY   Microbiology Results (last 7 days)       ** No results found for the last 168 hours. **            MOST RECENT IMAGING  Echo    Left Ventricle: The left ventricle is normal in size. Normal wall   thickness. Normal wall motion. There is normal systolic function with a   visually estimated ejection fraction of 55 - 60%. There is normal   diastolic function.    Right Ventricle: Normal right ventricular cavity size. Wall thickness   is normal. Systolic function is normal.    Left Atrium: Left atrium is moderately dilated.    Right Atrium: Right atrium is moderately dilated.    Mitral Valve: There is mild regurgitation with a centrally directed   jet.    IVC/SVC: Normal venous pressure at 3 mmHg.      ECHOCARDIOGRAM RESULTS (last 5)  No results found for this or any previous visit.      CURRENT/PREVIOUS VISIT EKG  Results for orders placed or performed during the hospital encounter of 12/27/24   EKG 12-lead    Collection Time: 01/01/25  4:40 AM   Result Value Ref Range    QRS Duration 96 ms    OHS QTC Calculation 491 ms    Narrative    Test Reason : R07.9,    Vent. Rate : 130 BPM     Atrial Rate :    BPM     P-R Int :    ms          QRS Dur :  96 ms      QT Int : 334 ms       P-R-T Axes :    111   2 degrees    QTcB Int : 491 ms    Atrial fibrillation with rapid ventricular response with premature  ventricular or aberrantly conducted complexes  Right axis deviation  Low voltage QRS  Incomplete right bundle branch block  Nonspecific ST and T wave abnormality  Abnormal ECG  No previous ECGs available    Referred By: AAAREFERRAL SELF           Confirmed By:            ASSESSMENT/PLAN:     Active Hospital Problems    Diagnosis    *Atrial fibrillation with RVR    Pericardial effusion    Hyponatremia    Congestive heart failure "    Asthma    Hypertension, essential, benign    Hypothyroidism    Obesity (BMI 30-39.9)       ASSESSMENT & PLAN:     Afib with RVR s/p successful DCCV 12/29.  Went back into AFib with RVR again  HTN  Hx of CVA  Smoker   Hypothyroidism  Pericardial effusion-improved      RECOMMENDATIONS:    Failed CV.  Rate control.   Stop amiodarone gtt.  Continue cardizem 60 mg q 6 hours.  Increase metoprolol 50 mg BID.  Restart digoxin 0.125 mg daily.   She was cardioverted twice in the last 2 months and reverted back into AFib.  Will attempt to avoid amiodarone with thyroid dysfunction. TSH 10.  Continue anticoagulation.  Will follow  Benefits from outpatient EP evaluation for ablation      Linda Gonzales NP  Cardiology  Date of Service: 1/3/25

## 2025-01-03 NOTE — PLAN OF CARE
Problem: Adult Inpatient Plan of Care  Goal: Plan of Care Review  Outcome: Progressing  Goal: Optimal Comfort and Wellbeing  Outcome: Progressing     Problem: Skin Injury Risk Increased  Goal: Skin Health and Integrity  Outcome: Progressing     Problem: Wound  Goal: Skin Health and Integrity  Outcome: Progressing     Problem: Fall Injury Risk  Goal: Absence of Fall and Fall-Related Injury  Outcome: Progressing

## 2025-01-03 NOTE — CONSULTS
Chief complaint:  Palpitations (Pt states she was in a fib 1 month ago and thinks she is back in a-fib again today)      HPI:  Niurka Patel is a 84 y.o. female presenting with Stage 2 pressure ucler of the buttocks and sacrum that was POA. Pt denied knowing she had any pressure ulcers of the sacrum and did not know it was there. She was with her daughter att bedside for the examination. No other complaints today    PMH:  As per HPI and below:  No past medical history on file.    Social History     Socioeconomic History    Marital status:      Social Drivers of Health     Financial Resource Strain: Low Risk  (12/29/2024)    Overall Financial Resource Strain (CARDIA)     Difficulty of Paying Living Expenses: Not hard at all   Food Insecurity: No Food Insecurity (12/29/2024)    Hunger Vital Sign     Worried About Running Out of Food in the Last Year: Never true     Ran Out of Food in the Last Year: Never true   Transportation Needs: No Transportation Needs (12/29/2024)    TRANSPORTATION NEEDS     Transportation : No   Physical Activity: Patient Declined (12/4/2024)    Received from [facility]    Exercise Vital Sign     Days of Exercise per Week: Patient declined     Minutes of Exercise per Session: Patient declined   Recent Concern: Physical Activity - Inactive (12/3/2024)    Received from [facility] and [facility]    Exercise Vital Sign     Days of Exercise per Week: 0 days     Minutes of Exercise per Session: 0 min   Stress: No Stress Concern Present (12/28/2024)    [employer] - Occupational Stress Questionnaire     Feeling of Stress : Only a little   Housing Stability: Low Risk  (12/29/2024)    Housing Stability Vital Sign     Unable to Pay for Housing in the Last Year: No     Homeless in the Last Year: No       Past Surgical History:   Procedure Laterality Date    CARDIOVERSION  12/29/2024       No family history on  file.    Review of patient's allergies indicates:  No Known Allergies    No current facility-administered medications on file prior to encounter.     Current Outpatient Medications on File Prior to Encounter   Medication Sig Dispense Refill    atorvastatin (LIPITOR) 10 MG tablet Take 10 mg by mouth once daily.      amiodarone (PACERONE) 200 MG Tab Take 1 tablet by mouth once daily.      azelastine (ASTELIN) 137 mcg (0.1 %) nasal spray 1 spray (137 mcg total) by Nasal route 2 (two) times daily as needed for Rhinitis. 30 mL 0    benzocaine-menthoL 15-3.6 mg Lozg 1 each by Mucous Membrane route every 4 (four) hours as needed. 18 lozenge 0    benzocaine-menthoL 6-10 mg lozenge Take 1 lozenge by mouth every 2 (two) hours as needed (Sore Throat). 18 tablet 0    cetirizine (ZYRTEC) 10 MG tablet Take 10 mg by mouth.      ELIQUIS 5 mg Tab Take 5 mg by mouth 2 (two) times daily.      ergocalciferol (ERGOCALCIFEROL) 50,000 unit Cap TAKE ONE CAPSULE BY MOUTH EVERY 30 DAYS      furosemide (LASIX) 20 MG tablet Take 1 tablet every day by oral route as needed.      guaiFENesin (MUCINEX) 600 mg 12 hr tablet Take 2 tablets (1,200 mg total) by mouth 2 (two) times daily. 30 tablet 0    hydroCHLOROthiazide (MICROZIDE) 12.5 mg capsule Take 12.5 mg by mouth.      hydrOXYzine HCL (ATARAX) 25 MG tablet TAKE ONE TABLET BY MOUTH THREE TIMES DAILY FOR 5 DAYS      ibandronate (BONIVA) 150 mg tablet TAKE ONE TABLET BY MOUTH EVERY 30 DAYS IN THE MORNING      JARDIANCE 10 mg tablet Take 1 tablet by mouth once daily.      levocetirizine (XYZAL) 5 MG tablet Take 1 tablet (5 mg total) by mouth every evening. 30 tablet 0    metoprolol tartrate (LOPRESSOR) 50 MG tablet Take 1 tablet by mouth 2 (two) times daily.      pravastatin (PRAVACHOL) 10 MG tablet Take 1 tablet by mouth every evening.      pregabalin (LYRICA) 100 MG capsule Take 1 capsule by mouth 2 (two) times daily.      primidone (MYSOLINE) 50 MG Tab Take 5 tablets 3 times a day by oral route.  "     SYNTHROID 112 mcg tablet Take 1 tablet by mouth once daily.      triamcinolone acetonide 0.1% (KENALOG) 0.1 % cream APPLY A THIN LAYER TO THE AFFECTED AREA(S) BY TOPICAL ROUTE 2 TIMES PER DAY         ROS: As per HPI and below:  Pertinent items are noted in HPI.      Physical Exam:     Vitals:    25 1455 25 1557 25 1815 25 1925   BP: 118/80 131/82 106/68 (!) 88/55   BP Location:    Left arm   Patient Position:    Lying   Pulse:  (!) 126 (!) 147 (!) 113   Resp:    16   Temp:  97.5 °F (36.4 °C)  97.5 °F (36.4 °C)   TempSrc:  Oral  Oral   SpO2:  95%  95%   Weight:       Height:           BP  Min: 88/55  Max: 154/95  Temp  Av.8 °F (36.6 °C)  Min: 96.7 °F (35.9 °C)  Max: 98.6 °F (37 °C)  Pulse  Av  Min: 61  Max: 147  Resp  Av.2  Min: 10  Max: 51  SpO2  Av.5 %  Min: 87 %  Max: 100 %  Height  Av' 4" (162.6 cm)  Min: 5' 4" (162.6 cm)  Max: 5' 4" (162.6 cm)  Weight  Av.5 kg (159 lb 14.8 oz)  Min: 69.1 kg (152 lb 5.4 oz)  Max: 81.6 kg (180 lb)    Body mass index is 26.26 kg/m².          General:             Well developed, well nourished, no apparent distress  HEENT:              NCAT, no JVD, mucous membranes moist, EOM intact  Cardiovascular:  Regular rate and rhythm, normal S1, normal S2, No murmurs, rubs, or gallops  Respiratory:        Normal breath sounds, no wheezes, no rales, no rhonchi  Abdomen:           Bowel sounds present, non tender, non distended, no masses, no hepatojugular reflux  Extremities:        No clubbing, no cyanosis, no edema  Vascular:            2+ b/l radial.  Peripheral pulses intact.  No carotid bruits.  Neurological:      No focal deficits  Skin:                   No obvious rashes or erythema, Stage 2 pressure ucler of the buttocks and sacrum      Lab Results   Component Value Date    WBC 8.73 2025    HGB 12.0 2025    HCT 37.7 2025    MCV 89 2025     2025     Lab Results   Component Value Date    CHOL " 100 (L) 12/28/2024     Lab Results   Component Value Date    HDL 51 12/28/2024     Lab Results   Component Value Date    LDLCALC 38.0 (L) 12/28/2024     Lab Results   Component Value Date    TRIG 55 12/28/2024     Lab Results   Component Value Date    CHOLHDL 51.0 (H) 12/28/2024     CMP  Recent Labs   Lab 01/02/25  0611   GLU 95   CALCIUM 9.4   *   K 4.1   CO2 30*   CL 99   BUN 14   CREATININE 0.5      Lab Results   Component Value Date    TSH 10.384 (H) 12/27/2024         Assessment and Recommendations       Diagnoses:    Stage 2 pressure ucler of the buttocks and sacrum    Plan:  Triad to the buttocks and sacrum q shift    Complexity:    moderate

## 2025-01-03 NOTE — PROGRESS NOTES
[facility]  Progress Note    Patient Name: Niurka Patel  MRN: [id number]  Patient Class: IP- Inpatient   Admission Date: 12/27/2024  Length of Stay: 6 days  Attending Physician: Chon Shepard DO  Primary Care Provider: Елена Barron NP        Subjective     Principal Problem:Atrial fibrillation with RVR        HPI:  Ms. Patel is an 84 yr old female with a hx of with RVR, asthma, CHF, HTN, hypothyroidism, obesity, CVA, HLD, PE, tremors who presented to the ED with a chief complaint of palpitations.  Upon talking to patient she actually denies ever feeling palpitations or any, fluttering in her chest.  She states that over the last 4 weeks she has had increased generalized weakness and fatigue.  She also endorses productive cough with white sputum, shortness of breath, and leg swelling.  She states that her home health nurse came to check on her today and noticed that she seemed very wobbly while using her cane and advised her to come to the emergency room for further evaluation.  Patient states that she was in AFib about 1 month ago and had cardioversion on 11/26/2024 with successful conversion to normal sinus rhythm.  Per chart review, she was discharged on amiodarone 400 mg b.i.d. for 10 days then 200 mg daily afterwards.  She was also told to continue taking her metoprolol and Eliquis.  She also endorses a history of CHF denies PND and orthopnea.  Denies any recent sick contacts or home oxygen use.  Endorses tobacco use and smokes 1 pack of cigarettes a day and denies alcohol and recreational drug use.  She denies fever, chills, palpitations, diaphoresis, chest pain, abdominal pain, nausea, vomiting, diarrhea, dysuria, hematuria, lightheadedness, dizziness, and syncope.    Upon arrival to ED, patient afebrile, HR of 122, RR of 18, BP of 103/74, satting 97% on RA.  Workup in the ED revealed sodium of 124, chloride of 93, BNP of 156.  CXR with cardiomegaly.  Scattered  linear and ground-glass opacities in the mid lower lung zones.  Blunting of the costophrenic angles compatible mall components of pleural fluid.  Questionable nodular opacity in the right upper lobe near the apex.  Follow-up plain radiographs are recommended.  Patient was given aspirin 325 mg oral and initiated on diltiazem titrating gtt while in the ED. case discussed with ED provider and patient will be placed under observation for further management.    Overview/Hospital Course:  Patient was admitted for atrial fibrillation in RVR and had hyponatremia.  Patient was started on diltiazem drip.  Cardiology was consulted.  Cardioversion planned for 12/29.  Electrolytes repleted.  For hyponatremia, HCTZ was stopped. Lasix was given with caution for CHF.  Nephrology closely followed the patient.  At presentation, TSH was elevated but free T4 was normal, held off increasing Synthroid given active AFib with heart rates 130-140.  2D echocardiogram showed moderate pericardial effusion, but clinically she was not in tamponade.  Successful cardioversion to sinus rhythm with 2 shocks of 200 joules.  Diltiazem drip stopped and patient was started on low-dose beta blocker given EF reduced to 40-45% on 2D echocardiogram.  Sodium level improved, she responded well to IV Lasix. HR stable with beta blocker. Repeat ECHO ordered to monitor for pericardial effusion, which showed significant improvement and now only has trivial effusion.    Patient went back into RVR tried metoprolol and digoxin and ultimately needed amiodarone but patient able to be weaned off drip now.  Amiodarone and Cardizem stopped by Cardiology, increasing digoxin and metoprolol on an attempt to keep her rate controlled with p.o. medications.    Interval History:  Patient is seen and examined, acute complaints    Review of Systems   Respiratory:  Negative for chest tightness and shortness of breath.    Cardiovascular:  Negative for chest pain, palpitations and  leg swelling.   Gastrointestinal:  Negative for abdominal distention.     Objective:     Vital Signs (Most Recent):  Temp: 97.3 °F (36.3 °C) (01/03/25 0801)  Pulse: 104 (01/03/25 1245)  Resp: 16 (01/03/25 1245)  BP: 114/78 (01/03/25 1257)  SpO2: 96 % (01/03/25 1245) Vital Signs (24h Range):  Temp:  [97.2 °F (36.2 °C)-97.6 °F (36.4 °C)] 97.3 °F (36.3 °C)  Pulse:  [] 104  Resp:  [16-18] 16  SpO2:  [94 %-97 %] 96 %  BP: ()/(55-82) 114/78     Weight: 69.4 kg (153 lb)  Body mass index is 26.26 kg/m².    Intake/Output Summary (Last 24 hours) at 1/3/2025 1538  Last data filed at 1/3/2025 1310  Gross per 24 hour   Intake 900 ml   Output 3050 ml   Net -2150 ml         Physical Exam  Vitals reviewed.   Constitutional:       General: She is awake. She is not in acute distress.     Appearance: Normal appearance. She is not ill-appearing or diaphoretic.   Cardiovascular:      Rate and Rhythm: Normal rate and regular rhythm.      Heart sounds: Normal heart sounds.      No friction rub. No gallop.   Pulmonary:      Effort: Pulmonary effort is normal. No accessory muscle usage or respiratory distress.      Breath sounds: Rales (bibasilar) present. No wheezing (Diffuse expiratory).      Comments: Improving  Abdominal:      General: Abdomen is flat. Bowel sounds are normal.      Palpations: Abdomen is soft.      Tenderness: There is no abdominal tenderness. There is no guarding or rebound.   Musculoskeletal:      Right lower leg: Edema present.      Left lower leg: Edema present.   Skin:     General: Skin is warm and dry.   Neurological:      Mental Status: She is alert and oriented to person, place, and time.   Psychiatric:         Behavior: Behavior is cooperative.             Significant Labs: All pertinent labs within the past 24 hours have been reviewed.    Significant Imaging: I have reviewed all pertinent imaging results/findings within the past 24 hours.    Assessment and Plan     * Atrial fibrillation with  RVR  Patient has paroxysmal (<7 days) atrial fibrillation. Patient is currently in atrial fibrillation. [id number] Score: 4. The patients heart rate in the last 24 hours is as follows:  Pulse  Min: 97  Max: 141     Antiarrhythmics  metoprolol tartrate (LOPRESSOR) split tablet 12.5 mg, 2 times daily, Oral  diltiaZEM tablet 60 mg, Every 6 hours, Oral    Anticoagulants  apixaban tablet 5 mg, 2 times daily, Oral    Plan  - Replete lytes with a goal of K>4, Mg >2  - Patient is anticoagulated, see medications listed above.  - Patient's afib is currently uncontrolled. Will continue current treatment  - Underwent cardioversion on 11/26/2024 with conversion to NSR, was sent home on amio 400 mg BID for 10 days then 200 mg daily afterwards, says she has been compliant. Was also told to resume Toprol and eliquis.  - Initiated on diltiazem gtt in the ED  - Cardiology consulted  - cardioversion 12/30, diltiazem drip stopped, low-dose beta blocker started given reduced EF  - able to wean off diltiazem and amiodarone, now on digoxin and metoprolol, further recs per cardiology    Pericardial effusion  - moderate pericardial effusion on 2D echocardiogram 12/28   - Clinically not in tamponade physiology  - asymptomatic, vitals stable  - cardiology following  - caution with IV diuresis    Repeat echo 12/30 - now only has trivial effusion, significant improvement  Stable to transfer out of ICU      Hyponatremia  The patient's most recent sodium results are listed below.  Recent Labs     12/31/24  1342 01/01/25  0321 01/02/25  0611   * 132* 133*       Plan  - Correct the sodium by 4-6mEq in 24 hours.   - Urine sodium, urine osmolality, serum osmolality, AM cortisol, or TSH, T4.  - TSH elevated, Free T4 normal - needs repeat labs with synthyroid  - hold IV fluids and reduced ejection fraction  - Monitor sodium Daily.   - Patient hyponatremia is improving  - Nephrology closely following  - discontinue HCTZ at discharge  - IV lasix  "for diuresis with caution as tolerated  - monitor repeat BMP            Obesity (BMI 30-39.9)  Body mass index is 26.68 kg/m². Morbid obesity complicates all aspects of disease management from diagnostic modalities to treatment.        Hypothyroidism  - Continue home synthroid  - TSH elevated, Free T4 normal  - She will need repeat TFTs and Synthroid dose adjusting once heart rates improve.  - Repeat TSH to make sure this is not euthyroid sick syndrome, EPIC gives a hard stop        Hypertension, essential, benign  Latest blood pressure and vitals reviewed-   Temp:  [97.5 °F (36.4 °C)-98.1 °F (36.7 °C)]   Pulse:  [61-82]   Resp:  [14-35]   BP: (113-137)/(60-86)   SpO2:  [92 %-100 %] .     Home meds for hypertension were reviewed and noted below.   Hypertension Medications               furosemide (LASIX) 20 MG tablet Take 1 tablet every day by oral route as needed.    hydroCHLOROthiazide (MICROZIDE) 12.5 mg capsule Take 12.5 mg by mouth.    metoprolol tartrate (LOPRESSOR) 50 MG tablet Take 1 tablet by mouth 2 (two) times daily.          - Holding home HCTZ in the setting of hyponatremia  - Continue home metoprolol with parameters  - Lasix with caution based on BMP    Congestive heart failure  Patient has  hx of  heart failure that is Acute on chronic. On presentation their CHF was decompensated. Evidence of decompensated CHF on presentation includes: edema, dyspnea on exertion (FROST), and shortness of breath. Most recent BNP and echo results are listed below.      No results for input(s): "BNP" in the last 72 hours.    Latest ECHO  No results found for this or any previous visit.    Current Heart Failure Medications  furosemide injection 20 mg, Daily, Intravenous    Plan  - Monitor strict I&Os and daily weights.    - Place on telemetry  - Low sodium diet  - Place on fluid restriction of 1.5 L.   - Cardiology has been consulted  - The patient's volume status is stable but not at their baseline as indicated by edema, " crackles on lung auscultation, dyspnea on exertion (FROST), and shortness of breath  - on review of pt's last echo, revealed normal EF and undetermined diastolic function.  - Cardiology consulted, appreciate recs  - was on IV Lasix, was responding well  - hold hydrochlorothiazide with hyponatremia.    Lasix held given concern of new CED, restart depending on repeat labs        Asthma  - DuoNebs Q6H PRN  - prednisone 20 mg p.o. daily for minimal wheezing      VTE Risk Mitigation (From admission, onward)           Ordered     apixaban tablet 5 mg  2 times daily         12/27/24 1807     Reason for No Pharmacological VTE Prophylaxis  Once        Question:  Reasons:  Answer:  Already adequately anticoagulated on oral Anticoagulants    12/27/24 1748     IP VTE HIGH RISK PATIENT  Once         12/27/24 1748     Place sequential compression device  Until discontinued         12/27/24 1748                    Discharge Planning   CYNTHIA: 1/6/2025     Code Status: Full Code   Medical Readiness for Discharge Date:   Discharge Plan A: Home Health   Discharge Delays: Orders Needed    Treatment, consult recommendation, PT/OT, dispo planning                Chon Shepard DO  Department of Hospital Medicine   [facility]

## 2025-01-03 NOTE — SUBJECTIVE & OBJECTIVE
Interval History:  Patient is seen and examined, acute complaints    Review of Systems   Respiratory:  Negative for chest tightness and shortness of breath.    Cardiovascular:  Negative for chest pain, palpitations and leg swelling.   Gastrointestinal:  Negative for abdominal distention.     Objective:     Vital Signs (Most Recent):  Temp: 97.3 °F (36.3 °C) (01/03/25 0801)  Pulse: 104 (01/03/25 1245)  Resp: 16 (01/03/25 1245)  BP: 114/78 (01/03/25 1257)  SpO2: 96 % (01/03/25 1245) Vital Signs (24h Range):  Temp:  [97.2 °F (36.2 °C)-97.6 °F (36.4 °C)] 97.3 °F (36.3 °C)  Pulse:  [] 104  Resp:  [16-18] 16  SpO2:  [94 %-97 %] 96 %  BP: ()/(55-82) 114/78     Weight: 69.4 kg (153 lb)  Body mass index is 26.26 kg/m².    Intake/Output Summary (Last 24 hours) at 1/3/2025 1538  Last data filed at 1/3/2025 1310  Gross per 24 hour   Intake 900 ml   Output 3050 ml   Net -2150 ml         Physical Exam  Vitals reviewed.   Constitutional:       General: She is awake. She is not in acute distress.     Appearance: Normal appearance. She is not ill-appearing or diaphoretic.   Cardiovascular:      Rate and Rhythm: Normal rate and regular rhythm.      Heart sounds: Normal heart sounds.      No friction rub. No gallop.   Pulmonary:      Effort: Pulmonary effort is normal. No accessory muscle usage or respiratory distress.      Breath sounds: Rales (bibasilar) present. No wheezing (Diffuse expiratory).      Comments: Improving  Abdominal:      General: Abdomen is flat. Bowel sounds are normal.      Palpations: Abdomen is soft.      Tenderness: There is no abdominal tenderness. There is no guarding or rebound.   Musculoskeletal:      Right lower leg: Edema present.      Left lower leg: Edema present.   Skin:     General: Skin is warm and dry.   Neurological:      Mental Status: She is alert and oriented to person, place, and time.   Psychiatric:         Behavior: Behavior is cooperative.             Significant Labs: All  pertinent labs within the past 24 hours have been reviewed.    Significant Imaging: I have reviewed all pertinent imaging results/findings within the past 24 hours.

## 2025-01-03 NOTE — NURSING
Notified Dr Siddiqui of -130s and going into the 150s. BP 88/55. Ordered Amio 0.5 mg/hr. Repeated and verified.

## 2025-01-03 NOTE — PLAN OF CARE
01/02/25 2115   Patient Assessment/Suction   Level of Consciousness (AVPU) alert   Respiratory Effort Normal;Unlabored   Expansion/Accessory Muscles/Retractions no use of accessory muscles   All Lung Fields Breath Sounds Anterior:;Lateral:;diminished   Rhythm/Pattern, Respiratory pattern regular   Cough Frequency no cough   Skin Integrity   $ Wound Care Tech Time 15 min   Area Observed Left;Right;Behind ear;Cheek;Upper lip;Nares   Skin Appearance without discoloration   PRE-TX-O2   Device (Oxygen Therapy) nasal cannula   $ Is the patient on Low Flow Oxygen? Yes   Flow (L/min) (Oxygen Therapy) 2   Oxygen Concentration (%) 28   SpO2 (!) 94 %   Pulse Oximetry Type Intermittent   $ Pulse Oximetry - Multiple Charge Pulse Oximetry - Multiple   Pulse (!) 125   Resp 18   Aerosol Therapy   $ Aerosol Therapy Charges PRN treatment not required   Respiratory Treatment Status (SVN) PRN treatment not required   General Safety Checklist   Safety Promotion/Fall Prevention side rails raised   Ready to Wean/Extubation Screen   FIO2<=50 (chart decimal) 0.28   Education   $ Education Bronchodilator;Oxygen;15 min

## 2025-01-04 DIAGNOSIS — I48.91 ATRIAL FIBRILLATION WITH RVR: Primary | ICD-10-CM

## 2025-01-04 LAB
ANION GAP SERPL CALC-SCNC: 5 MMOL/L (ref 8–16)
BASOPHILS # BLD AUTO: 0.08 K/UL (ref 0–0.2)
BASOPHILS NFR BLD: 0.7 % (ref 0–1.9)
BUN SERPL-MCNC: 11 MG/DL (ref 8–23)
CALCIUM SERPL-MCNC: 9.2 MG/DL (ref 8.7–10.5)
CHLORIDE SERPL-SCNC: 100 MMOL/L (ref 95–110)
CO2 SERPL-SCNC: 30 MMOL/L (ref 23–29)
CREAT SERPL-MCNC: 0.5 MG/DL (ref 0.5–1.4)
DIFFERENTIAL METHOD BLD: ABNORMAL
EOSINOPHIL # BLD AUTO: 0.3 K/UL (ref 0–0.5)
EOSINOPHIL NFR BLD: 3 % (ref 0–8)
ERYTHROCYTE [DISTWIDTH] IN BLOOD BY AUTOMATED COUNT: 15 % (ref 11.5–14.5)
EST. GFR  (NO RACE VARIABLE): >60 ML/MIN/1.73 M^2
GLUCOSE SERPL-MCNC: 85 MG/DL (ref 70–110)
HCT VFR BLD AUTO: 40.7 % (ref 37–48.5)
HGB BLD-MCNC: 13.2 G/DL (ref 12–16)
IMM GRANULOCYTES # BLD AUTO: 0.07 K/UL (ref 0–0.04)
IMM GRANULOCYTES NFR BLD AUTO: 0.6 % (ref 0–0.5)
LYMPHOCYTES # BLD AUTO: 2 K/UL (ref 1–4.8)
LYMPHOCYTES NFR BLD: 18.5 % (ref 18–48)
MAGNESIUM SERPL-MCNC: 1.9 MG/DL (ref 1.6–2.6)
MCH RBC QN AUTO: 28 PG (ref 27–31)
MCHC RBC AUTO-ENTMCNC: 32.4 G/DL (ref 32–36)
MCV RBC AUTO: 86 FL (ref 82–98)
MONOCYTES # BLD AUTO: 0.9 K/UL (ref 0.3–1)
MONOCYTES NFR BLD: 8.5 % (ref 4–15)
NEUTROPHILS # BLD AUTO: 7.6 K/UL (ref 1.8–7.7)
NEUTROPHILS NFR BLD: 68.7 % (ref 38–73)
NRBC BLD-RTO: 0 /100 WBC
PHOSPHATE SERPL-MCNC: 2.6 MG/DL (ref 2.7–4.5)
PLATELET # BLD AUTO: 473 K/UL (ref 150–450)
PMV BLD AUTO: 8.9 FL (ref 9.2–12.9)
POTASSIUM SERPL-SCNC: 3.9 MMOL/L (ref 3.5–5.1)
RBC # BLD AUTO: 4.71 M/UL (ref 4–5.4)
SODIUM SERPL-SCNC: 135 MMOL/L (ref 136–145)
WBC # BLD AUTO: 11.05 K/UL (ref 3.9–12.7)

## 2025-01-04 PROCEDURE — 99900035 HC TECH TIME PER 15 MIN (STAT)

## 2025-01-04 PROCEDURE — 99900031 HC PATIENT EDUCATION (STAT)

## 2025-01-04 PROCEDURE — 25000003 PHARM REV CODE 250

## 2025-01-04 PROCEDURE — 94761 N-INVAS EAR/PLS OXIMETRY MLT: CPT

## 2025-01-04 PROCEDURE — 21400001 HC TELEMETRY ROOM

## 2025-01-04 PROCEDURE — 25000003 PHARM REV CODE 250: Performed by: INTERNAL MEDICINE

## 2025-01-04 PROCEDURE — 85025 COMPLETE CBC W/AUTO DIFF WBC: CPT

## 2025-01-04 PROCEDURE — 83735 ASSAY OF MAGNESIUM: CPT

## 2025-01-04 PROCEDURE — 63600175 PHARM REV CODE 636 W HCPCS

## 2025-01-04 PROCEDURE — 36415 COLL VENOUS BLD VENIPUNCTURE: CPT

## 2025-01-04 PROCEDURE — 27000221 HC OXYGEN, UP TO 24 HOURS

## 2025-01-04 PROCEDURE — 99233 SBSQ HOSP IP/OBS HIGH 50: CPT | Mod: ,,, | Performed by: INTERNAL MEDICINE

## 2025-01-04 PROCEDURE — 84100 ASSAY OF PHOSPHORUS: CPT

## 2025-01-04 PROCEDURE — 80048 BASIC METABOLIC PNL TOTAL CA: CPT

## 2025-01-04 RX ORDER — DILTIAZEM HYDROCHLORIDE 120 MG/1
120 CAPSULE, COATED, EXTENDED RELEASE ORAL 2 TIMES DAILY
Status: DISCONTINUED | OUTPATIENT
Start: 2025-01-04 | End: 2025-01-05 | Stop reason: HOSPADM

## 2025-01-04 RX ORDER — FUROSEMIDE 20 MG/1
20 TABLET ORAL DAILY
Status: DISCONTINUED | OUTPATIENT
Start: 2025-01-04 | End: 2025-01-05 | Stop reason: HOSPADM

## 2025-01-04 RX ORDER — DILTIAZEM HYDROCHLORIDE 120 MG/1
120 CAPSULE, COATED, EXTENDED RELEASE ORAL DAILY
Status: DISCONTINUED | OUTPATIENT
Start: 2025-01-04 | End: 2025-01-04

## 2025-01-04 RX ADMIN — ACETAMINOPHEN 650 MG: 325 TABLET ORAL at 07:01

## 2025-01-04 RX ADMIN — PREGABALIN 100 MG: 75 CAPSULE ORAL at 08:01

## 2025-01-04 RX ADMIN — PRAVASTATIN SODIUM 10 MG: 10 TABLET ORAL at 08:01

## 2025-01-04 RX ADMIN — LEVOTHYROXINE SODIUM 112 MCG: 112 TABLET ORAL at 05:01

## 2025-01-04 RX ADMIN — APIXABAN 5 MG: 5 TABLET, FILM COATED ORAL at 08:01

## 2025-01-04 RX ADMIN — DILTIAZEM HYDROCHLORIDE 120 MG: 120 CAPSULE, COATED, EXTENDED RELEASE ORAL at 08:01

## 2025-01-04 RX ADMIN — APIXABAN 2.5 MG: 2.5 TABLET, FILM COATED ORAL at 08:01

## 2025-01-04 RX ADMIN — METOPROLOL TARTRATE 50 MG: 50 TABLET, FILM COATED ORAL at 08:01

## 2025-01-04 RX ADMIN — DILTIAZEM HYDROCHLORIDE 60 MG: 60 TABLET, FILM COATED ORAL at 12:01

## 2025-01-04 RX ADMIN — DIGOXIN 0.12 MG: 125 TABLET ORAL at 08:01

## 2025-01-04 RX ADMIN — FUROSEMIDE 20 MG: 20 TABLET ORAL at 08:01

## 2025-01-04 RX ADMIN — PREDNISONE 20 MG: 20 TABLET ORAL at 08:01

## 2025-01-04 RX ADMIN — DILTIAZEM HYDROCHLORIDE 120 MG: 120 CAPSULE, COATED, EXTENDED RELEASE ORAL at 01:01

## 2025-01-04 RX ADMIN — DILTIAZEM HYDROCHLORIDE 60 MG: 60 TABLET, FILM COATED ORAL at 05:01

## 2025-01-04 NOTE — PROGRESS NOTES
[facility] Medicine  Progress Note    Patient Name: Niurka Patel  MRN: [id number]  Patient Class: IP- Inpatient   Admission Date: 12/27/2024  Length of Stay: 7 days  Attending Physician: Chon Shepard DO  Primary Care Provider: Елена Barron NP        Subjective     Principal Problem:Atrial fibrillation with RVR        HPI:  Ms. Patel is an 84 yr old female with a hx of with RVR, asthma, CHF, HTN, hypothyroidism, obesity, CVA, HLD, PE, tremors who presented to the ED with a chief complaint of palpitations.  Upon talking to patient she actually denies ever feeling palpitations or any, fluttering in her chest.  She states that over the last 4 weeks she has had increased generalized weakness and fatigue.  She also endorses productive cough with white sputum, shortness of breath, and leg swelling.  She states that her home health nurse came to check on her today and noticed that she seemed very wobbly while using her cane and advised her to come to the emergency room for further evaluation.  Patient states that she was in AFib about 1 month ago and had cardioversion on 11/26/2024 with successful conversion to normal sinus rhythm.  Per chart review, she was discharged on amiodarone 400 mg b.i.d. for 10 days then 200 mg daily afterwards.  She was also told to continue taking her metoprolol and Eliquis.  She also endorses a history of CHF denies PND and orthopnea.  Denies any recent sick contacts or home oxygen use.  Endorses tobacco use and smokes 1 pack of cigarettes a day and denies alcohol and recreational drug use.  She denies fever, chills, palpitations, diaphoresis, chest pain, abdominal pain, nausea, vomiting, diarrhea, dysuria, hematuria, lightheadedness, dizziness, and syncope.    Upon arrival to ED, patient afebrile, HR of 122, RR of 18, BP of 103/74, satting 97% on RA.  Workup in the ED revealed sodium of 124, chloride of 93, BNP of 156.  CXR with cardiomegaly.  Scattered  linear and ground-glass opacities in the mid lower lung zones.  Blunting of the costophrenic angles compatible mall components of pleural fluid.  Questionable nodular opacity in the right upper lobe near the apex.  Follow-up plain radiographs are recommended.  Patient was given aspirin 325 mg oral and initiated on diltiazem titrating gtt while in the ED. case discussed with ED provider and patient will be placed under observation for further management.    Overview/Hospital Course:  Patient was admitted for atrial fibrillation in RVR and had hyponatremia.  Patient was started on diltiazem drip.  Cardiology was consulted.  Cardioversion planned for 12/29.  Electrolytes repleted.  For hyponatremia, HCTZ was stopped. Lasix was given with caution for CHF.  Nephrology closely followed the patient.  At presentation, TSH was elevated but free T4 was normal, held off increasing Synthroid given active AFib with heart rates 130-140.  2D echocardiogram showed moderate pericardial effusion, but clinically she was not in tamponade.  Successful cardioversion to sinus rhythm with 2 shocks of 200 joules.  Diltiazem drip stopped and patient was started on low-dose beta blocker given EF reduced to 40-45% on 2D echocardiogram.  Sodium level improved, she responded well to IV Lasix. HR stable with beta blocker. Repeat ECHO ordered to monitor for pericardial effusion, which showed significant improvement and now only has trivial effusion.    Patient went back into RVR tried metoprolol and digoxin and ultimately needed amiodarone but patient able to be weaned off drip now.  Amiodarone and Cardizem stopped by Cardiology, increasing digoxin and metoprolol on an attempt to keep her rate controlled with p.o. medications.  Long discussion with patient today and since she has been asymptomatic this whole time and we are not able to control her heart rate except for on amiodarone GTT, may need ablation but a bad candidate.  Patient is agreeable  to go home with palliative care and home hospice consult and Cardiology agrees as patient feels like we are and I have able to do much for her which is not wrong.  Plan to DC with home palliative care and hospice consult tomorrow.    Interval History:  Patient is seen and examined, no acute complaints    Review of Systems   Respiratory:  Negative for chest tightness and shortness of breath.    Cardiovascular:  Negative for chest pain, palpitations and leg swelling.   Gastrointestinal:  Negative for abdominal distention.     Objective:     Vital Signs (Most Recent):  Temp: 97.6 °F (36.4 °C) (01/04/25 1215)  Pulse: 103 (01/04/25 1215)  Resp: 18 (01/04/25 1215)  BP: 109/73 (01/04/25 1335)  SpO2: 95 % (01/04/25 1215) Vital Signs (24h Range):  Temp:  [97.5 °F (36.4 °C)-97.9 °F (36.6 °C)] 97.6 °F (36.4 °C)  Pulse:  [] 103  Resp:  [16-20] 18  SpO2:  [93 %-96 %] 95 %  BP: (102-123)/(67-81) 109/73     Weight: 68.4 kg (150 lb 12.7 oz)  Body mass index is 25.88 kg/m².    Intake/Output Summary (Last 24 hours) at 1/4/2025 1600  Last data filed at 1/4/2025 1440  Gross per 24 hour   Intake 1628 ml   Output 2750 ml   Net -1122 ml         Physical Exam  Vitals reviewed.   Constitutional:       General: She is awake. She is not in acute distress.     Appearance: Normal appearance. She is not ill-appearing or diaphoretic.   Cardiovascular:      Rate and Rhythm: Normal rate and regular rhythm.      Heart sounds: Normal heart sounds.      No friction rub. No gallop.   Pulmonary:      Effort: Pulmonary effort is normal. No accessory muscle usage or respiratory distress.      Breath sounds: Rales (bibasilar) present. No wheezing (Diffuse expiratory).      Comments: Improving  Abdominal:      General: Abdomen is flat. Bowel sounds are normal.      Palpations: Abdomen is soft.      Tenderness: There is no abdominal tenderness. There is no guarding or rebound.   Musculoskeletal:      Right lower leg: Edema present.      Left lower leg:  Edema present.   Skin:     General: Skin is warm and dry.   Neurological:      Mental Status: She is alert and oriented to person, place, and time.   Psychiatric:         Behavior: Behavior is cooperative.             Significant Labs: All pertinent labs within the past 24 hours have been reviewed.    Significant Imaging: I have reviewed all pertinent imaging results/findings within the past 24 hours.    Assessment and Plan     * Atrial fibrillation with RVR  Patient has paroxysmal (<7 days) atrial fibrillation. Patient is currently in atrial fibrillation. [id number] Score: 4. The patients heart rate in the last 24 hours is as follows:  Pulse  Min: 97  Max: 141     Antiarrhythmics  metoprolol tartrate (LOPRESSOR) split tablet 12.5 mg, 2 times daily, Oral  diltiaZEM tablet 60 mg, Every 6 hours, Oral    Anticoagulants  apixaban tablet 5 mg, 2 times daily, Oral    Plan  - Replete lytes with a goal of K>4, Mg >2  - Patient is anticoagulated, see medications listed above.  - Patient's afib is currently uncontrolled. Will continue current treatment  - Underwent cardioversion on 11/26/2024 with conversion to NSR, was sent home on amio 400 mg BID for 10 days then 200 mg daily afterwards, says she has been compliant. Was also told to resume Toprol and eliquis.  - Initiated on diltiazem gtt in the ED  - Cardiology consulted  - cardioversion 12/30, diltiazem drip stopped, low-dose beta blocker started given reduced EF  - able to wean off diltiazem and amiodarone, now on digoxin and metoprolol, further recs per cardiology    Pericardial effusion  - moderate pericardial effusion on 2D echocardiogram 12/28   - Clinically not in tamponade physiology  - asymptomatic, vitals stable  - cardiology following  - caution with IV diuresis    Repeat echo 12/30 - now only has trivial effusion, significant improvement  Stable to transfer out of ICU      Hyponatremia  The patient's most recent sodium results are listed below.  Recent Labs      12/31/24  1342 01/01/25  0321 01/02/25  0611   * 132* 133*       Plan  - Correct the sodium by 4-6mEq in 24 hours.   - Urine sodium, urine osmolality, serum osmolality, AM cortisol, or TSH, T4.  - TSH elevated, Free T4 normal - needs repeat labs with synthyroid  - hold IV fluids and reduced ejection fraction  - Monitor sodium Daily.   - Patient hyponatremia is improving  - Nephrology closely following  - discontinue HCTZ at discharge  - IV lasix for diuresis with caution as tolerated  - monitor repeat BMP            Obesity (BMI 30-39.9)  Body mass index is 26.68 kg/m². Morbid obesity complicates all aspects of disease management from diagnostic modalities to treatment.        Hypothyroidism  - Continue home synthroid  - TSH elevated, Free T4 normal  - She will need repeat TFTs and Synthroid dose adjusting once heart rates improve.  - Repeat TSH to make sure this is not euthyroid sick syndrome, EPIC gives a hard stop        Hypertension, essential, benign  Latest blood pressure and vitals reviewed-   Temp:  [97.5 °F (36.4 °C)-98.1 °F (36.7 °C)]   Pulse:  [61-82]   Resp:  [14-35]   BP: (113-137)/(60-86)   SpO2:  [92 %-100 %] .     Home meds for hypertension were reviewed and noted below.   Hypertension Medications               furosemide (LASIX) 20 MG tablet Take 1 tablet every day by oral route as needed.    hydroCHLOROthiazide (MICROZIDE) 12.5 mg capsule Take 12.5 mg by mouth.    metoprolol tartrate (LOPRESSOR) 50 MG tablet Take 1 tablet by mouth 2 (two) times daily.          - Holding home HCTZ in the setting of hyponatremia  - Continue home metoprolol with parameters  - Lasix with caution based on BMP    Congestive heart failure  Patient has  hx of  heart failure that is Acute on chronic. On presentation their CHF was decompensated. Evidence of decompensated CHF on presentation includes: edema, dyspnea on exertion (FROST), and shortness of breath. Most recent BNP and echo results are listed below.      No  "results for input(s): "BNP" in the last 72 hours.    Latest ECHO  No results found for this or any previous visit.    Current Heart Failure Medications  furosemide injection 20 mg, Daily, Intravenous    Plan  - Monitor strict I&Os and daily weights.    - Place on telemetry  - Low sodium diet  - Place on fluid restriction of 1.5 L.   - Cardiology has been consulted  - The patient's volume status is stable but not at their baseline as indicated by edema, crackles on lung auscultation, dyspnea on exertion (FROST), and shortness of breath  - on review of pt's last echo, revealed normal EF and undetermined diastolic function.  - Cardiology consulted, razia poole  - was on IV Lasix, was responding well  - hold hydrochlorothiazide with hyponatremia.    Lasix held given concern of new CED, restart depending on repeat labs        Asthma  - DuoNebs Q6H PRN  - prednisone 20 mg p.o. daily for minimal wheezing      VTE Risk Mitigation (From admission, onward)           Ordered     apixaban tablet 2.5 mg  2 times daily         01/04/25 1056     Reason for No Pharmacological VTE Prophylaxis  Once        Question:  Reasons:  Answer:  Already adequately anticoagulated on oral Anticoagulants    12/27/24 1748     IP VTE HIGH RISK PATIENT  Once         12/27/24 1748     Place sequential compression device  Until discontinued         12/27/24 1748                    Discharge Planning   CYNTHIA: 1/7/2025     Code Status: DNR   Medical Readiness for Discharge Date:   Discharge Plan A: Home Health   Discharge Delays: Orders Needed      Treatment, consult recommendation, PT/OT, dispo planning.              Chon Shepard DO  Department of Hospital Medicine   [facility]    "

## 2025-01-04 NOTE — SUBJECTIVE & OBJECTIVE
Interval History:  Patient is seen and examined, no acute complaints    Review of Systems   Respiratory:  Negative for chest tightness and shortness of breath.    Cardiovascular:  Negative for chest pain, palpitations and leg swelling.   Gastrointestinal:  Negative for abdominal distention.     Objective:     Vital Signs (Most Recent):  Temp: 97.6 °F (36.4 °C) (01/04/25 1215)  Pulse: 103 (01/04/25 1215)  Resp: 18 (01/04/25 1215)  BP: 109/73 (01/04/25 1335)  SpO2: 95 % (01/04/25 1215) Vital Signs (24h Range):  Temp:  [97.5 °F (36.4 °C)-97.9 °F (36.6 °C)] 97.6 °F (36.4 °C)  Pulse:  [] 103  Resp:  [16-20] 18  SpO2:  [93 %-96 %] 95 %  BP: (102-123)/(67-81) 109/73     Weight: 68.4 kg (150 lb 12.7 oz)  Body mass index is 25.88 kg/m².    Intake/Output Summary (Last 24 hours) at 1/4/2025 1600  Last data filed at 1/4/2025 1440  Gross per 24 hour   Intake 1628 ml   Output 2750 ml   Net -1122 ml         Physical Exam  Vitals reviewed.   Constitutional:       General: She is awake. She is not in acute distress.     Appearance: Normal appearance. She is not ill-appearing or diaphoretic.   Cardiovascular:      Rate and Rhythm: Normal rate and regular rhythm.      Heart sounds: Normal heart sounds.      No friction rub. No gallop.   Pulmonary:      Effort: Pulmonary effort is normal. No accessory muscle usage or respiratory distress.      Breath sounds: Rales (bibasilar) present. No wheezing (Diffuse expiratory).      Comments: Improving  Abdominal:      General: Abdomen is flat. Bowel sounds are normal.      Palpations: Abdomen is soft.      Tenderness: There is no abdominal tenderness. There is no guarding or rebound.   Musculoskeletal:      Right lower leg: Edema present.      Left lower leg: Edema present.   Skin:     General: Skin is warm and dry.   Neurological:      Mental Status: She is alert and oriented to person, place, and time.   Psychiatric:         Behavior: Behavior is cooperative.             Significant  Labs: All pertinent labs within the past 24 hours have been reviewed.    Significant Imaging: I have reviewed all pertinent imaging results/findings within the past 24 hours.

## 2025-01-04 NOTE — PROGRESS NOTES
[facility]  Department of Cardiology  Consult Note      PATIENT NAME: Niurka Patel    MRN: [id number]  TODAY'S DATE: 01/04/2025  ADMIT DATE: 12/27/2024                          CONSULT REQUESTED BY: Chon Shepard DO    SUBJECTIVE     PRINCIPAL PROBLEM: Atrial fibrillation with RVR    INTERVAL HISTORY    01/04/2025  Pt remains in Afib in 70-100s.  Cardizem and amiodarone drips are now off as of yesterday afternoon.  Focusing on rate control at this time.  Seen up in chair with family at bedside.    1/3/25  Patient is back on diltiazem and amiodarone gtts.  Sitting in chair.  Denies complaints.      1/2/25    Resting in bed. NAD. Denies complaints.  Remains on cardizem and amiodarone gtt.      01/01/2025:  Patient went into AFib with RVR again.  Started on Cardizem drip.    12/30/24    Resting in chair. NAD. 2L NC. S/p RODNEY/CV yest. Remains in SR.  Denies complaints. VSS, labs reviewed, stable. -1.4 L yest. Wheezing present.  Repeat echo stable.  Trivial effusion        HPI:    Ms. Patel is an 84 year old female who presented to the ER with complaints of palpitations. She has a known hx of PAF and was cardioverted about a month ago. She was found to be in Afib with RVR on arrival  as well as some evidence of CHF.  Patient seen resting in bed with no acute distress noted.  Seen lying relatively comfortably near flat.  She has been seen by Nephrology for hyponatremia.  Patient had been on amiodarone outpatient is noted to be hypothyroid and thus amiodarone has not been continued.      REASON FOR CONSULT:  From Hospitalist H&P: Ms. Patel is an 84 yr old female with a hx of with RVR, asthma, CHF, HTN, hypothyroidism, obesity, CVA, HLD, PE, tremors who presented to the ED with a chief complaint of palpitations.  Upon talking to patient she actually denies ever feeling palpitations or any, fluttering in her chest.  She states that over the last 4 weeks she has had increased generalized weakness and  fatigue.  She also endorses productive cough with white sputum, shortness of breath, and leg swelling.  She states that her home health nurse came to check on her today and noticed that she seemed very wobbly while using her cane and advised her to come to the emergency room for further evaluation.  Patient states that she was in AFib about 1 month ago and had cardioversion on 11/26/2024 with successful conversion to normal sinus rhythm.  Per chart review, she was discharged on amiodarone 400 mg b.i.d. for 10 days then 200 mg daily afterwards.  She was also told to continue taking her metoprolol and Eliquis.  She also endorses a history of CHF denies PND and orthopnea.  Denies any recent sick contacts or home oxygen use.  Endorses tobacco use and smokes 1 pack of cigarettes a day and denies alcohol and recreational drug use.  She denies fever, chills, palpitations, diaphoresis, chest pain, abdominal pain, nausea, vomiting, diarrhea, dysuria, hematuria, lightheadedness, dizziness, and syncope.     Upon arrival to ED, patient afebrile, HR of 122, RR of 18, BP of 103/74, satting 97% on RA.  Workup in the ED revealed sodium of 124, chloride of 93, BNP of 156.  CXR with cardiomegaly.  Scattered linear and ground-glass opacities in the mid lower lung zones.  Blunting of the costophrenic angles compatible mall components of pleural fluid.  Questionable nodular opacity in the right upper lobe near the apex.  Follow-up plain radiographs are recommended.  Patient was given aspirin 325 mg oral and initiated on diltiazem titrating gtt while in the ED. case discussed with ED provider and patient will be placed under observation for further management.     No past medical history on file.     No past surgical history on file.     Review of patient's allergies indicates:  No Known Allergies     No current facility-administered medications on file prior to encounter.         Review of patient's allergies indicates:  No Known  Allergies    No past medical history on file.  Past Surgical History:   Procedure Laterality Date    CARDIOVERSION  12/29/2024           REVIEW OF SYSTEMS  Per HPI    OBJECTIVE     VITAL SIGNS (Most Recent)  Temp: 97.6 °F (36.4 °C) (01/04/25 1215)  Pulse: 103 (01/04/25 1215)  Resp: 18 (01/04/25 1215)  BP: 109/73 (01/04/25 1335)  SpO2: 95 % (01/04/25 1215)    VENTILATION STATUS  Resp: 18 (01/04/25 1215)  SpO2: 95 % (01/04/25 1215)  Oxygen Concentration (%):  [24] 24        I & O (Last 24H):  Intake/Output Summary (Last 24 hours) at 1/4/2025 1356  Last data filed at 1/4/2025 0613  Gross per 24 hour   Intake 1388 ml   Output 2750 ml   Net -1362 ml       WEIGHTS  Wt Readings from Last 1 Encounters:   01/04/25 0600 68.4 kg (150 lb 12.7 oz)   01/02/25 0438 69.4 kg (153 lb)   12/31/24 2123 69.1 kg (152 lb 5.4 oz)   12/30/24 0514 70.5 kg (155 lb 6.8 oz)   12/29/24 0410 70.9 kg (156 lb 4.9 oz)   12/28/24 0530 73.7 kg (162 lb 7.7 oz)   12/27/24 1528 81.6 kg (180 lb)       PHYSICAL EXAM  CONSTITUTIONAL: NAD, elderly frail female  HEENT: Normocephalic               NECK:  No JVD   CVS: S1S2+, iRRR  LUNGS: Clear  ABDOMEN: nondistended   EXTREMITIES: mild edema   NEURO: AAO X 3  PSY: Normal affect      HOME MEDICATIONS:  No current facility-administered medications on file prior to encounter.     Current Outpatient Medications on File Prior to Encounter   Medication Sig Dispense Refill    atorvastatin (LIPITOR) 10 MG tablet Take 10 mg by mouth once daily.      amiodarone (PACERONE) 200 MG Tab Take 1 tablet by mouth once daily.      azelastine (ASTELIN) 137 mcg (0.1 %) nasal spray 1 spray (137 mcg total) by Nasal route 2 (two) times daily as needed for Rhinitis. 30 mL 0    benzocaine-menthoL 15-3.6 mg Lozg 1 each by Mucous Membrane route every 4 (four) hours as needed. 18 lozenge 0    benzocaine-menthoL 6-10 mg lozenge Take 1 lozenge by mouth every 2 (two) hours as needed (Sore Throat). 18 tablet 0    cetirizine (ZYRTEC) 10 MG  tablet Take 10 mg by mouth.      ELIQUIS 5 mg Tab Take 5 mg by mouth 2 (two) times daily.      ergocalciferol (ERGOCALCIFEROL) 50,000 unit Cap TAKE ONE CAPSULE BY MOUTH EVERY 30 DAYS      furosemide (LASIX) 20 MG tablet Take 1 tablet every day by oral route as needed.      guaiFENesin (MUCINEX) 600 mg 12 hr tablet Take 2 tablets (1,200 mg total) by mouth 2 (two) times daily. 30 tablet 0    hydroCHLOROthiazide (MICROZIDE) 12.5 mg capsule Take 12.5 mg by mouth.      hydrOXYzine HCL (ATARAX) 25 MG tablet TAKE ONE TABLET BY MOUTH THREE TIMES DAILY FOR 5 DAYS      ibandronate (BONIVA) 150 mg tablet TAKE ONE TABLET BY MOUTH EVERY 30 DAYS IN THE MORNING      JARDIANCE 10 mg tablet Take 1 tablet by mouth once daily.      levocetirizine (XYZAL) 5 MG tablet Take 1 tablet (5 mg total) by mouth every evening. 30 tablet 0    metoprolol tartrate (LOPRESSOR) 50 MG tablet Take 1 tablet by mouth 2 (two) times daily.      pravastatin (PRAVACHOL) 10 MG tablet Take 1 tablet by mouth every evening.      pregabalin (LYRICA) 100 MG capsule Take 1 capsule by mouth 2 (two) times daily.      primidone (MYSOLINE) 50 MG Tab Take 5 tablets 3 times a day by oral route.      SYNTHROID 112 mcg tablet Take 1 tablet by mouth once daily.      triamcinolone acetonide 0.1% (KENALOG) 0.1 % cream APPLY A THIN LAYER TO THE AFFECTED AREA(S) BY TOPICAL ROUTE 2 TIMES PER DAY         SCHEDULED MEDS:   apixaban  2.5 mg Oral BID    digoxin  0.125 mg Oral Daily    diltiaZEM  120 mg Oral Daily    furosemide  20 mg Oral Daily    levothyroxine  112 mcg Oral Before breakfast    metoprolol tartrate  50 mg Oral BID    pravastatin  10 mg Oral QHS    predniSONE  20 mg Oral Daily    pregabalin  100 mg Oral BID       CONTINUOUS INFUSIONS:   dilTIAZem  10 mg/hr Intravenous Continuous   Stopped at 01/03/25 1420           PRN MEDS:  Current Facility-Administered Medications:     acetaminophen, 650 mg, Oral, Q4H PRN    albuterol-ipratropium, 3 mL, Nebulization, Q4H PRN     "aluminum-magnesium hydroxide-simethicone, 30 mL, Oral, QID PRN    dextrose 50%, 12.5 g, Intravenous, PRN    dextrose 50%, 25 g, Intravenous, PRN    glucagon (human recombinant), 1 mg, Intramuscular, PRN    glucose, 16 g, Oral, PRN    glucose, 24 g, Oral, PRN    magnesium oxide, 800 mg, Oral, PRN    magnesium oxide, 800 mg, Oral, PRN    melatonin, 6 mg, Oral, Nightly PRN    naloxone, 0.02 mg, Intravenous, PRN    ondansetron, 4 mg, Intravenous, Q6H PRN    potassium bicarbonate, 35 mEq, Oral, PRN    potassium bicarbonate, 50 mEq, Oral, PRN    potassium bicarbonate, 60 mEq, Oral, PRN    potassium, sodium phosphates, 2 packet, Oral, PRN    potassium, sodium phosphates, 2 packet, Oral, PRN    potassium, sodium phosphates, 2 packet, Oral, PRN    senna-docusate 8.6-50 mg, 1 tablet, Oral, BID PRN    sodium chloride 0.9%, 10 mL, Intravenous, Q12H PRN    LABS AND DIAGNOSTICS     CBC LAST 3 DAYS  Recent Labs   Lab 01/02/25  0945 01/03/25  0505 01/04/25  0435   WBC 8.73 10.48 11.05   RBC 4.24 4.50 4.71   HGB 12.0 12.9 13.2   HCT 37.7 39.1 40.7   MCV 89 87 86   MCH 28.3 28.7 28.0   MCHC 31.8* 33.0 32.4   RDW 15.2* 15.0* 15.0*    458* 473*   MPV 9.5 8.7* 8.9*   GRAN 64.1  5.6 66.5  7.0 68.7  7.6   LYMPH 21.0  1.8 19.3  2.0 18.5  2.0   MONO 7.9  0.7 8.6  0.9 8.5  0.9   BASO 0.08 0.09 0.08   NRBC 0 0 0       COAGULATION LAST 3 DAYS  No results for input(s): "LABPT", "INR", "APTT" in the last 168 hours.      CHEMISTRY LAST 3 DAYS  Recent Labs   Lab 01/02/25  0611 01/03/25  0505 01/04/25  0435   * 136 135*   K 4.1 3.9 3.9   CL 99 101 100   CO2 30* 29 30*   ANIONGAP 4* 6* 5*   BUN 14 13 11   CREATININE 0.5 0.5 0.5   GLU 95 94 85   CALCIUM 9.4 9.1 9.2   MG 2.0 1.8 1.9       CARDIAC PROFILE LAST 3 DAYS  No results for input(s): "BNP", "CPK", "CPKMB", "LDH", "TROPONINI", "TROPONINIHS" in the last 168 hours.      ENDOCRINE LAST 3 DAYS  No results for input(s): "TSH", "PROCAL" in the last 168 hours.      LAST ARTERIAL " "BLOOD GAS  ABG  No results for input(s): "PH", "PO2", "PCO2", "HCO3", "BE" in the last 168 hours.    LAST 7 DAYS MICROBIOLOGY   Microbiology Results (last 7 days)       ** No results found for the last 168 hours. **            MOST RECENT IMAGING  Echo    Left Ventricle: The left ventricle is normal in size. Normal wall   thickness. Normal wall motion. There is normal systolic function with a   visually estimated ejection fraction of 55 - 60%. There is normal   diastolic function.    Right Ventricle: Normal right ventricular cavity size. Wall thickness   is normal. Systolic function is normal.    Left Atrium: Left atrium is moderately dilated.    Right Atrium: Right atrium is moderately dilated.    Mitral Valve: There is mild regurgitation with a centrally directed   jet.    IVC/SVC: Normal venous pressure at 3 mmHg.      ECHOCARDIOGRAM RESULTS (last 5)  No results found for this or any previous visit.      CURRENT/PREVIOUS VISIT EKG  Results for orders placed or performed during the hospital encounter of 12/27/24   EKG 12-lead    Collection Time: 01/01/25  4:40 AM   Result Value Ref Range    QRS Duration 96 ms    OHS QTC Calculation 491 ms    Narrative    Test Reason : R07.9,    Vent. Rate : 130 BPM     Atrial Rate :    BPM     P-R Int :    ms          QRS Dur :  96 ms      QT Int : 334 ms       P-R-T Axes :    111   2 degrees    QTcB Int : 491 ms    Atrial fibrillation with rapid ventricular response with premature  ventricular or aberrantly conducted complexes  Right axis deviation  Low voltage QRS  Incomplete right bundle branch block  Nonspecific ST and T wave abnormality  Abnormal ECG  No previous ECGs available    Referred By: AAAREFERRAL SELF           Confirmed By:            ASSESSMENT/PLAN:     Active Hospital Problems    Diagnosis    *Atrial fibrillation with RVR    Pericardial effusion    Hyponatremia    Congestive heart failure    Asthma    Hypertension, essential, benign    Hypothyroidism    Obesity " (BMI 30-39.9)       ASSESSMENT & PLAN:     Afib with RVR s/p successful DCCV 12/29.  Went back into AFib with RVR again  HTN  Hx of CVA  Smoker   Hypothyroidism  Pericardial effusion-improved      RECOMMENDATIONS:    Failed cardio version focusing on rate control. She was cardioverted twice in the last 2 months and reverted back into AFib  Amnio drip DC yesterday.  Change Cardizem 60 q.6 to long-acting Cardizem 120 mg BID.  Continue metoprolol 50 mg BID.  Continue p.o. digoxin 0.125 mg daily.   Will attempt to avoid amiodarone with thyroid dysfunction. TSH 10.  Continue anticoagulation with Eliquis 2.5 mg b.i.d.  Benefits from outpatient EP evaluation for ablation - we will place referral and they will reach out to her as an outpatient.  Dc home Jardiance and home amio.   Patient can be cleared from a cardiac standpoint to discharge home at this time and can follow up in clinic in 1-2 weeks.      Marcella Woodward NP  Cardiology  Date of Service: 1/3/25

## 2025-01-04 NOTE — PLAN OF CARE
Problem: Adult Inpatient Plan of Care  Goal: Plan of Care Review  Outcome: Progressing  Goal: Optimal Comfort and Wellbeing  Outcome: Progressing     Problem: Skin Injury Risk Increased  Goal: Skin Health and Integrity  Outcome: Progressing     Problem: Wound  Goal: Improved Oral Intake  Outcome: Progressing     Problem: Fall Injury Risk  Goal: Absence of Fall and Fall-Related Injury  Outcome: Progressing

## 2025-01-04 NOTE — PLAN OF CARE
01/04/25 0445   Patient Assessment/Suction   Level of Consciousness (AVPU) responds to voice   Respiratory Effort Normal;Unlabored   All Lung Fields Breath Sounds diminished   Rhythm/Pattern, Respiratory no shortness of breath reported;depth regular;pattern regular   PRE-TX-O2   Device (Oxygen Therapy) nasal cannula   $ Is the patient on Low Flow Oxygen? Yes   Flow (L/min) (Oxygen Therapy) 2   SpO2 (!) 94 %   Pulse Oximetry Type Intermittent   $ Pulse Oximetry - Multiple Charge Pulse Oximetry - Multiple   Pulse 89   Resp 20   Aerosol Therapy   $ Aerosol Therapy Charges PRN treatment not required   Respiratory Treatment Status (SVN) PRN treatment not required

## 2025-01-05 VITALS
SYSTOLIC BLOOD PRESSURE: 94 MMHG | OXYGEN SATURATION: 92 % | TEMPERATURE: 97 F | HEIGHT: 64 IN | RESPIRATION RATE: 20 BRPM | WEIGHT: 150.81 LBS | DIASTOLIC BLOOD PRESSURE: 64 MMHG | BODY MASS INDEX: 25.75 KG/M2 | HEART RATE: 72 BPM

## 2025-01-05 LAB
ANION GAP SERPL CALC-SCNC: 4 MMOL/L (ref 8–16)
BASOPHILS # BLD AUTO: 0.09 K/UL (ref 0–0.2)
BASOPHILS NFR BLD: 0.9 % (ref 0–1.9)
BUN SERPL-MCNC: 13 MG/DL (ref 8–23)
CALCIUM SERPL-MCNC: 8.7 MG/DL (ref 8.7–10.5)
CHLORIDE SERPL-SCNC: 101 MMOL/L (ref 95–110)
CO2 SERPL-SCNC: 29 MMOL/L (ref 23–29)
CREAT SERPL-MCNC: 0.6 MG/DL (ref 0.5–1.4)
DIFFERENTIAL METHOD BLD: ABNORMAL
EOSINOPHIL # BLD AUTO: 0.3 K/UL (ref 0–0.5)
EOSINOPHIL NFR BLD: 2.4 % (ref 0–8)
ERYTHROCYTE [DISTWIDTH] IN BLOOD BY AUTOMATED COUNT: 15.1 % (ref 11.5–14.5)
EST. GFR  (NO RACE VARIABLE): >60 ML/MIN/1.73 M^2
GLUCOSE SERPL-MCNC: 87 MG/DL (ref 70–110)
HCT VFR BLD AUTO: 38.6 % (ref 37–48.5)
HGB BLD-MCNC: 12.8 G/DL (ref 12–16)
IMM GRANULOCYTES # BLD AUTO: 0.08 K/UL (ref 0–0.04)
IMM GRANULOCYTES NFR BLD AUTO: 0.8 % (ref 0–0.5)
LYMPHOCYTES # BLD AUTO: 2.3 K/UL (ref 1–4.8)
LYMPHOCYTES NFR BLD: 22.4 % (ref 18–48)
MAGNESIUM SERPL-MCNC: 1.9 MG/DL (ref 1.6–2.6)
MCH RBC QN AUTO: 28.5 PG (ref 27–31)
MCHC RBC AUTO-ENTMCNC: 33.2 G/DL (ref 32–36)
MCV RBC AUTO: 86 FL (ref 82–98)
MONOCYTES # BLD AUTO: 0.9 K/UL (ref 0.3–1)
MONOCYTES NFR BLD: 8.5 % (ref 4–15)
NEUTROPHILS # BLD AUTO: 6.7 K/UL (ref 1.8–7.7)
NEUTROPHILS NFR BLD: 65 % (ref 38–73)
NRBC BLD-RTO: 0 /100 WBC
OHS QRS DURATION: 102 MS
OHS QRS DURATION: 104 MS
OHS QRS DURATION: 96 MS
OHS QRS DURATION: 96 MS
OHS QTC CALCULATION: 393 MS
OHS QTC CALCULATION: 464 MS
OHS QTC CALCULATION: 481 MS
OHS QTC CALCULATION: 491 MS
PHOSPHATE SERPL-MCNC: 3 MG/DL (ref 2.7–4.5)
PLATELET # BLD AUTO: 436 K/UL (ref 150–450)
PMV BLD AUTO: 9.2 FL (ref 9.2–12.9)
POTASSIUM SERPL-SCNC: 4.2 MMOL/L (ref 3.5–5.1)
RBC # BLD AUTO: 4.49 M/UL (ref 4–5.4)
SODIUM SERPL-SCNC: 134 MMOL/L (ref 136–145)
WBC # BLD AUTO: 10.26 K/UL (ref 3.9–12.7)

## 2025-01-05 PROCEDURE — 25000003 PHARM REV CODE 250

## 2025-01-05 PROCEDURE — 84100 ASSAY OF PHOSPHORUS: CPT

## 2025-01-05 PROCEDURE — 83735 ASSAY OF MAGNESIUM: CPT

## 2025-01-05 PROCEDURE — 94761 N-INVAS EAR/PLS OXIMETRY MLT: CPT

## 2025-01-05 PROCEDURE — 85025 COMPLETE CBC W/AUTO DIFF WBC: CPT

## 2025-01-05 PROCEDURE — 27000221 HC OXYGEN, UP TO 24 HOURS

## 2025-01-05 PROCEDURE — 36415 COLL VENOUS BLD VENIPUNCTURE: CPT

## 2025-01-05 PROCEDURE — 25000003 PHARM REV CODE 250: Performed by: FAMILY MEDICINE

## 2025-01-05 PROCEDURE — 80048 BASIC METABOLIC PNL TOTAL CA: CPT

## 2025-01-05 PROCEDURE — 99900035 HC TECH TIME PER 15 MIN (STAT)

## 2025-01-05 RX ORDER — DILTIAZEM HYDROCHLORIDE 120 MG/1
120 CAPSULE, COATED, EXTENDED RELEASE ORAL 2 TIMES DAILY
Qty: 60 CAPSULE | Refills: 2 | Status: SHIPPED | OUTPATIENT
Start: 2025-01-05 | End: 2025-04-05

## 2025-01-05 RX ORDER — TALC
6 POWDER (GRAM) TOPICAL NIGHTLY PRN
Start: 2025-01-05

## 2025-01-05 RX ORDER — TIOTROPIUM BROMIDE AND OLODATEROL 3.124; 2.736 UG/1; UG/1
2 SPRAY, METERED RESPIRATORY (INHALATION) DAILY
Qty: 4 G | Refills: 2 | Status: SHIPPED | OUTPATIENT
Start: 2025-01-05

## 2025-01-05 RX ORDER — DIGOXIN 125 MCG
0.12 TABLET ORAL DAILY
Qty: 30 TABLET | Refills: 2 | Status: SHIPPED | OUTPATIENT
Start: 2025-01-05 | End: 2025-04-05

## 2025-01-05 RX ADMIN — APIXABAN 2.5 MG: 2.5 TABLET, FILM COATED ORAL at 08:01

## 2025-01-05 RX ADMIN — DILTIAZEM HYDROCHLORIDE 120 MG: 120 CAPSULE, COATED, EXTENDED RELEASE ORAL at 08:01

## 2025-01-05 RX ADMIN — LEVOTHYROXINE SODIUM 112 MCG: 112 TABLET ORAL at 05:01

## 2025-01-05 RX ADMIN — FUROSEMIDE 20 MG: 20 TABLET ORAL at 08:01

## 2025-01-05 RX ADMIN — DIGOXIN 0.12 MG: 125 TABLET ORAL at 08:01

## 2025-01-05 RX ADMIN — METOPROLOL TARTRATE 50 MG: 50 TABLET, FILM COATED ORAL at 08:01

## 2025-01-05 RX ADMIN — PREGABALIN 100 MG: 75 CAPSULE ORAL at 08:01

## 2025-01-05 NOTE — CARE UPDATE
01/01/25 0741   Patient Assessment/Suction   Level of Consciousness (AVPU) alert   Respiratory Effort Unlabored   Expansion/Accessory Muscles/Retractions expansion symmetric;no retractions;no use of accessory muscles   Rhythm/Pattern, Respiratory depth regular;pattern regular;unlabored   PRE-TX-O2   Device (Oxygen Therapy) nasal cannula   $ Is the patient on Low Flow Oxygen? Yes   Flow (L/min) (Oxygen Therapy) 2   SpO2 (!) 93 %   Pulse Oximetry Type Intermittent   $ Pulse Oximetry - Multiple Charge Pulse Oximetry - Multiple   Pulse (!) 133  (nurse notfied MD)   Aerosol Therapy   $ Aerosol Therapy Charges PRN treatment not required   Education   $ Education 15 min;Oxygen       
   01/01/25 2008   Patient Assessment/Suction   Level of Consciousness (AVPU) alert   Respiratory Effort Normal;Unlabored   PRE-TX-O2   Device (Oxygen Therapy) nasal cannula   $ Is the patient on Low Flow Oxygen? Yes   Flow (L/min) (Oxygen Therapy) 2   SpO2 95 %   Pulse Oximetry Type Intermittent   $ Pulse Oximetry - Multiple Charge Pulse Oximetry - Multiple   Pulse (!) 141   Resp 16   Temp 98 °F (36.7 °C)   /64   Aerosol Therapy   $ Aerosol Therapy Charges PRN treatment not required   Education   $ Education 15 min;Bronchodilator;Oxygen       
   01/02/25 0703   Patient Assessment/Suction   Level of Consciousness (AVPU) alert   Respiratory Effort Normal;Unlabored   Expansion/Accessory Muscles/Retractions no use of accessory muscles;no retractions;expansion symmetric   All Lung Fields Breath Sounds clear   Rhythm/Pattern, Respiratory unlabored;pattern regular;depth regular;chest wiggle adequate;no shortness of breath reported   Cough Frequency no cough   PRE-TX-O2   Device (Oxygen Therapy) nasal cannula   $ Is the patient on Low Flow Oxygen? Yes   Flow (L/min) (Oxygen Therapy) 2   SpO2 96 %   Pulse Oximetry Type Intermittent   $ Pulse Oximetry - Multiple Charge Pulse Oximetry - Multiple   Pulse 98   Resp 17   Aerosol Therapy   $ Aerosol Therapy Charges PRN treatment not required   Education   $ Education Bronchodilator;15 min       
   01/04/25 0813   Patient Assessment/Suction   Level of Consciousness (AVPU) alert   Respiratory Effort Normal;Unlabored   Expansion/Accessory Muscles/Retractions no use of accessory muscles   All Lung Fields Breath Sounds clear;diminished   Rhythm/Pattern, Respiratory no shortness of breath reported   Cough Frequency no cough   Cough Type nonproductive   PRE-TX-O2   Device (Oxygen Therapy) nasal cannula   $ Is the patient on Low Flow Oxygen? Yes   Flow (L/min) (Oxygen Therapy) 1   Oxygen Concentration (%) 24   SpO2 (!) 94 %   Pulse Oximetry Type Intermittent   $ Pulse Oximetry - Multiple Charge Pulse Oximetry - Multiple   Pulse 78   Resp 16   Aerosol Therapy   $ Aerosol Therapy Charges PRN treatment not required   Respiratory Treatment Status (SVN) PRN treatment not required   Education   $ Education Oxygen;15 min       
   01/05/25 0734   Patient Assessment/Suction   Level of Consciousness (AVPU) alert   Respiratory Effort Normal;Unlabored   Expansion/Accessory Muscles/Retractions no use of accessory muscles;no retractions;expansion symmetric   All Lung Fields Breath Sounds diminished   PRE-TX-O2   Device (Oxygen Therapy) nasal cannula  (weaned to RA)   $ Is the patient on Low Flow Oxygen? Yes   Flow (L/min) (Oxygen Therapy) 2   SpO2 95 %   Pulse Oximetry Type Intermittent   $ Pulse Oximetry - Multiple Charge Pulse Oximetry - Multiple   Aerosol Therapy   $ Aerosol Therapy Charges PRN treatment not required       
   12/27/24 2007   Patient Assessment/Suction   Level of Consciousness (AVPU) alert   Respiratory Effort Unlabored;Short of breath   Expansion/Accessory Muscles/Retractions no use of accessory muscles   All Lung Fields Breath Sounds Anterior:;wheezes, inspiratory   Rhythm/Pattern, Respiratory depth regular;pattern regular   Cough Frequency infrequent   Cough Type productive   PRE-TX-O2   Device (Oxygen Therapy) nasal cannula   $ Is the patient on Low Flow Oxygen? Yes   Flow (L/min) (Oxygen Therapy) 2   SpO2 97 %   Pulse Oximetry Type Continuous   $ Pulse Oximetry - Multiple Charge Pulse Oximetry - Multiple   Pulse (!) 113   Resp 20   Aerosol Therapy   $ Aerosol Therapy Charges Aerosol Treatment   Daily Review of Necessity (SVN) completed   Respiratory Treatment Status (SVN) given   Treatment Route (SVN) mask   Patient Position HOB elevated   Post Treatment Assessment (SVN) increased aeration   Signs of Intolerance (SVN) none   Education   $ Education Bronchodilator;Oxygen;15 min   Tobacco Cessation Intervention   Do you use any type of tobacco product? No   Are you interested in quitting use of tobacco products? Not interested   Are you interested in Nicotine Replacement for symptom relief? No   Respiratory Evaluation   $ Care Plan Tech Time 15 min   $ Respiratory Evaluation Complete   Evaluation For New Orders   Admitting Diagnosis Palpitations   Cardiac Diagnosis N/A   Pulmonary Diagnosis N/A   Home Oxygen   Has Home Oxygen? No   Home Aerosol, MDI, DPI, and Other Treatments/Therapies   Home Respiratory Therapy Per Patient/Review of Chart No   Oxygen Care Plan   Oxygen Care Plan Per Protocol   SPO2 Goal (%) 92% non-cardiac   Rationale SpO2 is <92% (Non-cardiac Pt.)   Bronchodilator Care Plan   Bronchodilator Care Plan N/A   Rationale No Rationale found   Atelectasis Care Plan   Rationale No Rational Found   Airway Clearance Care Plan   Rationale No rationale found       
   12/30/24 2100   Patient Assessment/Suction   Level of Consciousness (AVPU) alert   Respiratory Effort Unlabored   Expansion/Accessory Muscles/Retractions no use of accessory muscles   All Lung Fields Breath Sounds clear;equal bilaterally;diminished   Rhythm/Pattern, Respiratory no shortness of breath reported   Cough Frequency no cough   PRE-TX-O2   Device (Oxygen Therapy) nasal cannula   $ Is the patient on Low Flow Oxygen? Yes   Flow (L/min) (Oxygen Therapy) 2   Pulse Oximetry Type Continuous   $ Pulse Oximetry - Multiple Charge Pulse Oximetry - Multiple   Pulse 78   Resp (!) 35   Positioning   Head of Bed (HOB) Positioning HOB elevated;HOB at 30 degrees   Aerosol Therapy   $ Aerosol Therapy Charges PRN treatment not required       
   12/31/24 1703   PRE-TX-O2   Device (Oxygen Therapy) room air   SpO2 96 %   Pulse 91   Resp 18   Home Oxygen Qualification   $ Home O2 Qualification Pulmonary Stress Test/6 min walk   Room Air SpO2 At Rest 96 %   Room Air SpO2 During Ambulation 95 %   SpO2 Post Ambulation 96 %   Post Ambulation Heart Rate 89 bpm   Home O2 Eval Comments   (pt does not qualify for home 02)       
no chest pain, no cough, and no shortness of breath.

## 2025-01-05 NOTE — PLAN OF CARE
CLEARED FOR DISCHARGE FROM CASE MANAGEMENT STANDPOINT.  Patient states her niece Gabriela will pick her up.  Follow up appointments are on AVS.  Medications sent to [facility] on file.  No DME needs  [facility]  start of care 01/06/25.  Patient verbalized understanding of all the above.   01/05/25 1120   Final Note   Assessment Type Final Discharge Note   Anticipated Discharge Disposition Home-Health   What phone number can be called within the next 1-3 days to see how you are doing after discharge? [phone]   Post-Acute Status   Post-Acute Authorization Home Health   Home Health Status Set-up Complete/Auth obtained   Discharge Delays None known at this time

## 2025-01-05 NOTE — NURSING
Discharge instructions reviewed with pt. Pt voices understanding of discharge instructions. All questions answered. IV removed and tele monitor off. Tele monitor returned to monitor room. All belongings with pt.  Pt brought down via wheelchair to discharge. Pt discharged via family transport.

## 2025-01-05 NOTE — DISCHARGE SUMMARY
Counts include 234 beds at the [facility] Medicine  Discharge Summary      Patient Name: Niurka Patel  MRN: [id number]  [facility]: [id number]  Patient Class: IP- Inpatient  Admission Date: 12/27/2024  Hospital Length of Stay: 8 days  Discharge Date and Time:  01/05/2025 9:17 AM  Attending Physician: Chon Shepard DO   Discharging Provider: Chon Shepard DO  Primary Care Provider: Елена Barron NP    Primary Care Team: Networked reference to record PCT     HPI:   Ms. Patel is an 84 yr old female with a hx of with RVR, asthma, CHF, HTN, hypothyroidism, obesity, CVA, HLD, PE, tremors who presented to the ED with a chief complaint of palpitations.  Upon talking to patient she actually denies ever feeling palpitations or any, fluttering in her chest.  She states that over the last 4 weeks she has had increased generalized weakness and fatigue.  She also endorses productive cough with white sputum, shortness of breath, and leg swelling.  She states that her home health nurse came to check on her today and noticed that she seemed very wobbly while using her cane and advised her to come to the emergency room for further evaluation.  Patient states that she was in AFib about 1 month ago and had cardioversion on 11/26/2024 with successful conversion to normal sinus rhythm.  Per chart review, she was discharged on amiodarone 400 mg b.i.d. for 10 days then 200 mg daily afterwards.  She was also told to continue taking her metoprolol and Eliquis.  She also endorses a history of CHF denies PND and orthopnea.  Denies any recent sick contacts or home oxygen use.  Endorses tobacco use and smokes 1 pack of cigarettes a day and denies alcohol and recreational drug use.  She denies fever, chills, palpitations, diaphoresis, chest pain, abdominal pain, nausea, vomiting, diarrhea, dysuria, hematuria, lightheadedness, dizziness, and syncope.    Upon arrival to ED, patient afebrile, HR of 122, RR of 18, BP of 103/74, satting 97% on RA.   Workup in the ED revealed sodium of 124, chloride of 93, BNP of 156.  CXR with cardiomegaly.  Scattered linear and ground-glass opacities in the mid lower lung zones.  Blunting of the costophrenic angles compatible mall components of pleural fluid.  Questionable nodular opacity in the right upper lobe near the apex.  Follow-up plain radiographs are recommended.  Patient was given aspirin 325 mg oral and initiated on diltiazem titrating gtt while in the ED. case discussed with ED provider and patient will be placed under observation for further management.    Procedure(s) (LRB):  Cardioversion or Defibrillation (N/A)      Hospital Course:   Patient was admitted for atrial fibrillation in R and had hyponatremia.  Patient was started on diltiazem drip.  Cardiology was consulted.  Cardioversion planned for 12/29.  Electrolytes repleted.  For hyponatremia, HCTZ was stopped. Lasix was given with caution for CHF.  Nephrology closely followed the patient.  At presentation, TSH was elevated but free T4 was normal, held off increasing Synthroid given active AFib with heart rates 130-140.  2D echocardiogram showed moderate pericardial effusion, but clinically she was not in tamponade.  Successful cardioversion to sinus rhythm with 2 shocks of 200 joules.  Diltiazem drip stopped and patient was started on low-dose beta blocker given EF reduced to 40-45% on 2D echocardiogram.  Sodium level improved, she responded well to IV Lasix. HR stable with beta blocker. Repeat ECHO ordered to monitor for pericardial effusion, which showed significant improvement and now only has trivial effusion.    Patient went back into RVR tried metoprolol and digoxin and ultimately needed amiodarone but patient able to be weaned off drip now.  Amiodarone and Cardizem stopped by Cardiology, increasing digoxin and metoprolol on an attempt to keep her rate controlled with p.o. medications.  Long discussion with patient today and since she has been  asymptomatic this whole time and we are not able to control her heart rate except for on amiodarone GTT, may need ablation but a bad candidate.  Patient is agreeable to go home with palliative care and home hospice consult and Cardiology agrees as patient feels like we are and I have able to do much for her which is not wrong.  Plan to DC with home palliative care and hospice consult tomorrow.     Goals of Care Treatment Preferences:  Code Status: DNR      SDOH Screening:  The patient was screened for utility difficulties, food insecurity, transport difficulties, housing insecurity, and interpersonal safety and there were no concerns identified this admission.     Consults:   Consults (From admission, onward)          Status Ordering Provider     Inpatient consult to Anesthesiology  Once        Provider:  Prieto Evans MD    Acknowledged MISAEL HA     Inpatient consult to Nephrology  Once        Provider:  Trey Santiago MD    Completed LOWELL MESA     Inpatient consult to Registered Dietitian/Nutritionist  Once        Provider:  (Not yet assigned)    Completed LOWELL MESA     Case Management/  Once        Provider:  (Not yet assigned)    Completed LOWELL MESA     Inpatient consult to Cardiology  Once        Provider:  Jacqueline Santos MD    Completed LOWELL MESA            * Atrial fibrillation with RVR  Patient has paroxysmal (<7 days) atrial fibrillation. Patient is currently in atrial fibrillation. [id number] Score: 4. The patients heart rate in the last 24 hours is as follows:  Pulse  Min: 97  Max: 141     Antiarrhythmics  metoprolol tartrate (LOPRESSOR) split tablet 12.5 mg, 2 times daily, Oral  diltiaZEM tablet 60 mg, Every 6 hours, Oral    Anticoagulants  apixaban tablet 5 mg, 2 times daily, Oral    Plan  - Replete lytes with a goal of K>4, Mg >2  - Patient is anticoagulated, see medications listed above.  - Patient's afib is currently  uncontrolled. Will continue current treatment  - Underwent cardioversion on 11/26/2024 with conversion to NSR, was sent home on amio 400 mg BID for 10 days then 200 mg daily afterwards, says she has been compliant. Was also told to resume Toprol and eliquis.  - Initiated on diltiazem gtt in the ED  - Cardiology consulted  - cardioversion 12/30, diltiazem drip stopped, low-dose beta blocker started given reduced EF  - able to wean off diltiazem and amiodarone, now on digoxin and metoprolol, further recs per cardiology    Pericardial effusion  - moderate pericardial effusion on 2D echocardiogram 12/28   - Clinically not in tamponade physiology  - asymptomatic, vitals stable  - cardiology following  - caution with IV diuresis    Repeat echo 12/30 - now only has trivial effusion, significant improvement  Stable to transfer out of ICU      Hyponatremia  The patient's most recent sodium results are listed below.  Recent Labs     12/31/24  1342 01/01/25  0321 01/02/25  0611   * 132* 133*       Plan  - Correct the sodium by 4-6mEq in 24 hours.   - Urine sodium, urine osmolality, serum osmolality, AM cortisol, or TSH, T4.  - TSH elevated, Free T4 normal - needs repeat labs with synthyroid  - hold IV fluids and reduced ejection fraction  - Monitor sodium Daily.   - Patient hyponatremia is improving  - Nephrology closely following  - discontinue HCTZ at discharge  - IV lasix for diuresis with caution as tolerated  - monitor repeat BMP            Obesity (BMI 30-39.9)  Body mass index is 26.68 kg/m². Morbid obesity complicates all aspects of disease management from diagnostic modalities to treatment.        Hypothyroidism  - Continue home synthroid  - TSH elevated, Free T4 normal  - She will need repeat TFTs and Synthroid dose adjusting once heart rates improve.  - Repeat TSH to make sure this is not euthyroid sick syndrome, EPIC gives a hard stop        Hypertension, essential, benign  Latest blood pressure and vitals  "reviewed-   Temp:  [97.5 °F (36.4 °C)-98.1 °F (36.7 °C)]   Pulse:  [61-82]   Resp:  [14-35]   BP: (113-137)/(60-86)   SpO2:  [92 %-100 %] .     Home meds for hypertension were reviewed and noted below.   Hypertension Medications               furosemide (LASIX) 20 MG tablet Take 1 tablet every day by oral route as needed.    hydroCHLOROthiazide (MICROZIDE) 12.5 mg capsule Take 12.5 mg by mouth.    metoprolol tartrate (LOPRESSOR) 50 MG tablet Take 1 tablet by mouth 2 (two) times daily.          - Holding home HCTZ in the setting of hyponatremia  - Continue home metoprolol with parameters  - Lasix with caution based on BMP    Congestive heart failure  Patient has  hx of  heart failure that is Acute on chronic. On presentation their CHF was decompensated. Evidence of decompensated CHF on presentation includes: edema, dyspnea on exertion (FROST), and shortness of breath. Most recent BNP and echo results are listed below.      No results for input(s): "BNP" in the last 72 hours.    Latest ECHO  No results found for this or any previous visit.    Current Heart Failure Medications  furosemide injection 20 mg, Daily, Intravenous    Plan  - Monitor strict I&Os and daily weights.    - Place on telemetry  - Low sodium diet  - Place on fluid restriction of 1.5 L.   - Cardiology has been consulted  - The patient's volume status is stable but not at their baseline as indicated by edema, crackles on lung auscultation, dyspnea on exertion (FROST), and shortness of breath  - on review of pt's last echo, revealed normal EF and undetermined diastolic function.  - Cardiology consulted, appreciate recs  - was on IV Lasix, was responding well  - hold hydrochlorothiazide with hyponatremia.    Lasix held given concern of new CED, restart depending on repeat labs        Asthma  - DuoNebs Q6H PRN  - prednisone 20 mg p.o. daily for minimal wheezing      Final Active Diagnoses:    Diagnosis Date Noted POA    PRINCIPAL PROBLEM:  Atrial fibrillation " with RVR [I48.91] 07/12/2022 Yes    Pericardial effusion [I31.39] 12/30/2024 Yes    Hyponatremia [E87.1] 12/27/2024 Yes    Congestive heart failure [I50.9] 11/01/2024 Yes    Asthma [J45.909] 08/12/2024 Yes    Hypertension, essential, benign [I10] 07/16/2024 Yes    Hypothyroidism [E03.9] 04/05/2024 Yes    Obesity (BMI 30-39.9) [E66.9] 12/19/2017 Yes      Problems Resolved During this Admission:       Discharged Condition: fair    Disposition: Home-Health Care Sv    Follow Up:   Contact information for follow-up providers       Елена Barron NP. Go on 1/15/2025.    Specialty: Family Medicine  Why: hospital follow up @11am  Contact information:  [address] [facility] [address]  [phone]               Boris Pace MD. Go on 1/14/2025.    Specialties: Cardiovascular Disease, Interventional Cardiology  Why: @1115  Contact information:  [address] [place] [id number] [phone]                       Contact information for after-discharge care       Home Medical Care       [facility] .    Service: Home Health Services  Why:   Contact information:  [address] [address]  [address]  [facility] [address]  [phone]                                 Patient Instructions:      Ambulatory referral/consult to Home Health   Standing Status: Future   Referral Priority: Routine Referral Type: Home Health   Referral Reason: Specialty Services Required   Requested Specialty: Home Health Services   Number of Visits Requested: 1     Ambulatory referral/consult to HOME Palliative Care   Standing Status: Future   Referral Priority: Routine Referral Type: Consultation   Requested Specialty: Palliative Medicine   Number of Visits Requested: 1     Notify your health care provider if you experience any of the following:  temperature >100.4     Notify your health care provider if you experience any of the following:  persistent nausea and  "vomiting or diarrhea     Notify your health care provider if you experience any of the following:  severe uncontrolled pain     Notify your health care provider if you experience any of the following:  difficulty breathing or increased cough     Notify your health care provider if you experience any of the following:  persistent dizziness, light-headedness, or visual disturbances     SUBSEQUENT HOME HEALTH ORDERS     Order Specific Question Answer Comments   What Home Health Agency is the patient currently using? [facility]      Activity as tolerated       Significant Diagnostic Studies: Labs: BMP:   Recent Labs   Lab 01/04/25 0435 01/05/25 0457   GLU 85 87   * 134*   K 3.9 4.2    101   CO2 30* 29   BUN 11 13   CREATININE 0.5 0.6   CALCIUM 9.2 8.7   MG 1.9 1.9   , CMP   Recent Labs   Lab 01/04/25 0435 01/05/25 0457   * 134*   K 3.9 4.2    101   CO2 30* 29   GLU 85 87   BUN 11 13   CREATININE 0.5 0.6   CALCIUM 9.2 8.7   ANIONGAP 5* 4*   , CBC   Recent Labs   Lab 01/04/25 0435 01/05/25 0457   WBC 11.05 10.26   HGB 13.2 12.8   HCT 40.7 38.6   * 436   , INR   Lab Results   Component Value Date    INR 1.1 12/28/2024    INR 1.1 12/27/2024   , Lipid Panel   Lab Results   Component Value Date    CHOL 100 (L) 12/28/2024    HDL 51 12/28/2024    LDLCALC 38.0 (L) 12/28/2024    TRIG 55 12/28/2024    CHOLHDL 51.0 (H) 12/28/2024   , Troponin No results for input(s): "TROPONINI" in the last 168 hours., A1C:   Recent Labs   Lab 12/28/24  0345   HGBA1C 6.1   , and All labs within the past 24 hours have been reviewed  Radiology:   Imaging Results              X-Ray Chest AP Portable (Final result)  Result time 12/27/24 16:22:14      Final result by Yaquelin Jacobs IV, MD (12/27/24 16:22:14)                   Impression:      Suboptimal positioning.    Cardiomegaly.    Scattered linear and ground-glass opacities in the mid lower lung zones.    Blunting of the costophrenic angles compatible " small components of pleural fluid.    Questionable nodular opacity in the right upper lobe near the apex.  Follow-up plain radiographs are recommended to determine at this represents a persistent finding.  If such, CT would be necessary for further investigation.      Electronically signed by: Yaquelin Jacobs  Date:    12/27/2024  Time:    16:22               Narrative:    EXAMINATION:  XR CHEST AP PORTABLE    CLINICAL HISTORY:  CHF;    COMPARISON:  None.    FINDINGS:  The patient is significantly rotated.    The heart is enlarged.  The central pulmonary vasculature does not appear acutely engorged.  There is tortuosity of the aorta and branches.    Linear parenchymal opacities are observed within the left mid and lower lung zone and in the right lung base.  Additionally, there are mild ground-glass opacities in the right mid lung zone and bilateral lower lung zones.  Blunting of the costophrenic angles bilaterally suggest small components of pleural fluid.    There is a nodular appearing opacity within the left pulmonary apex.  Follow-up radiographs are recommended to determine if this is a persistent abnormality.                                      Cardiac Graphics: Echocardiogram: 2D echo with color flow doppler: No results found for this or any previous visit. and Transthoracic echo (TTE) complete (Cupid Only):   Results for orders placed or performed during the hospital encounter of 12/27/24   Echo   Result Value Ref Range    TAPSE 1.56 cm    IVC diameter 2.10 cm    BSA 1.82 m2    Est. RA pres 3 mmHg    Narrative      Left Ventricle: The left ventricle is normal in size. Normal wall   thickness. Normal wall motion. There is normal systolic function with a   visually estimated ejection fraction of 55 - 60%. There is normal   diastolic function.    Right Ventricle: Normal right ventricular cavity size. Wall thickness   is normal. Systolic function is normal.    Left Atrium: Left atrium is moderately dilated.     Right Atrium: Right atrium is moderately dilated.    Mitral Valve: There is mild regurgitation with a centrally directed   jet.    IVC/SVC: Normal venous pressure at 3 mmHg.         Pending Diagnostic Studies:       Procedure Component Value Units Date/Time    EKG 12-lead [[id number]] Collected: 01/01/25 0440    Order Status: Sent Lab Status: In process Updated: 01/01/25 0500     QRS Duration 96 ms      [facility] QTC Calculation 491 ms     Narrative:      Test Reason : R07.9,    Vent. Rate : 130 BPM     Atrial Rate :    BPM     P-R Int :    ms          QRS Dur :  96 ms      QT Int : 334 ms       P-R-T Axes :    111   2 degrees    QTcB Int : 491 ms    Atrial fibrillation with rapid ventricular response with premature  ventricular or aberrantly conducted complexes  Right axis deviation  Low voltage QRS  Incomplete right bundle branch block  Nonspecific ST and T wave abnormality  Abnormal ECG  No previous ECGs available    Referred By: AAAREFERRAL SELF           Confirmed By:            Medications:  Reconciled Home Medications:      Medication List        START taking these medications      digoxin 125 mcg tablet  Commonly known as: LANOXIN  Take 1 tablet (0.125 mg total) by mouth once daily.     diltiaZEM 120 MG Cp24  Commonly known as: CARDIZEM CD  Take 1 capsule (120 mg total) by mouth 2 (two) times a day.     melatonin 3 mg tablet  Commonly known as: MELATIN  Take 2 tablets (6 mg total) by mouth nightly as needed for Insomnia.     STIOLTO RESPIMAT 2.5-2.5 mcg/actuation Mist  Generic drug: tiotropium-olodateroL  Inhale 2 puffs into the lungs once daily. Controller            CHANGE how you take these medications      apixaban 2.5 mg Tab  Commonly known as: ELIQUIS  Take 1 tablet (2.5 mg total) by mouth 2 (two) times daily.  What changed:   medication strength  how much to take            CONTINUE taking these medications      azelastine 137 mcg (0.1 %) nasal spray  Commonly known as: ASTELIN  1 spray (137 mcg total) by  Nasal route 2 (two) times daily as needed for Rhinitis.     ergocalciferol 50,000 unit Cap  Commonly known as: ERGOCALCIFEROL  TAKE ONE CAPSULE BY MOUTH EVERY 30 DAYS     furosemide 20 MG tablet  Commonly known as: LASIX  Take 1 tablet every day by oral route as needed.     ibandronate 150 mg tablet  Commonly known as: BONIVA  TAKE ONE TABLET BY MOUTH EVERY 30 DAYS IN THE MORNING     metoprolol tartrate 50 MG tablet  Commonly known as: LOPRESSOR  Take 1 tablet by mouth 2 (two) times daily.     pravastatin 10 MG tablet  Commonly known as: PRAVACHOL  Take 1 tablet by mouth every evening.     pregabalin 100 MG capsule  Commonly known as: LYRICA  Take 1 capsule by mouth 2 (two) times daily.     SYNTHROID 112 mcg tablet  Generic drug: levothyroxine  Take 1 tablet by mouth once daily.            STOP taking these medications      amiodarone 200 MG Tab  Commonly known as: PACERONE     atorvastatin 10 MG tablet  Commonly known as: LIPITOR     benzocaine-menthoL 15-3.6 mg Lozg     benzocaine-menthoL 6-10 mg lozenge     cetirizine 10 MG tablet  Commonly known as: ZYRTEC     guaiFENesin 600 mg 12 hr tablet  Commonly known as: MUCINEX     hydroCHLOROthiazide 12.5 mg capsule  Commonly known as: MICROZIDE     hydrOXYzine HCL 25 MG tablet  Commonly known as: ATARAX     JARDIANCE 10 mg tablet  Generic drug: empagliflozin     levocetirizine 5 MG tablet  Commonly known as: XYZAL     primidone 50 MG Tab  Commonly known as: MYSOLINE     triamcinolone acetonide 0.1% 0.1 % cream  Commonly known as: KENALOG              Indwelling Lines/Drains at time of discharge:   Lines/Drains/Airways       None                   Time spent on the discharge of patient: 36 minutes         Chon Shepard DO  Department of Hospital Medicine  [facility]

## 2025-01-16 ENCOUNTER — TELEPHONE (OUTPATIENT)
Dept: ELECTROPHYSIOLOGY | Facility: CLINIC | Age: 85
End: 2025-01-16
Payer: MEDICARE

## 2025-01-16 NOTE — TELEPHONE ENCOUNTER
Spoke with patient, offered 3/26 @ 9:40am at [facility], pt agreed to date/time/location

## 2025-05-09 ENCOUNTER — OFFICE VISIT (OUTPATIENT)
Dept: URGENT CARE | Facility: CLINIC | Age: 85
End: 2025-05-09
Payer: MEDICARE

## 2025-05-09 VITALS
OXYGEN SATURATION: 96 % | SYSTOLIC BLOOD PRESSURE: 113 MMHG | WEIGHT: 144 LBS | DIASTOLIC BLOOD PRESSURE: 70 MMHG | RESPIRATION RATE: 20 BRPM | HEART RATE: 59 BPM | BODY MASS INDEX: 24.59 KG/M2 | HEIGHT: 64 IN

## 2025-05-09 DIAGNOSIS — N39.0 URINARY TRACT INFECTION WITHOUT HEMATURIA, SITE UNSPECIFIED: Primary | ICD-10-CM

## 2025-05-09 DIAGNOSIS — R05.9 COUGH, UNSPECIFIED TYPE: ICD-10-CM

## 2025-05-09 DIAGNOSIS — R81 GLUCOSE FOUND IN URINE ON EXAMINATION: ICD-10-CM

## 2025-05-09 DIAGNOSIS — R09.81 NASAL CONGESTION: ICD-10-CM

## 2025-05-09 DIAGNOSIS — R09.81 SINUS CONGESTION: ICD-10-CM

## 2025-05-09 LAB
BILIRUB SERPL-MCNC: NEGATIVE MG/DL
BLOOD, POC UA: NEGATIVE
CTP QC/QA: YES
CTP QC/QA: YES
FLUAV AG NPH QL: NEGATIVE
FLUBV AG NPH QL: NEGATIVE
GLUCOSE SERPL-MCNC: 98 MG/DL (ref 70–110)
GLUCOSE UR QL STRIP: POSITIVE
KETONES UR QL STRIP: NEGATIVE
LEUKOCYTE ESTERASE URINE, POC: POSITIVE
NITRITE, POC UA: NEGATIVE
PH, POC UA: 6
PROTEIN, POC: NEGATIVE
SARS CORONAVIRUS 2 ANTIGEN: NEGATIVE
SPECIFIC GRAVITY, POC UA: 1.01
UROBILINOGEN, POC UA: NEGATIVE

## 2025-05-09 RX ORDER — IPRATROPIUM BROMIDE 21 UG/1
2 SPRAY, METERED NASAL 2 TIMES DAILY
Qty: 30 ML | Refills: 0 | Status: SHIPPED | OUTPATIENT
Start: 2025-05-09 | End: 2025-05-09

## 2025-05-09 RX ORDER — ASPIRIN 81 MG/1
81 TABLET ORAL
COMMUNITY
Start: 2025-04-02 | End: 2026-04-02

## 2025-05-09 RX ORDER — NITROFURANTOIN 25; 75 MG/1; MG/1
100 CAPSULE ORAL 2 TIMES DAILY
Qty: 14 CAPSULE | Refills: 0 | Status: SHIPPED | OUTPATIENT
Start: 2025-05-09 | End: 2025-05-16

## 2025-05-09 RX ORDER — PROMETHAZINE HYDROCHLORIDE AND DEXTROMETHORPHAN HYDROBROMIDE 6.25; 15 MG/5ML; MG/5ML
5 SYRUP ORAL NIGHTLY PRN
Qty: 120 ML | Refills: 0 | Status: SHIPPED | OUTPATIENT
Start: 2025-05-09 | End: 2025-05-09

## 2025-05-09 RX ORDER — CIPROFLOXACIN 500 MG/1
500 TABLET ORAL 2 TIMES DAILY
COMMUNITY
Start: 2025-03-10 | End: 2025-05-09

## 2025-05-09 RX ORDER — METOPROLOL SUCCINATE 50 MG/1
50 TABLET, EXTENDED RELEASE ORAL 2 TIMES DAILY
COMMUNITY

## 2025-05-09 RX ORDER — NITROFURANTOIN 25; 75 MG/1; MG/1
100 CAPSULE ORAL 2 TIMES DAILY
Qty: 14 CAPSULE | Refills: 0 | Status: SHIPPED | OUTPATIENT
Start: 2025-05-09 | End: 2025-05-09

## 2025-05-09 RX ORDER — MIDODRINE HYDROCHLORIDE 5 MG/1
5 TABLET ORAL 3 TIMES DAILY
COMMUNITY
Start: 2025-02-12

## 2025-05-09 RX ORDER — PROMETHAZINE HYDROCHLORIDE AND DEXTROMETHORPHAN HYDROBROMIDE 6.25; 15 MG/5ML; MG/5ML
5 SYRUP ORAL NIGHTLY PRN
Qty: 120 ML | Refills: 0 | Status: SHIPPED | OUTPATIENT
Start: 2025-05-09

## 2025-05-09 RX ORDER — AMIODARONE HYDROCHLORIDE 200 MG/1
200 TABLET ORAL
COMMUNITY
Start: 2025-03-18 | End: 2026-03-18

## 2025-05-09 RX ORDER — ATORVASTATIN CALCIUM 10 MG/1
10 TABLET, FILM COATED ORAL
COMMUNITY
Start: 2025-01-15 | End: 2026-01-15

## 2025-05-09 RX ORDER — EMPAGLIFLOZIN 10 MG/1
10 TABLET, FILM COATED ORAL
COMMUNITY

## 2025-05-09 RX ORDER — PHENAZOPYRIDINE HYDROCHLORIDE 100 MG/1
100 TABLET, FILM COATED ORAL
COMMUNITY
Start: 2025-03-10

## 2025-05-09 RX ORDER — IPRATROPIUM BROMIDE 21 UG/1
2 SPRAY, METERED NASAL 2 TIMES DAILY
Qty: 30 ML | Refills: 0 | Status: SHIPPED | OUTPATIENT
Start: 2025-05-09

## 2025-05-09 RX ORDER — PRIMIDONE 50 MG/1
TABLET ORAL
COMMUNITY

## 2025-05-09 NOTE — PATIENT INSTRUCTIONS
Recommend increased intake of fluids.    If you were prescribed antibiotics take them as directed to completion.    You may take azo OTC as directed for painful urination unless you were prescribe something for these symptoms by the provider.  Follow-up with PCP or return to clinic if no improvement in symptoms over the next 3 days.      Please return here or go to the Emergency Department for any concerns or worsening of condition.  Please drink plenty of fluids.  Please get plenty of rest.  If you were prescribed antibiotics, please take them to completion.  If you were given wait & see antibiotics, please wait 5-7 days before taking them, and only take them if your symptoms have worsened or not improved.  If you do begin taking the antibiotics, please take them to completion.  If you were given a steroid shot in the clinic and have also been given a prescription for a steroid such as Prednisone or a Medrol Dose Pack, please begin taking them tomorrow.  If you do not have Hypertension or any history of palpitations, it is ok to take over the counter Sudafed or Mucinex D or Allegra-D or Claritin-D or Zyrtec-D.  If you do take one of the above, it is ok to combine that with plain over the counter Mucinex or Allegra or Claritin or Zyrtec.  If for example you are taking Zyrtec -D, you can combine that with Mucinex, but not Mucinex-D.  If you are taking Mucinex-D, you can combine that with plain Allegra or Claritin or Zyrtec.   If you do have Hypertension or palpitations, it is safe to take Coricidin HBP for relief of sinus symptoms.  If not allergic, please take over the counter Tylenol (Acetaminophen) and/or Motrin (Ibuprofen) as directed for control of pain and/or fever.  Please follow up with your primary care doctor or specialist as needed.    If you  smoke, please stop smoking.

## 2025-05-09 NOTE — PROGRESS NOTES
"Subjective:      Patient ID: Niurka Patel is a 84 y.o. female.    Vitals:  height is 5' 4" (1.626 m) and weight is 65.3 kg (144 lb). Her blood pressure is 113/70 and her pulse is 59 (abnormal). Her respiration is 20 and oxygen saturation is 96%.     Chief Complaint: Diarrhea and Sinus Problem    Pt states that yesterday she began to have some cough, nasal congestion, nasal drainage and diarrhea. Pt states that she has not had any fever, sob or chest pain. Pt denies any known exposure to illness. Pt states that she has not taken anything for her symptoms at this time. Pt states that she only had diarrhea this am around 0200 but has not had any since. Pt denies any abdominal pain, nausea or vomiting.     Diarrhea   This is a new problem. The current episode started yesterday. The problem has been gradually worsening. Associated symptoms include coughing. Pertinent negatives include no abdominal pain.   Sinus Problem  This is a new problem. Episode onset: x 1 week. The problem has been gradually worsening since onset. Associated symptoms include congestion and coughing.       Constitution: Negative.   HENT:  Positive for congestion and postnasal drip.    Neck: neck negative.   Cardiovascular: Negative.    Eyes: Negative.    Respiratory:  Positive for cough.    Gastrointestinal:  Positive for diarrhea. Negative for abdominal pain and nausea.   Endocrine: negative.   Genitourinary: Negative.    Musculoskeletal: Negative.    Skin: Negative.    Allergic/Immunologic: Negative.    Neurological: Negative.    Hematologic/Lymphatic: Negative.    Psychiatric/Behavioral: Negative.        Objective:     Physical Exam   Constitutional: She is oriented to person, place, and time.   HENT:   Head: Normocephalic and atraumatic.   Ears:   Right Ear: Tympanic membrane, external ear and ear canal normal.   Left Ear: Tympanic membrane, external ear and ear canal normal.   Nose: Congestion present.   Mouth/Throat: Posterior oropharyngeal " erythema present.   Eyes: Conjunctivae are normal. Pupils are equal, round, and reactive to light. Extraocular movement intact   Neck: Neck supple.   Cardiovascular: Normal rate, regular rhythm, normal heart sounds and normal pulses.   Pulmonary/Chest: Effort normal and breath sounds normal.   Abdominal: Normal appearance. She exhibits no distension. Soft. There is no abdominal tenderness. There is no guarding.   Musculoskeletal: Normal range of motion.         General: Normal range of motion.   Neurological: no focal deficit. She is alert, oriented to person, place, and time and at baseline.   Skin: Skin is warm.   Psychiatric: Her behavior is normal. Mood, judgment and thought content normal.   Nursing note and vitals reviewed.      Assessment:     1. Urinary tract infection without hematuria, site unspecified    2. Sinus congestion    3. Cough, unspecified type    4. Nasal congestion    5. Glucose found in urine on examination        Plan:       Urinary tract infection without hematuria, site unspecified  -     Urine Culture High Risk  -     POCT Glucose, Hand-Held Device    Sinus congestion  -     POCT Influenza A/B Rapid Antigen  -     SARS Coronavirus 2 Antigen, POCT Manual Read  -     POCT Urinalysis  -     Urine Culture High Risk  -     POCT Glucose, Hand-Held Device    Cough, unspecified type  -     Urine Culture High Risk  -     POCT Glucose, Hand-Held Device    Nasal congestion  -     Urine Culture High Risk  -     POCT Glucose, Hand-Held Device    Glucose found in urine on examination  -     Urine Culture High Risk  -     POCT Glucose, Hand-Held Device    Other orders  -     Discontinue: ipratropium (ATROVENT) 21 mcg (0.03 %) nasal spray; 2 sprays by Each Nostril route 2 (two) times daily.  Dispense: 30 mL; Refill: 0  -     Discontinue: promethazine-dextromethorphan (PROMETHAZINE-DM) 6.25-15 mg/5 mL Syrp; Take 5 mLs by mouth nightly as needed (cough).  Dispense: 120 mL; Refill: 0  -     Discontinue:  nitrofurantoin, macrocrystal-monohydrate, (MACROBID) 100 MG capsule; Take 1 capsule (100 mg total) by mouth 2 (two) times daily. for 7 days  Dispense: 14 capsule; Refill: 0  -     ipratropium (ATROVENT) 21 mcg (0.03 %) nasal spray; 2 sprays by Each Nostril route 2 (two) times daily.  Dispense: 30 mL; Refill: 0  -     nitrofurantoin, macrocrystal-monohydrate, (MACROBID) 100 MG capsule; Take 1 capsule (100 mg total) by mouth 2 (two) times daily. for 7 days  Dispense: 14 capsule; Refill: 0  -     promethazine-dextromethorphan (PROMETHAZINE-DM) 6.25-15 mg/5 mL Syrp; Take 5 mLs by mouth nightly as needed (cough).  Dispense: 120 mL; Refill: 0

## 2025-05-13 ENCOUNTER — TELEPHONE (OUTPATIENT)
Dept: URGENT CARE | Facility: CLINIC | Age: 85
End: 2025-05-13
Payer: MEDICARE

## 2025-05-13 RX ORDER — FLUCONAZOLE 150 MG/1
TABLET ORAL
Qty: 2 TABLET | Refills: 0 | Status: SHIPPED | OUTPATIENT
Start: 2025-05-13 | End: 2025-05-13

## 2025-05-13 RX ORDER — METRONIDAZOLE 500 MG/1
500 TABLET ORAL EVERY 12 HOURS
Qty: 14 TABLET | Refills: 0 | Status: SHIPPED | OUTPATIENT
Start: 2025-05-13 | End: 2025-05-13